# Patient Record
Sex: FEMALE | Race: WHITE | NOT HISPANIC OR LATINO | Employment: OTHER
[De-identification: names, ages, dates, MRNs, and addresses within clinical notes are randomized per-mention and may not be internally consistent; named-entity substitution may affect disease eponyms.]

---

## 2017-12-01 ENCOUNTER — RECORDS - HEALTHEAST (OUTPATIENT)
Dept: ADMINISTRATIVE | Facility: OTHER | Age: 77
End: 2017-12-01

## 2017-12-02 ENCOUNTER — RECORDS - HEALTHEAST (OUTPATIENT)
Dept: ADMINISTRATIVE | Facility: OTHER | Age: 77
End: 2017-12-02

## 2017-12-18 ENCOUNTER — AMBULATORY - HEALTHEAST (OUTPATIENT)
Dept: CARDIAC REHAB | Facility: HOSPITAL | Age: 77
End: 2017-12-18

## 2017-12-18 DIAGNOSIS — Z95.5 STENTED CORONARY ARTERY: ICD-10-CM

## 2017-12-22 ENCOUNTER — AMBULATORY - HEALTHEAST (OUTPATIENT)
Dept: CARDIAC REHAB | Facility: HOSPITAL | Age: 77
End: 2017-12-22

## 2017-12-22 DIAGNOSIS — Z95.5 STENTED CORONARY ARTERY: ICD-10-CM

## 2017-12-22 RX ORDER — ISOSORBIDE MONONITRATE 30 MG/1
30 TABLET, EXTENDED RELEASE ORAL EVERY EVENING
Status: SHIPPED | COMMUNITY
Start: 2017-12-22

## 2017-12-22 RX ORDER — PANTOPRAZOLE SODIUM 40 MG/1
40 TABLET, DELAYED RELEASE ORAL DAILY
Status: SHIPPED | COMMUNITY
Start: 2017-12-22 | End: 2023-11-22

## 2017-12-22 RX ORDER — LEVOTHYROXINE SODIUM 88 UG/1
88 TABLET ORAL EVERY MORNING
Status: SHIPPED | COMMUNITY
Start: 2017-12-22

## 2017-12-22 RX ORDER — NITROGLYCERIN 0.4 MG/1
0.4 TABLET SUBLINGUAL EVERY 5 MIN PRN
Status: SHIPPED | COMMUNITY
Start: 2017-12-22

## 2017-12-22 ASSESSMENT — MIFFLIN-ST. JEOR: SCORE: 1109.99

## 2017-12-29 ENCOUNTER — AMBULATORY - HEALTHEAST (OUTPATIENT)
Dept: CARDIAC REHAB | Facility: HOSPITAL | Age: 77
End: 2017-12-29

## 2017-12-29 DIAGNOSIS — Z95.5 STENTED CORONARY ARTERY: ICD-10-CM

## 2018-01-05 ENCOUNTER — AMBULATORY - HEALTHEAST (OUTPATIENT)
Dept: CARDIAC REHAB | Facility: HOSPITAL | Age: 78
End: 2018-01-05

## 2018-01-05 DIAGNOSIS — Z95.5 STENTED CORONARY ARTERY: ICD-10-CM

## 2018-01-08 ENCOUNTER — AMBULATORY - HEALTHEAST (OUTPATIENT)
Dept: CARDIAC REHAB | Facility: HOSPITAL | Age: 78
End: 2018-01-08

## 2018-01-08 DIAGNOSIS — Z95.5 STENTED CORONARY ARTERY: ICD-10-CM

## 2018-01-10 ENCOUNTER — AMBULATORY - HEALTHEAST (OUTPATIENT)
Dept: CARDIAC REHAB | Facility: HOSPITAL | Age: 78
End: 2018-01-10

## 2018-01-10 DIAGNOSIS — Z95.5 STENTED CORONARY ARTERY: ICD-10-CM

## 2018-01-11 ENCOUNTER — AMBULATORY - HEALTHEAST (OUTPATIENT)
Dept: ADMINISTRATIVE | Facility: REHABILITATION | Age: 78
End: 2018-01-11

## 2018-01-11 DIAGNOSIS — M54.12 CERVICAL RADICULITIS: ICD-10-CM

## 2018-01-11 DIAGNOSIS — M25.511 RIGHT SHOULDER PAIN: ICD-10-CM

## 2018-01-15 ENCOUNTER — RECORDS - HEALTHEAST (OUTPATIENT)
Dept: ADMINISTRATIVE | Facility: OTHER | Age: 78
End: 2018-01-15

## 2018-01-18 ENCOUNTER — OFFICE VISIT - HEALTHEAST (OUTPATIENT)
Dept: PHYSICAL THERAPY | Facility: REHABILITATION | Age: 78
End: 2018-01-18

## 2018-01-18 DIAGNOSIS — M62.81 GENERALIZED MUSCLE WEAKNESS: ICD-10-CM

## 2018-01-18 DIAGNOSIS — M77.8 RIGHT SHOULDER TENDONITIS: ICD-10-CM

## 2018-01-18 DIAGNOSIS — M54.2 BILATERAL POSTERIOR NECK PAIN: ICD-10-CM

## 2018-01-18 DIAGNOSIS — R29.3 POOR POSTURE: ICD-10-CM

## 2018-01-22 ENCOUNTER — AMBULATORY - HEALTHEAST (OUTPATIENT)
Dept: CARDIAC REHAB | Facility: HOSPITAL | Age: 78
End: 2018-01-22

## 2018-01-22 DIAGNOSIS — Z95.5 STENTED CORONARY ARTERY: ICD-10-CM

## 2018-01-24 ENCOUNTER — OFFICE VISIT - HEALTHEAST (OUTPATIENT)
Dept: PHYSICAL THERAPY | Facility: REHABILITATION | Age: 78
End: 2018-01-24

## 2018-01-24 DIAGNOSIS — M77.8 RIGHT SHOULDER TENDONITIS: ICD-10-CM

## 2018-01-24 DIAGNOSIS — R29.3 POOR POSTURE: ICD-10-CM

## 2018-01-24 DIAGNOSIS — M62.81 GENERALIZED MUSCLE WEAKNESS: ICD-10-CM

## 2018-01-24 DIAGNOSIS — M54.2 BILATERAL POSTERIOR NECK PAIN: ICD-10-CM

## 2018-01-26 ENCOUNTER — AMBULATORY - HEALTHEAST (OUTPATIENT)
Dept: CARDIAC REHAB | Facility: HOSPITAL | Age: 78
End: 2018-01-26

## 2018-01-26 DIAGNOSIS — Z95.5 STENTED CORONARY ARTERY: ICD-10-CM

## 2018-01-29 ENCOUNTER — AMBULATORY - HEALTHEAST (OUTPATIENT)
Dept: CARDIAC REHAB | Facility: HOSPITAL | Age: 78
End: 2018-01-29

## 2018-01-29 DIAGNOSIS — Z95.5 STENTED CORONARY ARTERY: ICD-10-CM

## 2018-02-02 ENCOUNTER — AMBULATORY - HEALTHEAST (OUTPATIENT)
Dept: CARDIAC REHAB | Facility: HOSPITAL | Age: 78
End: 2018-02-02

## 2018-02-02 DIAGNOSIS — Z95.5 STENTED CORONARY ARTERY: ICD-10-CM

## 2018-02-05 ENCOUNTER — AMBULATORY - HEALTHEAST (OUTPATIENT)
Dept: CARDIAC REHAB | Facility: HOSPITAL | Age: 78
End: 2018-02-05

## 2018-02-05 DIAGNOSIS — Z95.5 STENTED CORONARY ARTERY: ICD-10-CM

## 2018-02-07 ENCOUNTER — OFFICE VISIT - HEALTHEAST (OUTPATIENT)
Dept: PHYSICAL THERAPY | Facility: REHABILITATION | Age: 78
End: 2018-02-07

## 2018-02-07 DIAGNOSIS — M77.8 RIGHT SHOULDER TENDONITIS: ICD-10-CM

## 2018-02-07 DIAGNOSIS — R29.3 POOR POSTURE: ICD-10-CM

## 2018-02-07 DIAGNOSIS — M54.2 BILATERAL POSTERIOR NECK PAIN: ICD-10-CM

## 2018-02-07 DIAGNOSIS — M62.81 GENERALIZED MUSCLE WEAKNESS: ICD-10-CM

## 2018-02-12 ENCOUNTER — AMBULATORY - HEALTHEAST (OUTPATIENT)
Dept: CARDIAC REHAB | Facility: HOSPITAL | Age: 78
End: 2018-02-12

## 2018-02-12 DIAGNOSIS — Z95.5 STENTED CORONARY ARTERY: ICD-10-CM

## 2018-02-16 ENCOUNTER — AMBULATORY - HEALTHEAST (OUTPATIENT)
Dept: CARDIAC REHAB | Facility: HOSPITAL | Age: 78
End: 2018-02-16

## 2018-02-16 DIAGNOSIS — Z95.5 STENTED CORONARY ARTERY: ICD-10-CM

## 2018-02-19 ENCOUNTER — OFFICE VISIT - HEALTHEAST (OUTPATIENT)
Dept: PHYSICAL THERAPY | Facility: REHABILITATION | Age: 78
End: 2018-02-19

## 2018-02-19 ENCOUNTER — AMBULATORY - HEALTHEAST (OUTPATIENT)
Dept: CARDIAC REHAB | Facility: HOSPITAL | Age: 78
End: 2018-02-19

## 2018-02-19 DIAGNOSIS — M54.2 BILATERAL POSTERIOR NECK PAIN: ICD-10-CM

## 2018-02-19 DIAGNOSIS — R29.3 POOR POSTURE: ICD-10-CM

## 2018-02-19 DIAGNOSIS — Z95.5 STENTED CORONARY ARTERY: ICD-10-CM

## 2018-02-19 DIAGNOSIS — M62.81 GENERALIZED MUSCLE WEAKNESS: ICD-10-CM

## 2018-02-19 DIAGNOSIS — M77.8 RIGHT SHOULDER TENDONITIS: ICD-10-CM

## 2018-02-23 ENCOUNTER — AMBULATORY - HEALTHEAST (OUTPATIENT)
Dept: CARDIAC REHAB | Facility: HOSPITAL | Age: 78
End: 2018-02-23

## 2018-02-23 DIAGNOSIS — Z95.5 STENTED CORONARY ARTERY: ICD-10-CM

## 2018-02-26 ENCOUNTER — OFFICE VISIT - HEALTHEAST (OUTPATIENT)
Dept: PHYSICAL THERAPY | Facility: REHABILITATION | Age: 78
End: 2018-02-26

## 2018-02-26 ENCOUNTER — AMBULATORY - HEALTHEAST (OUTPATIENT)
Dept: CARDIAC REHAB | Facility: HOSPITAL | Age: 78
End: 2018-02-26

## 2018-02-26 DIAGNOSIS — R29.3 POOR POSTURE: ICD-10-CM

## 2018-02-26 DIAGNOSIS — Z95.5 STENTED CORONARY ARTERY: ICD-10-CM

## 2018-02-26 DIAGNOSIS — M77.8 RIGHT SHOULDER TENDONITIS: ICD-10-CM

## 2018-02-26 DIAGNOSIS — M62.81 GENERALIZED MUSCLE WEAKNESS: ICD-10-CM

## 2018-02-26 DIAGNOSIS — M54.2 BILATERAL POSTERIOR NECK PAIN: ICD-10-CM

## 2018-03-02 ENCOUNTER — AMBULATORY - HEALTHEAST (OUTPATIENT)
Dept: CARDIAC REHAB | Facility: HOSPITAL | Age: 78
End: 2018-03-02

## 2018-03-02 DIAGNOSIS — Z95.5 STENTED CORONARY ARTERY: ICD-10-CM

## 2018-03-05 ENCOUNTER — OFFICE VISIT - HEALTHEAST (OUTPATIENT)
Dept: PHYSICAL THERAPY | Facility: REHABILITATION | Age: 78
End: 2018-03-05

## 2018-03-05 DIAGNOSIS — R29.3 POOR POSTURE: ICD-10-CM

## 2018-03-05 DIAGNOSIS — M54.2 BILATERAL POSTERIOR NECK PAIN: ICD-10-CM

## 2018-03-05 DIAGNOSIS — M77.8 RIGHT SHOULDER TENDONITIS: ICD-10-CM

## 2018-03-05 DIAGNOSIS — M62.81 GENERALIZED MUSCLE WEAKNESS: ICD-10-CM

## 2018-03-12 ENCOUNTER — OFFICE VISIT - HEALTHEAST (OUTPATIENT)
Dept: PHYSICAL THERAPY | Facility: REHABILITATION | Age: 78
End: 2018-03-12

## 2018-03-12 DIAGNOSIS — M77.8 RIGHT SHOULDER TENDONITIS: ICD-10-CM

## 2018-03-12 DIAGNOSIS — R29.3 POOR POSTURE: ICD-10-CM

## 2018-03-12 DIAGNOSIS — M62.81 GENERALIZED MUSCLE WEAKNESS: ICD-10-CM

## 2018-03-12 DIAGNOSIS — M54.2 BILATERAL POSTERIOR NECK PAIN: ICD-10-CM

## 2018-03-19 ENCOUNTER — OFFICE VISIT - HEALTHEAST (OUTPATIENT)
Dept: PHYSICAL THERAPY | Facility: REHABILITATION | Age: 78
End: 2018-03-19

## 2018-03-19 DIAGNOSIS — R29.3 POOR POSTURE: ICD-10-CM

## 2018-03-19 DIAGNOSIS — M62.81 GENERALIZED MUSCLE WEAKNESS: ICD-10-CM

## 2018-03-19 DIAGNOSIS — M77.8 RIGHT SHOULDER TENDONITIS: ICD-10-CM

## 2018-03-19 DIAGNOSIS — M54.2 BILATERAL POSTERIOR NECK PAIN: ICD-10-CM

## 2018-03-26 ENCOUNTER — OFFICE VISIT - HEALTHEAST (OUTPATIENT)
Dept: PHYSICAL THERAPY | Facility: REHABILITATION | Age: 78
End: 2018-03-26

## 2018-03-26 DIAGNOSIS — M77.8 RIGHT SHOULDER TENDONITIS: ICD-10-CM

## 2018-03-26 DIAGNOSIS — R29.3 POOR POSTURE: ICD-10-CM

## 2018-03-26 DIAGNOSIS — M62.81 GENERALIZED MUSCLE WEAKNESS: ICD-10-CM

## 2018-03-26 DIAGNOSIS — M54.2 BILATERAL POSTERIOR NECK PAIN: ICD-10-CM

## 2018-04-03 ENCOUNTER — OFFICE VISIT - HEALTHEAST (OUTPATIENT)
Dept: PHYSICAL THERAPY | Facility: REHABILITATION | Age: 78
End: 2018-04-03

## 2018-04-03 DIAGNOSIS — M62.81 GENERALIZED MUSCLE WEAKNESS: ICD-10-CM

## 2018-04-03 DIAGNOSIS — M77.8 RIGHT SHOULDER TENDONITIS: ICD-10-CM

## 2018-04-03 DIAGNOSIS — R29.3 POOR POSTURE: ICD-10-CM

## 2018-04-03 DIAGNOSIS — M54.2 BILATERAL POSTERIOR NECK PAIN: ICD-10-CM

## 2018-04-10 ENCOUNTER — OFFICE VISIT - HEALTHEAST (OUTPATIENT)
Dept: PHYSICAL THERAPY | Facility: REHABILITATION | Age: 78
End: 2018-04-10

## 2018-04-10 DIAGNOSIS — M54.2 BILATERAL POSTERIOR NECK PAIN: ICD-10-CM

## 2018-04-10 DIAGNOSIS — R29.3 POOR POSTURE: ICD-10-CM

## 2018-04-10 DIAGNOSIS — M62.81 GENERALIZED MUSCLE WEAKNESS: ICD-10-CM

## 2018-04-10 DIAGNOSIS — M77.8 RIGHT SHOULDER TENDONITIS: ICD-10-CM

## 2018-05-08 ENCOUNTER — OFFICE VISIT - HEALTHEAST (OUTPATIENT)
Dept: PHYSICAL THERAPY | Facility: REHABILITATION | Age: 78
End: 2018-05-08

## 2018-05-08 DIAGNOSIS — M54.2 BILATERAL POSTERIOR NECK PAIN: ICD-10-CM

## 2018-05-08 DIAGNOSIS — M77.8 RIGHT SHOULDER TENDONITIS: ICD-10-CM

## 2018-05-08 DIAGNOSIS — R29.3 POOR POSTURE: ICD-10-CM

## 2018-05-08 DIAGNOSIS — M62.81 GENERALIZED MUSCLE WEAKNESS: ICD-10-CM

## 2020-06-01 ENCOUNTER — ANESTHESIA - HEALTHEAST (OUTPATIENT)
Dept: SURGERY | Facility: HOSPITAL | Age: 80
End: 2020-06-01

## 2020-06-01 ASSESSMENT — MIFFLIN-ST. JEOR: SCORE: 1100.92

## 2020-06-02 ENCOUNTER — HOME CARE/HOSPICE - HEALTHEAST (OUTPATIENT)
Dept: HOME HEALTH SERVICES | Facility: HOME HEALTH | Age: 80
End: 2020-06-02

## 2020-06-02 ENCOUNTER — SURGERY - HEALTHEAST (OUTPATIENT)
Dept: SURGERY | Facility: HOSPITAL | Age: 80
End: 2020-06-02

## 2020-06-03 ENCOUNTER — AMBULATORY - HEALTHEAST (OUTPATIENT)
Dept: OTHER | Facility: CLINIC | Age: 80
End: 2020-06-03

## 2021-02-03 ENCOUNTER — COMMUNICATION - HEALTHEAST (OUTPATIENT)
Dept: PHYSICAL THERAPY | Facility: REHABILITATION | Age: 81
End: 2021-02-03

## 2021-02-15 ENCOUNTER — AMBULATORY - HEALTHEAST (OUTPATIENT)
Dept: NURSING | Facility: CLINIC | Age: 81
End: 2021-02-15

## 2021-03-08 ENCOUNTER — AMBULATORY - HEALTHEAST (OUTPATIENT)
Dept: NURSING | Facility: CLINIC | Age: 81
End: 2021-03-08

## 2021-05-31 VITALS — BODY MASS INDEX: 23.63 KG/M2 | WEIGHT: 142 LBS

## 2021-05-31 VITALS — WEIGHT: 141 LBS | BODY MASS INDEX: 23.46 KG/M2

## 2021-05-31 VITALS — HEIGHT: 65 IN | WEIGHT: 142 LBS | BODY MASS INDEX: 23.66 KG/M2

## 2021-05-31 VITALS — BODY MASS INDEX: 23.96 KG/M2 | WEIGHT: 144 LBS

## 2021-05-31 VITALS — BODY MASS INDEX: 23.46 KG/M2 | WEIGHT: 141 LBS

## 2021-05-31 VITALS — BODY MASS INDEX: 23.26 KG/M2 | WEIGHT: 139.8 LBS

## 2021-05-31 VITALS — BODY MASS INDEX: 23.3 KG/M2 | WEIGHT: 140 LBS

## 2021-05-31 VITALS — WEIGHT: 140.5 LBS | BODY MASS INDEX: 23.38 KG/M2

## 2021-05-31 VITALS — BODY MASS INDEX: 23.8 KG/M2 | WEIGHT: 143 LBS

## 2021-06-04 VITALS — WEIGHT: 144.8 LBS | BODY MASS INDEX: 24.12 KG/M2 | HEIGHT: 65 IN

## 2021-06-06 ENCOUNTER — HEALTH MAINTENANCE LETTER (OUTPATIENT)
Age: 81
End: 2021-06-06

## 2021-06-08 NOTE — ANESTHESIA CARE TRANSFER NOTE
Last vitals:   Vitals:    06/02/20 0734   BP:    Pulse: 78   Resp:    Temp:    SpO2: 93%     Patient's level of consciousness is drowsy  Spontaneous respirations: yes  Maintains airway independently: yes  Dentition unchanged: yes  Oropharynx: oropharynx clear of all foreign objects    QCDR Measures:  ASA# 20 - Surgical Safety Checklist: WHO surgical safety checklist completed prior to induction    PQRS# 430 - Adult PONV Prevention: 4558F - Pt received => 2 anti-emetic agents (different classes) preop & intraop  ASA# 8 - Peds PONV Prevention: NA - Not pediatric patient, not GA or 2 or more risk factors NOT present  PQRS# 424 - Christy-op Temp Management: 4559F - At least one body temp DOCUMENTED => 35.5C or 95.9F within required timeframe  PQRS# 426 - PACU Transfer Protocol: - Transfer of care checklist used  ASA# 14 - Acute Post-op Pain: ASA14B - Patient did NOT experience pain >= 7 out of 10

## 2021-06-08 NOTE — ANESTHESIA POSTPROCEDURE EVALUATION
Patient: Jessica Cruz  Procedure(s):  HEMIARTHROPLASTY, HIP, BIPOLAR LEFT (Left)  Anesthesia type: spinal    Patient location: PACU  Last vitals:   Vitals Value Taken Time   /80 6/2/2020 11:50 AM   Temp 37.6  C (99.6  F) 6/2/2020 10:15 AM   Pulse 81 6/2/2020 11:55 AM   Resp 20 6/2/2020 11:55 AM   SpO2 97 % 6/2/2020 11:55 AM   Vitals shown include unvalidated device data.  Post vital signs: stable  Level of consciousness: alert and conversant  Post-anesthesia pain: pain controlled  Post-anesthesia nausea and vomiting: no  Pulmonary: supplemental oxygen  Cardiovascular: stable and blood pressure at baseline  Hydration: adequate  Anesthetic events: no    QCDR Measures:  ASA# 11 - Christy-op Cardiac Arrest: ASA11B - Patient did NOT experience unanticipated cardiac arrest  ASA# 12 - Christy-op Mortality Rate: ASA12B - Patient did NOT die  ASA# 13 - PACU Re-Intubation Rate: ASA13B - Patient did NOT require a new airway mgmt  ASA# 10 - Composite Anes Safety: ASA10A - No serious adverse event    Additional Notes:

## 2021-06-08 NOTE — PROGRESS NOTES
Patient was seen at [facility] room 229 for the F/D of a [place] PLS AFO, left, size medium. Dx: left foot drop. AFO was adjusted for proper fit. I was able to don AFO with shoe provided.  Patient was tired after fitting and wishes to try the AFO with PT tomorrow.    Please contact [facility] with any question or fitting issues.

## 2021-06-08 NOTE — ANESTHESIA PROCEDURE NOTES
Spinal Block    Patient location during procedure: OR  Start time: 6/2/2020 8:25 AM  End time: 6/2/2020 8:40 AM  Reason for block: primary anesthetic    Staffing:  Performing  Anesthesiologist: Leoncio Guzman MD    Preanesthetic Checklist  Completed: patient identified, risks, benefits, and alternatives discussed, timeout performed, consent obtained, airway assessed, oxygen available, suction available, emergency drugs available and hand hygiene performed  Spinal Block  Patient position: right lateral decubitus  Prep: ChloraPrep  Patient monitoring: heart rate, cardiac monitor, continuous pulse ox and blood pressure  Approach: midline  Interspace: multiple tries at multiple levels, midline and paramwedian.  Needle type: Spinocan.   Needle gauge: 22 G      Additional Notes:  Got good pops with multiple passes, but never obtained CSF.  Elected GETA.

## 2021-06-08 NOTE — ANESTHESIA PREPROCEDURE EVALUATION
"Anesthesia Evaluation      Patient summary reviewed   History of anesthetic complications     Airway   Mallampati: II   Pulmonary - normal exam   (+) a smoker (Former)                         Cardiovascular - normal exam  Exercise tolerance: > or = 4 METS  (+) hypertension, valvular problems/murmurs (Moderate AR) AI, past MI, CAD, CABG/stent (Stents x 3), , hypercholesterolemia,     (-) angina  ECG reviewed        Neuro/Psych    (+) neuromuscular disease,      Endo/Other    (+) hypothyroidism, arthritis,      Comments: Left hip fracture    GI/Hepatic/Renal    (+) GERD well controlled,       Comments: H/o colon Ca     Other findings: L cervical radiculopathy, has sciatica as well. PONV.  Coronary stents x 3.  Diverticular disease, gastritis, colon Ca.    Pt has supplemental oxygen on, says O2 was \"low\" last night.      4/3/19 Echo:  The left ventricle is normal size,  left ventricular systolic function is normal, estimated LVEF 55-60%.  Left ventricular wall motion is grossly normal however, endocardial definition is limited.  There is moderate aortic regurgitation.  Normal right ventricular systolic function.  No pulmonary hypertension, estimated right ventricular systolic pressure is  20 mmHg.  Compared to prior study dated 10/03/2018 TTE there is no significant  change in the LVEF. The AI is moderate in both studies.    COVID PCR negative 6/1/20      Dental    (+) upper dentures                       Anesthesia Plan  Planned anesthetic: spinal    ASA 3     Anesthetic plan and risks discussed with: patient  Anesthesia plan special considerations: antiemetics,   Post-op plan: routine recovery          "

## 2021-06-14 NOTE — PROGRESS NOTES
ITP ASSESSMENT   Assessment Day: Initial  Session Number: 1  Precautions: Standard  Diagnosis: Stent  Risk Stratification: Medium  Referring Provider: Consuelo Yates MD  EXERCISE  Exercise Assessment: Initial     6 Minute Walk Test   Pre   Pre Exercise HR: 66                  Pre Exercise BP: 117/64    Peak  Peak HR: 83                 Peak BP: 143/60  Peak feet: 1200  Peak O2 SAT: 99  Peak RPE: 13  Peak MPH: 2.27    Symptoms:  Peak Symptoms: Minimal SOB    5 mins. Post  5 Min Post HR: 59  5 Min Post BP: 132/53                         Exercise Plan  Goals Next 30 days  ADL'S: COok meals and do laundry without SOB  Leisure: Walk at the [facility] 3-5 x week  Work: Resume volunteering at [employer]    Education Goals: Patient can state cardiac s/s and appropriate emergency response.;Has system for taking medication.;Medication review  Education Goals Met: Has system for taking medication.    Exercise Prescription  Exercise Mode: Treadmill;Bike;Nustep;Arm Erg.  Frequency: 2 x week  Duration: 30-40'  Intensity / THR: 20-30 beats above resting heart rate  RPE 11-14  Progression / Met level: 2.6-3  Resistive Training?: No (Will complete in the future)    Current Exercise (mins/week): 1    Interventions  Home Exercise:  Mode: Walk  Frequency: 3-5 x week  Duration: 10-20'    Education Material : Educational videos;Provide written material;Individual education and counseling;Offer educational classes    Education Completed  Exercise Education Completed: Cardiac Anatomy;Signs and Symptoms;RPE;Emergency Plan;Home Exercise;Warm up/cool down;FITT Principles;BP/HR Reponse to exercise;Benefits of Exercise;End point of exercise            Exercise Follow-up/Discharge  Follow up/Discharge: Encouraged indoor walking;  3- 5 x week NUTRITION  Nutrition Assessment: Initial    Nutrition Risk Factors:  Nutrition Risk Factors: Dyslipidemia  Cholesterol: 207  LDL: 67  HDL: 120  Triglycerides: 98    Nutrition  "Plan  Interventions  Nutrition Interventions: Diet consult;Diet class;Therapist/Patient discussion;Educational videos;Provide with written material    Education Completed  Nutrition Education Completed: Risk factor overview    Goals  Nutrition Goals (Next 30 days): Review Dietitian schedule;Provide Rate your Plate Survey    Goals Met  Nutrition Goals Met: Reviewed Dietitian schedule;Provided Rate your Plate Survey    Height, Weight, and  BMI  Weight: 142 lb (64.4 kg)  Height: 5' 5\" (1.651 m)  BMI: 23.63    Nutrition Follow-up  Follow-up/Discharge: Encouraged pt to complete diet habit survey       Other Risk Factors  Other Risk Factor Assessment: Initial    HTN Risk Factor: NA    Pre Exercise BP: 117/64  Post Exercise BP: 132/53        Tobacco Risk Factor: NA            Risk Factor Follow-up   Follow-up/Discharge: Provide risk factor education as needed   PSYCHOSOCIAL  Psychosocial Assessment: Initial     [facility] ISSA Q of L Summary Score: 29    QING-D Score: 12    Psychosocial Risk Factor: Stress    Psychosocial Plan  Interventions    Interventions: Offer Social Service consult;Offer Spiritual Care consult;Offer educational videos and classes;Provide written material;Individual education and counseling    Education Completed  Education Completed: Effects of stress on body    Goals  Goals (Next 30 days): Practicing stress management skills    Goals Met  Goals Met: Identified Support system;Oriented to stress management classes;Identify stressors    Psychosocial Follow-up  Follow-up/Discharge: Issued pt Stress/ Relaxation booklet;  Reports she has a great deal of stress           Patient involved in Goal setting?: Yes    Signature: _____________________________________________________________    Date: __________________    Time: __________________  "

## 2021-06-14 NOTE — PROGRESS NOTES
"Cardiac Rehab  Phase II Assessment    Assessment Date: 12/22/17    Diagnosis: PCI/stent  Date of Onset: 12/1/17  Procedure: PCI/Stent  RCA  Date of Onset: 121/1/17  ICD/Pacemaker: No   Post-op Complications: Re-Admit to [facility] 12/9-12/10/17 with CP.  MI ruled out.  Stared on Imdur  ECG History: SB/SR/BBB EF%:50%  Past Medical History:Unstable Angina; Aortic Regurg; Dyslipidemia; Hypothyroidism; Corneal abrasion; Cellulitis; Colonic Polyps                                     Arhtritis; Bleeding Disorder; Colon CA; CAD; Dry EYe; Insomnia;    Physical Assessment  Precautions/ Physical Limitations: Standard  Oxygen: No  O2 Sats: 97-99% Lung Sounds: Clear Edema:   Incisions:   Sleeping Pattern: poor  Appetite: fair   Nutrition Risk Screen: Weight loss    Pain  Location: Shoulder and Low back pain  Characteristics:Achey  Intensity: (0-10 scale) 2  Current Pain Management: Tylenol  Intervention:   Response:     Psychosocial/ Emotional Health  1. In the past 12 months, have you been in a relationship where you have been abused physically, emotionally, sexually or financially? No  notified: NA  2. Who do you turn to for emotional support?:Friend and Sister-in-law  3. Do you have cultural or spiritual needs? No  4. Have there been any major life changes in the past 12 months? Yes ;Family stress;  Grand dtr very ill;  Pt used to take care of 3 days week;  Unable to now;Caused stress in family    Referral Information  Primary Physician: Consuelo Yates MD  Cardiologist: Dr. Peyman Krishnamurthy  Surgeon:     Home exercise/Equipment: None;  Belongs to [facility]    Patient's long-term goal(s): Resume all previous tasks    1. Living Accommodations: [facility] Steps: No      Support people at home:    2. Marital Status:   3. Family is able to assist with cares      Temple/Community involvement: Yes  4. Recreation/Hobbies: Belongs to 3 \"[employer]\"  Travel;  Local tours         See Doc " Flowsheet

## 2021-06-15 NOTE — PROGRESS NOTES
"[facility] Daily Progress     Patient Name: Jessica Cruz  Date: 1/24/2018  Visit #:2/8 - [facility]  PTA visit #:    Referral Diagnosis: Neck pain, shoulder pain   Referring provider: Consuelo Yates MD  Visit Diagnosis:     ICD-10-CM    1. Bilateral posterior neck pain M54.2    2. Right shoulder tendonitis M75.81    3. Generalized muscle weakness M62.81    4. Poor posture R29.3          Assessment:   Patient returns to PT with increased neck pain and L UE radiation. She wishes to pursue a cortiosteroid injection, which likely is appropriate given she was unable to tolerate gentle exercise today and her Sx are worsening. She has been non compliant with HEP thus far. Gave patient information for [facility] spine center and suggested she contact PCP regarding worsening of cervical symptoms.     Patient is benefitting from skilled physical therapy and is making steady progress toward functional goals.  Patient is appropriate to continue with skilled physical therapy intervention, as indicated by initial plan of care.    Goal Status:  Pt. will be independent with home exercise program in : 2 weeks  Pt will: return to yoga 1x/week for 60' without increased shoulder or neck pain to improve exercise in 8 weeks  Pt will: sleep without waking due to neck pain >4/7 nights/week to improve sleep quality in 8 weeks  Pt will: reduce NDI by >10% for significant change in 8 weeks  Pt will: sit >45 minutes to drive or read without increased neck pain to improve ADL's and safety in 8 weeks    Plan / Patient Education:     Continue with initial plan of care.  Discussed pt can contact PCP regarding potential corticosteroid injection.     Subjective:   Pt returns to PT stating her pain has worsened. She c/o intense 8/10 neck pain with L UE radiation. She has not been performing HEP due to her pain. \"It's a crazy bone pain in my left arm\". Pt also c/o feeling her arm is \"hot\".       Objective:     Decreased pain 1/10 during manual " "cervical traction     Treatment Today     TREATMENT MINUTES COMMENTS   Evaluation     Self-care/ Home management     Manual therapy 40 Discussion of progress - pt requests to receive a cortiosteroid injection in cervical spine.   Educated pt on getting order from PCP and potential referral to HE spine center.     Grade III cervical traction x 30 second holds    SO release with gentle traction x 20 seconds holds    STM to B SCM and scalenes with gentle SB setretch    Neuromuscular Re-education     Therapeutic Activity     Therapeutic Exercises 15 Exercises:  Exercise #1: Chin tuck and chin tuck with head pressure  Comment #1: not today- pt c/o increased neck pain and L UE radiation   Exercise #2: Pec stretch  Comment #2: not today- pt c/o increased B shoulder pain. Attempted with arms straight and \"w\" rather than goal post arms, but pt was still unable to tolerate.   Exercise #3: Shoulder extension with L2 band x 3 seconds x 10 reps  Comment #3: HEP - pt tolerated 10 reps, but c/o increased L shoulder pain after 10 reps     Gait training     Modality__________________                Total 55    Blank areas are intentional and mean the treatment did not include these items.       Stacie Mena  1/24/2018    "

## 2021-06-15 NOTE — PROGRESS NOTES
"[facility] Daily Progress     Patient Name: Jessica Cruz  Date: 2/7/2018  Visit #:3/8 - [facility]  PTA visit #:    Referral Diagnosis: Neck pain, shoulder pain   Referring provider: Consuelo Yates MD  Visit Diagnosis:     ICD-10-CM    1. Bilateral posterior neck pain M54.2    2. Right shoulder tendonitis M75.81    3. Generalized muscle weakness M62.81    4. Poor posture R29.3          Assessment:   Patient returns to PT reporting significant improvement in neck pain and L UE radiation. She states her neck is \"sore\" but no longer has c/o UE pain. Pt appears to have responded well to manual interventions at last visit. She does however c/o increased R shoulder pain. HEP was modified again today to include shoulder and scapular strengthening, although pt was unable to tolerate all exercises due to pain. She is appropriate for continued PT 1x/week.     Patient is benefitting from skilled physical therapy and is making steady progress toward functional goals.  Patient is appropriate to continue with skilled physical therapy intervention, as indicated by initial plan of care.    Goal Status:  Pt. will be independent with home exercise program in : 2 weeks  Pt will: return to yoga 1x/week for 60' without increased shoulder or neck pain to improve exercise in 8 weeks  Pt will: sleep without waking due to neck pain >4/7 nights/week to improve sleep quality in 8 weeks  Pt will: reduce NDI by >10% for significant change in 8 weeks  Pt will: sit >45 minutes to drive or read without increased neck pain to improve ADL's and safety in 8 weeks    Plan / Patient Education:     Continue with initial plan of care.  Cont PT1x/week.     Subjective:   Pt reports she had an MRI taken- per her report, it showed stenosis.   Pt states she will be having an EMG for both arms. She is no longer interested in cervical corticosteroid injection, but would like a R shoulder injection.   Pt states her neck is occasionally sore, but the " "\"excruciating pain\" has gone away. She felt significant relief from her last treatment and is happy with how she feels today with regards to neck pain.    Objective:     Decreased pain 1/10 during manual cervical traction     Treatment Today     TREATMENT MINUTES COMMENTS   Evaluation     Self-care/ Home management     Manual therapy 25   Grade III cervical traction x 30 second holds    SO release with gentle traction x 20 seconds holds    Grade II posterior and inferior glide R GH joint with sustained hold and gentle oscillations.      Neuromuscular Re-education     Therapeutic Activity     Therapeutic Exercises 30 HEP modified as follows:     Exercise #1: Chin tuck   Comment #1: HEP x 10 seconds x 15-20 reps   Exercise #2: Reach and roll  Comment #2: HEP x 5 reps, R UE only; pt does not tolerate lying on right shoulder today  Exercise #3: Standing row with L1 band- pt unable to perform standing shoulder extension   Comment #3: HEP x 3 seconds x 10-15 reps  Exercise #4: Standing ER L1 band   Comment #4: HEP x 15-20 reps     Pt is unable to tolerate standing IR due to pain. Unable to perform reach and roll in full ROM, so modified.   Modified standing extension to standing rows with L1 band due to c/o R shoulder pain.     Gait training     Modality__________________                Total 55    Blank areas are intentional and mean the treatment did not include these items.       Stacie Mena  2/7/2018  "

## 2021-06-15 NOTE — PROGRESS NOTES
ITP ASSESSMENT   Assessment Day: 30 Day  Session Number: 5  Precautions: Standard  Diagnosis: Stent  Risk Stratification: Medium  Referring Provider: Dr. Jose Gaming  EXERCISE  Exercise Assessment: Reassessment     Tolerating 40' of exercise at 3 METs                         Exercise Plan  Goals Next 30 days  ADL'S: Walk 2-3x/week at [facility]  Leisure: Volunteer at [employer]  Work: Drive independently to visit grandchildren    Education Goals: All goals in this section met  Education Goals Met: Patient can state cardiac s/s and appropriate emergency response.;Has system for taking medication.;Medication review.                        Goals Met  Initial ADL's goals met: Pt is cooking meals - goal met  Initial Leisure goals met: Pt is tolerating laundry without SOB - goal met  Intial Work goals met: Pt has not yet resumed volunteering at [employer], but has resumed [place] - goal in progress  Initial Progression: Pt states an improvement in tolerance for activities    Exercise Prescription  Exercise Mode: Treadmill;Bike;Nustep;Arm Erg.  Frequency: 2x/week  Duration: 40'  Intensity / THR: 20-30 beats above resting heart rate  RPE 11-14  Progression / Met level: 3-3.5  Resistive Training?: Yes    Current Exercise (mins/week): 100    Interventions  Home Exercise:  Mode: Walk  Frequency: 3-5x/week  Duration: 20-30'    Education Material : Educational videos;Provide written material;Individual education and counseling;Offer educational classes    Education Completed  Exercise Education Completed: Cardiac Anatomy;Signs and Symptoms;Medication review;RPE;Emergency Plan;Home Exercise;Warm up/cool down;FITT Principles;BP/HR Reponse to exercise;Benefits of Exercise;End point of exercise            Exercise Follow-up/Discharge  Follow up/Discharge: Pt has returned to Yoga at the [facility]. Encouraged regular aerobic exercise in addition to yoga classes NUTRITION  Nutrition Assessment: Reassessment    Nutrition Risk  "Factors:  Nutrition Risk Factors: Dyslipidemia  Cholesterol: 207  LDL: 67  HDL: 120  Triglycerides: 98    Nutrition Plan  Interventions  Nutrition Interventions: Diet consult;Provide with written material  Rate Your Plate Score: 50    Education Completed  Nutrition Education Completed: Low Saturated fat diet    Goals  Nutrition Goals (Next 30 days): Patient will follow a low saturated fat diet    Goals Met  Nutrition Goals Met: Patient states following a low saturated fat diet    Height, Weight, and  BMI  Weight: 141 lb (64 kg)  Height: 5' 5\" (1.651 m)  BMI: 23.46    Nutrition Follow-up  Follow-up/Discharge: Patient stated that she needs to work on eating 3 meals per day.  She stated she frequently skips lunch because she is busy sewing.       Other Risk Factors  Other Risk Factor Assessment: Reassessment    HTN Risk Factor: NA    Pre Exercise BP: 118/54  Post Exercise BP: 114/66    Tobacco Risk Factor: NA    Risk Factor Follow-up   Follow-up/Discharge: Reviewed all risk factors and plan for risk mgmt       PSYCHOSOCIAL  Psychosocial Assessment: Reassessment     [facility] L Summary Score: 29    QING-D Score: 12    Psychosocial Risk Factor: Stress    Psychosocial Plan  Interventions  Interventions: Offer Spiritual Care consult;Offer educational videos and classes;Provide written material;Individual education and counseling    Education Completed  Education Completed: Relaxation/Coping Techniques;Effects of stress on body    Goals  Goals (Next 30 days): Practicing stress management skills    Goals Met  Goals Met: Identified Support system;Oriented to stress management classes;Identify stressors    Psychosocial Follow-up  Follow-up/Discharge: Reviewed effects of stress on the heart. Encouraged pt to attend stress mgmt class     Patient involved in Goal setting?: Yes    Signature: _____________________________________________________________    Date: __________________    Time: __________________  "

## 2021-06-15 NOTE — PROGRESS NOTES
Jessica Cruz has participated in 5 sessions of Phase II Cardiac Rehab.    Progress Report:   Cardiac Rehab Treatment Progress Report 12/22/2017 12/29/2017 1/5/2018 1/8/2018 1/10/2018   Weight 142 lbs 143 lbs 141 lbs 144 lbs 141 lbs   Pre Exercise  HR 66 73 67 64 68   Pre Exercise /64 102/54 124/62 128/78 118/54   Treadmill Peak HR - 88 81 80 78   Nustep Peak Heart Rate - 82 77 75 74   Nustep Peak Blood Pressure - 152/68 126/64 148/64 128/52   Heart Rate 59 69 69 66 64   Post Exercise /53 102/52 132/60 122/54 114/66   ECG NSR/BBB SR SR SR SR   Total Exercise Minutes 6 35 40 40 40         Current Status:  Currently exercising without complaints or symptoms.    If Physician recommends change in treatment plan, please place orders.        __________________________________________________      _____________  Signature                                                                                                  Date

## 2021-06-16 NOTE — PROGRESS NOTES
ITP ASSESSMENT   Assessment Day: 90 Day - Discharge Note  Session Number: 16  Precautions: Standard  Diagnosis: Stent  Risk Stratification: Medium  Referring Provider: Consuelo Yates MD  EXERCISE  Exercise Assessment: Reassessment     6 Minute Walk Test   Pre   Pre Exercise HR: 72                  Pre Exercise BP: 120/60    Peak  Peak HR: 96                 Peak BP: 160/70  Peak feet: 1450  Peak O2 SAT: 99  Peak RPE: 12  Peak MPH: 2.75    Symptoms:  Peak Symptoms: None    5 mins. Post  5 Min Post HR: 76  5 Min Post BP: 114/64                         Exercise Plan  Education Goals Met: Patient can state cardiac s/s and appropriate emergency response.;Has system for taking medication.;Medication review.                        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 [employer]  30 day Work goals met: Has not driven to see Grandchildren   30 Day Progression: Making progress;  Limited by shoulder discomfort    Initial ADL's goals met: Pt is cooking meals - goal met  Initial Leisure goals met: Pt is tolerating laundry without SOB - goal met  Intial Work goals met: Pt has not yet resumed volunteering at [employer], but has resumed [employer] - goal in progress  Initial Progression: Pt states an improvement in tolerance for activities    Current Exercise (mins/week): 200    Interventions  Home Exercise:  Mode: Walk  Frequency: 4-7 days/week  Duration: 40'    Education Material : Educational videos;Provide written material;Individual education and counseling;Offer educational classes    Education Completed  Exercise Education Completed: Cardiac  "Anatomy;Signs and Symptoms;Medication review;RPE;Emergency Plan;Home Exercise;Warm up/cool down;FITT Principles;BP/HR Reponse to exercise;Benefits of Exercise;End point of exercise            Exercise Follow-up/Discharge  Follow up/Discharge: Reviewed and issued home program NUTRITION  Nutrition Assessment: Reassessment    Nutrition Risk Factors:  Nutrition Risk Factors: Dyslipidemia    Nutrition Plan  Interventions  Diet Consult: Completed  Other Nutrition Intervention: Diet Class;Therapist/Pt Discussion;Educational Videos;Provide with Written Material  Initial Rate Your Plate Score: 50    Education Completed  Nutrition Education Completed: Low Saturated fat diet;Risk factor overview;Low sodium diet;Weight management    Goals Met  Nutrition Goals Met: Patient can identify their risk factors for CAD;Patient follows a low sodium diet;Completed Nutritional Risk Screen;Provided Rate your Plate Survey;Reviewed Dietitian schedule;Patient states following a low saturated fat diet;Patient knows appropriate portion size    Height, Weight, and  BMI  Weight: 142 lb (64.4 kg)  Height: 5' 5\" (1.651 m)  BMI: 23.63    Nutrition Follow-up  Follow-up/Discharge: Reviewed components of heart healthy diet, states understanding       Other Risk Factors  Other Risk Factor Assessment: Reassessment    HTN Risk Factor: NA    Pre Exercise BP: 120/60  Post Exercise BP: 124/58    Tobacco Risk Factor: NA    Risk Factor Follow-up   Follow-up/Discharge: Reviewed all risk factors and plan for risk mgmt   PSYCHOSOCIAL  Psychosocial Assessment: Reassessment     [facility] ISSA Q of L Summary Score: 22    QING-D Score: 4    Psychosocial Risk Factor: Stress    Psychosocial Plan  Interventions  Interventions: Offer Spiritual Care consult;Offer educational videos and classes;Provide written material;Individual education and counseling    Education Completed  Education Completed: Relaxation/Coping Techniques;Effects of stress on body    Goals Met  Goals " Met: Identified Support system;Oriented to stress management classes;Identify stressors;Improvement in [employer] score;Practicing stress management skills    Psychosocial Follow-up  Follow-up/Discharge: Reviewed effects of stress and importance of stress mgmt. States understanding       Pt reports no limitations in daily activities. She is exercising on her own most days. Reviewed s/s of exercise intolerance and emergency plan. Pt has a good understanding of her risk factors and plan for risk factor management. Offered Ph III    Patient involved in Goal setting?: Yes    Signature: _____________________________________________________________    Date: __________________    Time: __________________

## 2021-06-16 NOTE — PROGRESS NOTES
[facility] Daily Progress     Patient Name: Jessica Cruz  Date: 2/19/2018  Visit #:4/8 - are  PTA visit #:    Referral Diagnosis: Neck pain, shoulder pain   Referring provider: Consuelo Yates MD  Visit Diagnosis:     ICD-10-CM    1. Bilateral posterior neck pain M54.2    2. Right shoulder tendonitis M75.81    3. Generalized muscle weakness M62.81    4. Poor posture R29.3          Assessment:   Patient continues to report improving neck pain and stiffness. No c/o cervical radicular pain today, although continues to c/o R shoulder pain which did not improve post-injection. HEP was advanced and modified again today as not to provoke additional shoulder pain. She is appropriate for continued PT 1x/week.     Patient is benefitting from skilled physical therapy and is making steady progress toward functional goals.  Patient is appropriate to continue with skilled physical therapy intervention, as indicated by initial plan of care.    Goal Status:  Pt. will be independent with home exercise program in : 2 weeks  Pt will: return to yoga 1x/week for 60' without increased shoulder or neck pain to improve exercise in 8 weeks  Pt will: sleep without waking due to neck pain >4/7 nights/week to improve sleep quality in 8 weeks  Pt will: reduce NDI by >10% for significant change in 8 weeks  Pt will: sit >45 minutes to drive or read without increased neck pain to improve ADL's and safety in 8 weeks    Plan / Patient Education:     Continue with initial plan of care.  Cont PT1x/week.     Subjective:   Pt had a corticosteroid injection last week, felt no relief from this in right shoulder.   She had an EMG but is unsure of the results. Was surprised at how painful the EMG was.    Objective:     Cervical AROM:  Flexion: full no pain   Extension: full no pain   R rotation: 30% loss R sided pain   L rotation: 30% loss R sided pain   Visibly improved B rotation ROM post treatment, no increased pain at end range R and L  rotation    Treatment Today     TREATMENT MINUTES COMMENTS   Evaluation     Self-care/ Home management     Manual therapy 30   Grade III cervical traction x 30 second holds    SO release with gentle traction x 20 seconds holds    Grade II posterior and inferior glide R GH joint with sustained hold and gentle oscillations.      Neuromuscular Re-education     Therapeutic Activity     Therapeutic Exercises 25 HEP modified and progressed as follows:     Exercise #1: Chin tuck   Comment #1: HEP x 10 seconds x 15-20 reps   Exercise #2: Reach and roll- unable to perform today due to pain  Comment #2: HEP x 5 reps, R UE only; pt does not tolerate lying on right shoulder today  Exercise #3: Standing row with L1 band- pt unable to perform standing shoulder extension - good demo today   Comment #3: HEP x 3 seconds x 10-15 reps  Exercise #4: Standing ER L1 band   Comment #4: HEP x 15-20 reps   Exercise #5: Seated levator stretch  Comment #5: HEP x 30 seconds x 2-3 reps    D/c reach and roll due to increased R shoulder pain; pt unable to tolerate R sidelying    Gait training     Modality__________________                Total 55    Blank areas are intentional and mean the treatment did not include these items.       Stacie Mena  2/19/2018

## 2021-06-16 NOTE — PROGRESS NOTES
[facility] Daily Progress     Patient Name: Jessica Cruz  Date: 3/5/2018  Visit #:6/8 - are  [facility] visit #:    Referral Diagnosis: Neck pain, shoulder pain   Referring provider: Consuelo Yates MD  Visit Diagnosis:     ICD-10-CM    1. Bilateral posterior neck pain M54.2    2. Right shoulder tendonitis M75.81          Assessment:   Patient reports decreased shoulder pain since last visit. She appears to have responded well to manual interventions, as she felt relief for 1 week after last treatment. Shoulder AROM however has not improved since last visit. Plan to FU in 1 week and consider decreasing frequency to every other week after.      Patient is benefitting from skilled physical therapy and is making steady progress toward functional goals.  Patient is appropriate to continue with skilled physical therapy intervention, as indicated by initial plan of care.    Goal Status:  Pt. will be independent with home exercise program in : 2 weeks  Pt will: return to yoga 1x/week for 60' without increased shoulder or neck pain to improve exercise in 8 weeks (goal partailly met, continues to experience slight increased pain with both)  Pt will: sleep without waking due to neck pain >4/7 nights/week to improve sleep quality in 8 weeks (goal partially met, continues to have neck pain at night)  Pt will: reduce NDI by >10% for significant change in 8 weeks  Pt will: sit >45 minutes to drive or read without increased neck pain to improve ADL's and safety in 8 weeks (goal met)    Plan / Patient Education:     Continue with initial plan of care.  Cont PT1x/week.     Subjective:   Pt reports she had significant pain relief following treatment, lasting approximately 24 hours. Feels she has maintained some of her ROM.   Pain overall is better than last treatment.     Objective:     Shoulder AROM:  Flexion: to approx 80 with pain   Abduction: to 92 degrees with pain   ER: to posterior neck   IR: to approx PSIS with  pain    Pain pre treatment: 4/10, pain post treatment: 4/10      Treatment Today     TREATMENT MINUTES COMMENTS   Evaluation     Self-care/ Home management     Manual therapy 40   Grade II posterior and inferior glide R GH joint with sustained hold and gentle oscillations  Pec minor release on R x 45 seconds x 3 reps  Subscap release on R with arm overhead x 45 seconds x 3 reps   STM to R scalene with gentle inferior glide grade II to 1st rib     Neuromuscular Re-education     Therapeutic Activity     Therapeutic Exercises 15 Performed today:   See flow sheet for full HEP list.   Exercise #1: Chin tuck supine  Comment #1: HEP, improved demo today's date, x 10 second hold x 15 reps  Exercise #5: Seated levator stretch and seated UT stretch   Comment #5: HEP x 30 seconds x 2-3 reps  Exercise #6: Scap retraction with ER  Comment #6: HEP x 3 seconds x 10-15 rep, L1 band. TC for increasing scap retraction.      Gait training     Modality__________________                Total 55    Blank areas are intentional and mean the treatment did not include these items.       Stacie Mena  3/5/2018

## 2021-06-16 NOTE — PROGRESS NOTES
ITP ASSESSMENT   Assessment Day: 60 Day  Session Number: 11  Precautions: Standard  Diagnosis: Stent  Risk Stratification: Medium  Referring Provider: Dr. Jose Gaming  EXERCISE  Exercise Assessment: Reassessment      Tolerates 40' of exercise at 2.9-3.5 Mets                           Exercise Plan  Goals Next 30 days  ADL'S: Increase home walking to 3 days per week;  Go to Yoga 2 days per week  Leisure: Sort out boxes from recent move  Work: Continue to tolerate volunteering at [employer]    Education Goals: All goals in this section met  Education Goals Met: Patient can state cardiac s/s and appropriate emergency response.;Has system for taking medication.;Medication review.                        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  30 day Work goals met: Has not driven to see Grandchildren   30 Day Progression: Making progress;  Limited by shoulder discomfort    Initial ADL's goals met: Pt is cooking meals - goal met  Initial Leisure goals met: Pt is tolerating laundry without SOB - goal met  Intial Work goals met: Pt has not yet resumed volunteering at [employer], but has resumed [facility] - goal in progress  Initial Progression: Pt states an improvement in tolerance for activities    Exercise Prescription  Exercise Mode: Treadmill;Bike  Frequency: 2 x  week  Duration: 40'  Intensity / THR: 20-30 beats above resting heart rate  RPE 11-14  Progression / Met level: 2.9-3.7  Resistive Training?: Yes    Current Exercise (mins/week): 150    Interventions  Home Exercise:  Mode: Walk/Yoga  Frequency: 3-5 x week  Duration: 30-40'    Education Material : Educational videos;Provide written material;Individual education and counseling;Offer educational classes    Education Completed  Exercise Education Completed: Cardiac Anatomy;Signs and Symptoms;Medication review;RPE;Emergency Plan;Home  "Exercise;Warm up/cool down;FITT Principles;BP/HR Reponse to exercise;Benefits of Exercise;End point of exercise            Exercise Follow-up/Discharge  Follow up/Discharge: Encouraged  walking more consistently,wants to attend Yoga 2 days per week NUTRITION  Nutrition Assessment: Reassessment    Nutrition Risk Factors:  Nutrition Risk Factors: Dyslipidemia    Nutrition Plan  Interventions  Diet Consult: Completed  No Data Recorded  Initial Rate Your Plate Score: 50    Education Completed  Nutrition Education Completed: Low Saturated fat diet    Goals  Nutrition Goals (Next 30 days): Patient will follow a low saturated fat diet    Goals Met  Nutrition Goals Met: Patient states following a low saturated fat diet    Height, Weight, and  BMI  Weight: 141 lb (64 kg)  Height: 5' 5\" (1.651 m)  BMI: 23.46    Nutrition Follow-up  Follow-up/Discharge: Patient stated that she needs to work on eating 3 meals per day.  She stated she frequently skips lunch because she is busy sewing.         Other Risk Factors  Other Risk Factor Assessment: Reassessment    HTN Risk Factor: NA    Pre Exercise BP: 118/62  Post Exercise BP: 110/64          Tobacco Risk Factor: NA      Risk Factor Follow-up   Follow-up/Discharge: Continue to provide risk factor education as needed   PSYCHOSOCIAL  Psychosocial Assessment: Reassessment       Psychosocial Risk Factor: Stress    Psychosocial Plan  Interventions  Interventions: Offer Spiritual Care consult;Offer educational videos and classes;Provide written material;Individual education and counseling    Education Completed  Education Completed: Relaxation/Coping Techniques;Effects of stress on body    Goals  Goals (Next 30 days): Practicing stress management skills    Goals Met  Goals Met: Identified Support system;Oriented to stress management classes;Identify stressors    Psychosocial Follow-up  Follow-up/Discharge: Encouraged her to participate in stress/ relaxation class           Patient involved " in Goal setting?: Yes    Signature: _____________________________________________________________    Date: __________________    Time: __________________

## 2021-06-16 NOTE — PROGRESS NOTES
[facility] Daily Progress     Patient Name: Jessica Cruz  Date: 3/19/2018  Visit #:8/8 - [facility]  PTA visit #:1    Referral Diagnosis: Neck pain, shoulder pain   Referring provider: Consuelo Yates MD  Visit Diagnosis:     ICD-10-CM    1. Bilateral posterior neck pain M54.2    2. Right shoulder tendonitis M75.81    3. Generalized muscle weakness M62.81    4. Poor posture R29.3          Assessment:   Patient presents with increased neck and B shoulder pain. Subjective report of a very busy week last week with cooking/baking, cleaning and sewing.   Pt demonstrates decreased neck and R shoulder ROM today.    Patient is benefitting from skilled physical therapy and is making steady progress toward functional goals.  Patient is appropriate to continue with skilled physical therapy intervention, as indicated by initial plan of care.    Goal Status:  Pt. will be independent with home exercise program in : 2 weeks  Pt will: return to yoga 1x/week for 60' without increased shoulder or neck pain to improve exercise in 8 weeks (goal partailly met, continues to experience slight increased pain with both)  Pt will: sleep without waking due to neck pain >4/7 nights/week to improve sleep quality in 8 weeks (goal partially met, continues to have neck pain at night)  Pt will: reduce NDI by >10% for significant change in 8 weeks  Pt will: sit >45 minutes to drive or read without increased neck pain to improve ADL's and safety in 8 weeks (goal met)    Plan / Patient Education:     Continue with initial plan of care.  Cont PT1x/week.   Pt's primary PT Yulissa Mena will phone pt today.  Pt was instructed to limited sitting, limited time spent looking down and to continue with the ice if helpful.      Subjective:   Pt reports increased neck pain and L UE pain and tingling. Pt reports her neck hurts and the pain goes down into her shoulder blades. She is also having more R shoulder pain.  Pt reports this started last Tuesday.  She reports difficulty sleeping due to pain. She has been using ice and heat packs. She feels that the ice is helping.  Pt reports she had a busy week last week. She did a lot of cleaning (washing windows, polishing furniture). She also reports doing a lot of baking (rolling out cookie dough). Pt states she did some sewing which requires her to look down.   Pt reports she was doing well until last Tuesday.     Pain ratin-6/10 B shoulders     Objective:   Neck ROM: pain with all planes  Flexion: Major loss  Extension:moderate loss   Side bending: R minimal loss, L moderate loss  Rotation: moderate loss B    Shoulder AROM:  Flexion: to 105 degrees  Abduction: to 90 degrees with pain   ER: to posterior neck   IR: to approx PSIS with pain          Treatment Today     TREATMENT MINUTES COMMENTS   Evaluation     Self-care/ Home management     Manual therapy 45   MFR/STM to suboccipitals, B upper traps, MFR to R subscapularis, pec major, supraspinatus and infraspinatus.  Gentle manual cervical traction     Neuromuscular Re-education     Therapeutic Activity     Therapeutic Exercises 10 Verbal review of HEP:  See flow sheet for full HEP list. Exercise #1: Chin tuck supine  Comment #1: HEP, improved demo today's date, x 10 second hold x 15 reps    Exercise #5: Seated levator stretch and seated UT stretch   Comment #5: HEP x 30 seconds x 2-3 reps    Exercise #4: Standing extension with scap retraction L2 band  Comment #4: HEP x 10 reps with TC for scap retraction. Pt tolerates today for 10 reps. Discussed continuing this at home.     Exercise #6: Scap retraction with ER  Comment #6: HEP x 3 seconds x 10-15 rep, L1 band. TC for increasing scap retraction.     No new ex's added to HEP.      Gait training     Modality__________________                Total 55    Blank areas are intentional and mean the treatment did not include these items.       Jessica Martin,FIDE  3/19/2018

## 2021-06-16 NOTE — PROGRESS NOTES
"[facility] Daily Progress     Patient Name: Jessica Cruz  Date: 3/12/2018  Visit #:7/8 - are  [facility] visit #:    Referral Diagnosis: Neck pain, shoulder pain   Referring provider: Consuelo Yates MD  Visit Diagnosis:     ICD-10-CM    1. Bilateral posterior neck pain M54.2    2. Right shoulder tendonitis M75.81    3. Generalized muscle weakness M62.81    4. Poor posture R29.3          Assessment:   Patient again reports improving R shoulder pain- she feels overall pain is less intense, and is sleeping better than previous visit. She continues to display painful ROM and painful arc of motion with significant crepitus today. No change in ROM since las visit. Patient likely has some OA symptoms also contributing to her shoulder pain given her crepitus and chronic shoulder pain. Plan to FU in 2 weeks and progress HEP as tolerated.     Patient is benefitting from skilled physical therapy and is making steady progress toward functional goals.  Patient is appropriate to continue with skilled physical therapy intervention, as indicated by initial plan of care.    Goal Status:  Pt. will be independent with home exercise program in : 2 weeks  Pt will: return to yoga 1x/week for 60' without increased shoulder or neck pain to improve exercise in 8 weeks (goal partailly met, continues to experience slight increased pain with both)  Pt will: sleep without waking due to neck pain >4/7 nights/week to improve sleep quality in 8 weeks (goal partially met, continues to have neck pain at night)  Pt will: reduce NDI by >10% for significant change in 8 weeks  Pt will: sit >45 minutes to drive or read without increased neck pain to improve ADL's and safety in 8 weeks (goal met)    Plan / Patient Education:     Continue with initial plan of care.  Cont PT1x/week.     Subjective:   Pt reports her shoulder pain is improving. She continues to have some R shoulder pain, but not \"the same severity as it was\".   She notices more  " and R UE strength this week.   Pt is sleeping better since last week.     Current pain R shoulder: 2/10, 0/10 pain at rest, 6/10 pain at worst  Worst pain occurs with reaching behind her back, overhead, and with cooking.     Objective:     Shoulder AROM:  Flexion: to 138 degrees with pain at  degree  Abduction: to 90 degrees with pain   ER: to posterior neck   IR: to approx PSIS with pain    Pain pre treatment: 2/10, pain post treatment: 2/10      Treatment Today     TREATMENT MINUTES COMMENTS   Evaluation     Self-care/ Home management     Manual therapy 40   Grade II posterior and inferior glide R GH joint with sustained hold and gentle oscillations  Pec minor release on R x 45 seconds x 3 reps  Subscap release on R with arm overhead x 45 seconds x 3 reps. Pt reports pain referral to   STM to R scalene with gentle inferior glide grade II to 1st rib  Trp release to R UT x 45 sec each point x 3 reps each     Neuromuscular Re-education     Therapeutic Activity     Therapeutic Exercises 15 Performed today:   See flow sheet for full HEP list. Exercise #1: Chin tuck supine  Comment #1: HEP, improved demo today's date, x 10 second hold x 15 reps    Exercise #5: Seated levator stretch and seated UT stretch   Comment #5: HEP x 30 seconds x 2-3 reps    Exercise #4: Standing extension with scap retraction L2 band  Comment #4: HEP x 10 reps with TC for scap retraction. Pt tolerates today for 10 reps. Discussed continuing this at home.     Exercise #6: Scap retraction with ER  Comment #6: HEP x 3 seconds x 10-15 rep, L1 band. TC for increasing scap retraction.     No new ex's added to HEP.      Gait training     Modality__________________                Total 55    Blank areas are intentional and mean the treatment did not include these items.       Stacie Mena  3/12/2018

## 2021-06-16 NOTE — PROGRESS NOTES
[facility] Daily Progress     Patient Name: Jessica Cruz  Date: 2/26/2018  Visit #:5/8 - [facility]  PTA visit #:    Referral Diagnosis: Neck pain, shoulder pain   Referring provider: Consuelo Yates MD  Visit Diagnosis:     ICD-10-CM    1. Bilateral posterior neck pain M54.2    2. Right shoulder tendonitis M75.81    3. Generalized muscle weakness M62.81    4. Poor posture R29.3          Assessment:   Patient c/o increased right shoulder pain following her second corticosteroid injection. She displays significant ROM restrictions in R shoulder pre treatment which did improve drastically following manual therapy. Pt reports decreased pain from 6/10 to 2/10 in R shoulder at end of appt today. HEP was modified again today with additional scapular strengthening.     Patient is benefitting from skilled physical therapy and is making steady progress toward functional goals.  Patient is appropriate to continue with skilled physical therapy intervention, as indicated by initial plan of care.    Goal Status:  Pt. will be independent with home exercise program in : 2 weeks  Pt will: return to yoga 1x/week for 60' without increased shoulder or neck pain to improve exercise in 8 weeks  Pt will: sleep without waking due to neck pain >4/7 nights/week to improve sleep quality in 8 weeks  Pt will: reduce NDI by >10% for significant change in 8 weeks  Pt will: sit >45 minutes to drive or read without increased neck pain to improve ADL's and safety in 8 weeks    Plan / Patient Education:     Continue with initial plan of care.  Cont PT1x/week.     Subjective:   Pt reports she had another corticosteroid injection on 2/23/18.   No relief found. Estimates 6/10 pain in her right shoulder. Feels her pain is worse after the second injection.     Objective:     Shoulder AROM:  Flexion: 70 degrees with pain, improved to 168 degrees with slight pain post treatment   Abduction: 46 degrees with pain, improved to 80 degrees with pain  post treatment.   ER: to posterior neck   IR: to approx PSIS       Treatment Today     TREATMENT MINUTES COMMENTS   Evaluation     Self-care/ Home management     Manual therapy 40   Grade II posterior and inferior glide R GH joint with sustained hold and gentle oscillations  Pec minor release on R x 45 seconds x 3 reps  Subscap release on R with arm overhead x 45 seconds x 3 reps   STM to R scalene with gentle inferior glide grade II to 1st rib     Neuromuscular Re-education     Therapeutic Activity     Therapeutic Exercises 15 HEP modified and progressed as follows:   Exercise #5: Seated levator stretch and seated UT stretch   Comment #5: HEP x 30 seconds x 2-3 reps  Exercise #6: Scap retraction with ER  Comment #6: HEP x 3 seconds x 10-15 rep, L1 band. TC for increasing scap retraction.      Gait training     Modality__________________                Total 55    Blank areas are intentional and mean the treatment did not include these items.       Stacie Mena  2/26/2018

## 2021-06-16 NOTE — PROGRESS NOTES
[facility] Daily Progress     Patient Name: Jessica Cruz  Date: 3/26/2018  Visit #:9/12 - [facility] (4 additional visits approved)  [facility] visit #:1    Referral Diagnosis: Neck pain, shoulder pain   Referring provider: Consuelo Yates MD  Visit Diagnosis:     ICD-10-CM    1. Bilateral posterior neck pain M54.2    2. Generalized muscle weakness M62.81    3. Poor posture R29.3    4. Right shoulder tendonitis M75.81          Assessment:   Patient continues to c/o fluctuating R shoulder and L neck and radicular pain. She previously experienced increased pain likely related to overuse, and some of this pain has improved as she is closer to previous baseline pain.  HEP was progressed with addition of cervical extensor strengthening; however, pt does not tolerate progression of shoulder or scapular strengthening due to pain. Modified current HEP again to avoid pain provocation.     Patient is benefitting from skilled physical therapy and is making steady progress toward functional goals.  Patient is appropriate to continue with skilled physical therapy intervention, as indicated by initial plan of care.    Goal Status:  Pt. will be independent with home exercise program in : 2 weeks  Pt will: return to yoga 1x/week for 60' without increased shoulder or neck pain to improve exercise in 8 weeks (goal partailly met, continues to experience slight increased pain with both)  Pt will: sleep without waking due to neck pain >4/7 nights/week to improve sleep quality in 8 weeks (goal partially met, continues to have neck pain at night)  Pt will: reduce NDI by >10% for significant change in 8 weeks  Pt will: sit >45 minutes to drive or read without increased neck pain to improve ADL's and safety in 8 weeks (goal met)    Plan / Patient Education:     Continue with initial plan of care.  Cont PT1x/week.   Pt's primary PT Yulissa Mena will phone pt today.  Pt was instructed to limited sitting, limited time spent looking down and to  continue with the ice if helpful.    Subjective:   Pt reports her L UE pain and neck pain comes and goes, but is getting better slowly.  Also feels her right shoulder pain comes and goes. Pt is frustrated her pain is not improving faster.   Pt skipped yoga at the community [employer] on Friday and went on the bike/treadmill instead, which seemed to help her pain.     Pain ratin-6/10 B shoulders     Objective:     Shoulder AROM:  L shoulder flx: full no pain  R shoulder flx: to approximately 110 degrees with pain; pt stops in ROM due to pain      Treatment Today     TREATMENT MINUTES COMMENTS   Evaluation     Self-care/ Home management     Manual therapy 30 Gentle manual cervical traction grade III x 45 seconds x 5 reps.   SO release x 45 seconds x 2 reps.   TrP release to B UT x 30 sec hold each point.     R subscap release x 45 seconds x 3 reps     Neuromuscular Re-education     Therapeutic Activity     Therapeutic Exercises 25 Reviewed and progressed HEP:  Exercises:  Exercise #1: Chin tuck - advanced today with addition of chin tuck with head pressure x 10 seconds x 15-20 reps  Comment #1: HEP x 10 seconds x 15-20 reps   Exercise #2: Reach and roll- discontinued  Comment #2: HEP x 5 reps, R UE only; pt does not tolerate lying on right shoulder today  Exercise #3: Standing row with L1 band- again, pt unable to perform standing shoulder extension - good demo today although cues needed to keep elbows at side  Comment #3: HEP x 3 seconds x 10-15 reps  Exercise #4: Standing ER L1 band - unable to tolerate, so still discontinued   Comment #4: HEP x 15-20 reps   Exercise #5: Seated levator stretch  Comment #5: HEP x 30 seconds x 2-3 reps  Exercise #6: Scap retraction with ER- cues for thumbs up improves pain overall  Comment #6: HEP x 3 seconds x 10-15 rep       Gait training     Modality__________________                Total 55    Blank areas are intentional and mean the treatment did not include these items.        Stacie Mena  PT, DPT  3/26/2018

## 2021-06-17 NOTE — PROGRESS NOTES
"[facility] Daily Progress     Patient Name: Jessica Cruz  Date: 4/10/2018  Visit #:11/12 - [facility] (4 additional visits approved)  [facility] visit #:1    Referral Diagnosis: Neck pain, shoulder pain   Referring provider: Consuelo Yates MD  Visit Diagnosis:     ICD-10-CM    1. Bilateral posterior neck pain M54.2    2. Generalized muscle weakness M62.81    3. Poor posture R29.3    4. Right shoulder tendonitis M75.81          Assessment:   Patient has consistently reported improved shoulder and cervical pain. She has been able to resume volunteer activities without increased pain although requires reminders to take breaks between her activities during the day. Shoulder AROM has improved to this date with less pain overall. Plan to FU in 2 weeks.     Patient is benefitting from skilled physical therapy and is making steady progress toward functional goals.  Patient is appropriate to continue with skilled physical therapy intervention, as indicated by initial plan of care.    Goal Status:  Pt. will be independent with home exercise program in : 2 weeks  Pt will: return to yoga 1x/week for 60' without increased shoulder or neck pain to improve exercise in 8 weeks (goal partailly met, continues to experience slight increased pain with both)  Pt will: sleep without waking due to neck pain >4/7 nights/week to improve sleep quality in 8 weeks (goal partially met, continues to have neck pain at night)  Pt will: reduce NDI by >10% for significant change in 8 weeks  Pt will: sit >45 minutes to drive or read without increased neck pain to improve ADL's and safety in 8 weeks (goal met)    Plan / Patient Education:     Continue with initial plan of care.  FU in 2 weeks.     Subjective:   Pt reports she had a \"good\" week. She has been able to return back to volunteering at [employer]. She did an entire day of sewing which made her right arm and shoulder very sore.   Felt slightly sore after last visit, but then pain improved. No " "pain in left arm in >1 week.     Pain rating: L shoulder 1-2/10, R shoulder 3-4/10      Objective:     Shoulder AROM:  R shoulder flexion: full, slight pain, slow with movement  R shoulder abduction: full with slight pain in painful arc    Cervical AROM:  Flexion: full \"pull\"   Extension: full \"discomfort\" on L side of neck  R rotation: full slight pain   L rotation: full slight \"tenderness\"       Treatment Today     TREATMENT MINUTES COMMENTS   Evaluation     Self-care/ Home management     Manual therapy 45 Gentle manual cervical traction grade III x 45 seconds x 5 reps.   SO release x 45 seconds x 2 reps.   TrP release to B UT x 30 sec hold each point, followed by STM with B stretch to B UT and levator.     Grade II posterior and inferior glides with oscillations to R shoulder for pain.     R subscap release x 45 seconds x 3 reps     Neuromuscular Re-education     Therapeutic Activity     Therapeutic Exercises 10 Verbally reviewed HEP.     Discussed how to perform SO release at home using tennis balls- used two different models for patient to try in clinic. Performed with gentle chin tuck for slight stretch at SO.     See flowsheet for full HEP list.        Gait training     Modality__________________                Total 55    Blank areas are intentional and mean the treatment did not include these items.       Stacie Mena  PT, DPT  4/10/2018  "

## 2021-06-17 NOTE — PROGRESS NOTES
[facility] Daily Progress     Patient Name: Jessica Cruz  Date: 4/3/2018  Visit #:10/12 - [facility] (4 additional visits approved)  [facility] visit #:1    Referral Diagnosis: Neck pain, shoulder pain   Referring provider: Consuelo Yates MD  Visit Diagnosis:     ICD-10-CM    1. Bilateral posterior neck pain M54.2    2. Generalized muscle weakness M62.81    3. Poor posture R29.3    4. Right shoulder tendonitis M75.81          Assessment:   Patient continues to experience R shoulder fluctuating pain, but overall pain is improving. Pt is no longer modifying her daily activities due to pain.   She has been able to perform full HEP consistently without increased R shoulder or L UE pain, and feels her left neck and left radicular symptoms have mostly resolved.     Patient is benefitting from skilled physical therapy and is making steady progress toward functional goals.  Patient is appropriate to continue with skilled physical therapy intervention, as indicated by initial plan of care.    Goal Status:  Pt. will be independent with home exercise program in : 2 weeks  Pt will: return to yoga 1x/week for 60' without increased shoulder or neck pain to improve exercise in 8 weeks (goal partailly met, continues to experience slight increased pain with both)  Pt will: sleep without waking due to neck pain >4/7 nights/week to improve sleep quality in 8 weeks (goal partially met, continues to have neck pain at night)  Pt will: reduce NDI by >10% for significant change in 8 weeks  Pt will: sit >45 minutes to drive or read without increased neck pain to improve ADL's and safety in 8 weeks (goal met)    Plan / Patient Education:     Continue with initial plan of care.  Cont PT1x/week.     Subjective:   Pt reports her left arm and shoulder has improved, pain has nearly resolved except for slight L sided neck stiffness. Notices more pain in her neck and upper back with sitting on hard chairs.   Right shoulder pain continues to  "fluctuate, but is improving overall.   She has been able to do most of her exercises consistently. Pt is sleeping better.     Pain rating: L shoulder 1-2/10, R shoulder 3-4/10      Objective:     Shoulder AROM:  R shoulder flexion: full, slight pain, slow with movement  R shoulder abduction: to approx 92 degrees with pain     Cervical AROM:  Flexion: full \"pull\"   Extension: full \"discomfort\" on L side of neck  R rotation: full no pain   L rotation: minimal restriction, but no pain      Treatment Today     TREATMENT MINUTES COMMENTS   Evaluation     Self-care/ Home management     Manual therapy 40 Gentle manual cervical traction grade III x 45 seconds x 5 reps.   SO release x 45 seconds x 2 reps.   TrP release to B UT x 30 sec hold each point, followed by STM with B stretch to B UT and levator.     Grade II posterior and inferior glides with oscillations to R shoulder for pain.     R subscap release x 45 seconds x 3 reps     Neuromuscular Re-education     Therapeutic Activity     Therapeutic Exercises 15 Reviewed and progressed HEP:  Exercises:  Exercise #1: Chin tuck - advanced today with addition of chin tuck with head pressure x 10 seconds x 15-20 reps  Comment #1: HEP x 10 seconds x 15-20 reps   Exercise #6: Scap retraction with ER- cues for thumbs up improves pain overall  Comment #6: HEP x 3 seconds x 10-15 rep  Exercise #7: Pec stretch  Comment #7: HEP W in doorway, cues for openining chest, in pain free motion. x 30 seconds x 2-3 reps     See flowsheet for full HEP list.        Gait training     Modality__________________                Total 55    Blank areas are intentional and mean the treatment did not include these items.       Stacie Mena  PT, DPT  4/3/2018  "

## 2021-06-17 NOTE — PROGRESS NOTES
[facility] Discharge Summary  Patient Name: Jessica Cruz  Date: 6/5/2018  Referral Diagnosis: Neck pain, shoulder pain   Referring provider: Consuelo Yates MD  Visit Diagnosis:   1. Bilateral posterior neck pain     2. Generalized muscle weakness     3. Poor posture     4. Right shoulder tendonitis         Goals:  Pt. will be independent with home exercise program in : 2 weeks;Met  Pt will: return to yoga 1x/week for 60' without increased shoulder or neck pain to improve exercise in 8 weeks (goal not met, continues to experience slight increased pain with both)  Pt will: sleep without waking due to neck pain >4/7 nights/week to improve sleep quality in 8 weeks (goal met)  Pt will: reduce NDI by >10% for significant change in 8 weeks  Pt will: sit >45 minutes to drive or read without increased neck pain to improve ADL's and safety in 8 weeks (goal met)    Patient was seen for 12 visits from initial evaluation to 5/8/18 with 0 missed appointments.  The patient met goals and has demonstrated understanding of and independence in the home program for self-care, and progression to next steps.  She will initiate contact if questions or concerns arise.    Therapy will be discontinued at this time.  The patient will need a new referral to resume.    Thank you for your referral.  Stacie Loeraalandanie  6/5/2018  11:45 AM      [facility] Daily Progress     Patient Name: Jessica Cruz  Date: 5/8/2018  Visit #:12/12 - [facility] (4 additional visits approved)  \[facility] [place] [facility]\"" visit #:1    Referral Diagnosis: Neck pain, shoulder pain   Referring provider: Consuelo Yates MD  Visit Diagnosis:     ICD-10-CM    1. Bilateral posterior neck pain M54.2    2. Generalized muscle weakness M62.81    3. Poor posture R29.3    4. Right shoulder tendonitis M75.81          Assessment:   Patient has been seen for 12 PT visits since evaluation. She has made significant improvement in shoulder and neck pain since evaluation. Goals are partially  "met, as pt continues to have persistent R shoulder pain with reaching, yoga, and lifting activities- despite 2 corticosteroid injections and physical therapy. Pt will FU with PCP and ortho regarding continued R shoulder pain. Pt has been given HEP to continue independently upon d/c from PT.   Plan to hold chart open 30 days before formal d/c from PT.    Goal Status:  Pt. will be independent with home exercise program in : 2 weeks;Met  Pt will: return to yoga 1x/week for 60' without increased shoulder or neck pain to improve exercise in 8 weeks (goal not met, continues to experience slight increased pain with both)  Pt will: sleep without waking due to neck pain >4/7 nights/week to improve sleep quality in 8 weeks (goal met)  Pt will: reduce NDI by >10% for significant change in 8 weeks  Pt will: sit >45 minutes to drive or read without increased neck pain to improve ADL's and safety in 8 weeks (goal met)    Plan / Patient Education:     D/c from PT. Pt to FU with PCP and ortho regarding continued pain.     Subjective:   Pt returns to PT today following a family . Pt was forced re-scheduled today's visit due to  plans. She reports her neck pain has subsided tremendously since evaluation. She continues to experience R shoulder pain with reaching and with yoga. She does feel her symptoms have improve since last visit, but feels her pain is at a \"stand still\".      Pain rating: L shoulder 1-2/10, R shoulder 3-4/10      Objective:     Shoulder AROM:  R shoulder flexion: full, no armando   R shoulder abduction: full with slight pain in painful arc 90 degrees   Functional IR: full slight pain   Functional ER: full no pain     Cervical AROM:  Flexion: full \"pull\"   Extension: full \"discomfort\" on L side of neck  R rotation: full non painful   L rotation: full slight tenderness       Treatment Today     TREATMENT MINUTES COMMENTS   Evaluation     Self-care/ Home management     Manual therapy 45 Gentle manual " cervical traction grade III x 45 seconds x 5 reps.   SO release x 45 seconds x 2 reps.   TrP release to B UT x 30 sec hold each point, followed by STM with B stretch to B UT and levator.     Grade II posterior and inferior glides with oscillations to R shoulder for pain.     R subscap release x 45 seconds x 4 reps     Neuromuscular Re-education     Therapeutic Activity     Therapeutic Exercises 10 Verbally reviewed HEP.   Current HEP to continue independently.   Exercise #1: Chin tuck - advanced today with addition of chin tuck with head pressure x 10 seconds x 15-20 reps  Comment #1: HEP x 10 seconds x 15-20 reps   Exercise #2: Reach and roll- discontinued  Comment #2: HEP x 5 reps, R UE only; pt does not tolerate lying on right shoulder today  Exercise #3: Standing row with L1 band- again, pt unable to perform standing shoulder extension - good demo today although cues needed to keep elbows at side  Comment #3: HEP x 3 seconds x 10-15 reps  Exercise #4: Standing ER L1 band - unable to tolerate, so still discontinued   Comment #4: HEP x 15-20 reps   Exercise #5: Seated levator stretch  Comment #5: HEP x 30 seconds x 2-3 reps  Exercise #6: Scap retraction with ER- cues for thumbs up improves pain overall  Comment #6: HEP x 3 seconds x 10-15 rep  Exercise #7: Pec stretch  Comment #7: HEP W in doorway, cues for openining chest, in pain free motion. x 30 seconds x 2-3 reps     See flowsheet for full HEP list.        Gait training     Modality__________________                Total 55    Blank areas are intentional and mean the treatment did not include these items.       Stacie Mena  PT, DPT  5/8/2018

## 2021-09-26 ENCOUNTER — HEALTH MAINTENANCE LETTER (OUTPATIENT)
Age: 81
End: 2021-09-26

## 2022-07-03 ENCOUNTER — HEALTH MAINTENANCE LETTER (OUTPATIENT)
Age: 82
End: 2022-07-03

## 2023-01-14 ENCOUNTER — HEALTH MAINTENANCE LETTER (OUTPATIENT)
Age: 83
End: 2023-01-14

## 2023-07-16 ENCOUNTER — HEALTH MAINTENANCE LETTER (OUTPATIENT)
Age: 83
End: 2023-07-16

## 2023-11-13 ENCOUNTER — HOSPITAL ENCOUNTER (EMERGENCY)
Facility: HOSPITAL | Age: 83
Discharge: HOME | End: 2023-11-13
Attending: EMERGENCY MEDICINE | Admitting: EMERGENCY MEDICINE
Payer: COMMERCIAL

## 2023-11-13 ENCOUNTER — APPOINTMENT (OUTPATIENT)
Dept: CT IMAGING | Facility: HOSPITAL | Age: 83
End: 2023-11-13
Attending: EMERGENCY MEDICINE
Payer: COMMERCIAL

## 2023-11-13 VITALS
DIASTOLIC BLOOD PRESSURE: 81 MMHG | TEMPERATURE: 99.7 F | SYSTOLIC BLOOD PRESSURE: 117 MMHG | HEART RATE: 73 BPM | RESPIRATION RATE: 23 BRPM | HEIGHT: 64 IN | BODY MASS INDEX: 22.2 KG/M2 | WEIGHT: 130 LBS | OXYGEN SATURATION: 99 %

## 2023-11-13 DIAGNOSIS — R91.8 PULMONARY NODULES: ICD-10-CM

## 2023-11-13 DIAGNOSIS — R05.3 CHRONIC COUGH: ICD-10-CM

## 2023-11-13 LAB
ALBUMIN SERPL BCG-MCNC: 3.5 G/DL (ref 3.5–5.2)
ALP SERPL-CCNC: 63 U/L (ref 35–104)
ALT SERPL W P-5'-P-CCNC: 15 U/L (ref 0–50)
ANION GAP SERPL CALCULATED.3IONS-SCNC: 12 MMOL/L (ref 7–15)
APTT PPP: 34 SECONDS (ref 22–38)
AST SERPL W P-5'-P-CCNC: 16 U/L (ref 0–45)
BASOPHILS # BLD AUTO: 0 10E3/UL (ref 0–0.2)
BASOPHILS NFR BLD AUTO: 0 %
BILIRUB SERPL-MCNC: 0.9 MG/DL
BUN SERPL-MCNC: 6.5 MG/DL (ref 8–23)
CALCIUM SERPL-MCNC: 8.8 MG/DL (ref 8.8–10.2)
CHLORIDE SERPL-SCNC: 93 MMOL/L (ref 98–107)
CREAT SERPL-MCNC: 0.51 MG/DL (ref 0.51–0.95)
D DIMER PPP FEU-MCNC: 4.32 UG/ML FEU (ref 0–0.5)
DEPRECATED HCO3 PLAS-SCNC: 25 MMOL/L (ref 22–29)
EGFRCR SERPLBLD CKD-EPI 2021: >90 ML/MIN/1.73M2
EOSINOPHIL # BLD AUTO: 0.2 10E3/UL (ref 0–0.7)
EOSINOPHIL NFR BLD AUTO: 2 %
ERYTHROCYTE [DISTWIDTH] IN BLOOD BY AUTOMATED COUNT: 11.9 % (ref 10–15)
GLUCOSE SERPL-MCNC: 133 MG/DL (ref 70–99)
HCT VFR BLD AUTO: 30.2 % (ref 35–47)
HGB BLD-MCNC: 10.2 G/DL (ref 11.7–15.7)
IMM GRANULOCYTES # BLD: 0.1 10E3/UL
IMM GRANULOCYTES NFR BLD: 1 %
INR PPP: 1.18 (ref 0.85–1.15)
LYMPHOCYTES # BLD AUTO: 1.6 10E3/UL (ref 0.8–5.3)
LYMPHOCYTES NFR BLD AUTO: 13 %
MAGNESIUM SERPL-MCNC: 1.9 MG/DL (ref 1.7–2.3)
MCH RBC QN AUTO: 28.7 PG (ref 26.5–33)
MCHC RBC AUTO-ENTMCNC: 33.8 G/DL (ref 31.5–36.5)
MCV RBC AUTO: 85 FL (ref 78–100)
MONOCYTES # BLD AUTO: 1 10E3/UL (ref 0–1.3)
MONOCYTES NFR BLD AUTO: 8 %
NEUTROPHILS # BLD AUTO: 9.3 10E3/UL (ref 1.6–8.3)
NEUTROPHILS NFR BLD AUTO: 76 %
NRBC # BLD AUTO: 0 10E3/UL
NRBC BLD AUTO-RTO: 0 /100
NT-PROBNP SERPL-MCNC: 1257 PG/ML (ref 0–1800)
PLATELET # BLD AUTO: 393 10E3/UL (ref 150–450)
POTASSIUM SERPL-SCNC: 3.9 MMOL/L (ref 3.4–5.3)
PROT SERPL-MCNC: 7 G/DL (ref 6.4–8.3)
RBC # BLD AUTO: 3.56 10E6/UL (ref 3.8–5.2)
SODIUM SERPL-SCNC: 130 MMOL/L (ref 135–145)
TROPONIN T SERPL HS-MCNC: 12 NG/L
WBC # BLD AUTO: 12.3 10E3/UL (ref 4–11)

## 2023-11-13 PROCEDURE — 84484 ASSAY OF TROPONIN QUANT: CPT | Performed by: EMERGENCY MEDICINE

## 2023-11-13 PROCEDURE — 250N000011 HC RX IP 250 OP 636: Mod: JZ | Performed by: EMERGENCY MEDICINE

## 2023-11-13 PROCEDURE — 36415 COLL VENOUS BLD VENIPUNCTURE: CPT | Performed by: EMERGENCY MEDICINE

## 2023-11-13 PROCEDURE — 93005 ELECTROCARDIOGRAM TRACING: CPT | Performed by: EMERGENCY MEDICINE

## 2023-11-13 PROCEDURE — 83880 ASSAY OF NATRIURETIC PEPTIDE: CPT | Performed by: EMERGENCY MEDICINE

## 2023-11-13 PROCEDURE — 80053 COMPREHEN METABOLIC PANEL: CPT | Performed by: EMERGENCY MEDICINE

## 2023-11-13 PROCEDURE — 83735 ASSAY OF MAGNESIUM: CPT | Performed by: EMERGENCY MEDICINE

## 2023-11-13 PROCEDURE — 99285 EMERGENCY DEPT VISIT HI MDM: CPT | Mod: 25

## 2023-11-13 PROCEDURE — 71275 CT ANGIOGRAPHY CHEST: CPT

## 2023-11-13 PROCEDURE — 85004 AUTOMATED DIFF WBC COUNT: CPT | Performed by: EMERGENCY MEDICINE

## 2023-11-13 PROCEDURE — 85610 PROTHROMBIN TIME: CPT | Performed by: EMERGENCY MEDICINE

## 2023-11-13 PROCEDURE — 250N000013 HC RX MED GY IP 250 OP 250 PS 637: Performed by: EMERGENCY MEDICINE

## 2023-11-13 PROCEDURE — 85379 FIBRIN DEGRADATION QUANT: CPT | Performed by: EMERGENCY MEDICINE

## 2023-11-13 PROCEDURE — 85730 THROMBOPLASTIN TIME PARTIAL: CPT | Performed by: EMERGENCY MEDICINE

## 2023-11-13 PROCEDURE — 250N000009 HC RX 250: Performed by: EMERGENCY MEDICINE

## 2023-11-13 RX ORDER — BENZONATATE 100 MG/1
100 CAPSULE ORAL 3 TIMES DAILY PRN
Status: DISCONTINUED | OUTPATIENT
Start: 2023-11-13 | End: 2023-11-13 | Stop reason: HOSPADM

## 2023-11-13 RX ORDER — IOPAMIDOL 755 MG/ML
90 INJECTION, SOLUTION INTRAVASCULAR ONCE
Status: COMPLETED | OUTPATIENT
Start: 2023-11-13 | End: 2023-11-13

## 2023-11-13 RX ORDER — AZITHROMYCIN 250 MG/1
TABLET, FILM COATED ORAL
Qty: 6 TABLET | Refills: 0 | Status: SHIPPED | OUTPATIENT
Start: 2023-11-13 | End: 2023-11-18

## 2023-11-13 RX ORDER — BENZONATATE 100 MG/1
100 CAPSULE ORAL 3 TIMES DAILY PRN
Qty: 15 CAPSULE | Refills: 0 | Status: ON HOLD | OUTPATIENT
Start: 2023-11-13 | End: 2023-11-24

## 2023-11-13 RX ORDER — IPRATROPIUM BROMIDE AND ALBUTEROL SULFATE 2.5; .5 MG/3ML; MG/3ML
3 SOLUTION RESPIRATORY (INHALATION) ONCE
Status: COMPLETED | OUTPATIENT
Start: 2023-11-13 | End: 2023-11-13

## 2023-11-13 RX ADMIN — IOPAMIDOL 90 ML: 755 INJECTION, SOLUTION INTRAVENOUS at 12:49

## 2023-11-13 RX ADMIN — BENZONATATE 100 MG: 100 CAPSULE ORAL at 12:12

## 2023-11-13 RX ADMIN — IPRATROPIUM BROMIDE AND ALBUTEROL SULFATE 3 ML: .5; 3 SOLUTION RESPIRATORY (INHALATION) at 11:56

## 2023-11-13 ASSESSMENT — ACTIVITIES OF DAILY LIVING (ADL)
ADLS_ACUITY_SCORE: 37
ADLS_ACUITY_SCORE: 35

## 2023-11-13 ASSESSMENT — ENCOUNTER SYMPTOMS
SHORTNESS OF BREATH: 1
COUGH: 1

## 2023-11-13 NOTE — ED PROVIDER NOTES
EMERGENCY DEPARTMENT ENCOUNTER      NAME: Jessica Cruz  AGE: 83 year old female  YOB: 1940  MRN: [id number]  EVALUATION DATE & TIME: 2023 10:11 AM    PCP: No primary care provider on file.    ED PROVIDER: Maurisio Thao DO      Chief Complaint   Patient presents with    Shortness of Breath         FINAL IMPRESSION:  1. Chronic cough    2. Pulmonary nodules          ED COURSE & MEDICAL DECISION MAKIN-year-old female presented to the ED for evaluation of a chronic cough that has been ongoing for the last 1-1/2 months since having a COVID infection.  Patient notes that the cough is worse at night and often keeps her awake from sleep.  Patient also reported chest discomfort and mild shortness of breath.  Here in the ED the patient is slightly tachycardic upon arrival.  She is otherwise hemodynamically stable with O2 sats in the upper 90s on room air.  On exam the patient was noted to have mild tachycardia.  Her lungs were clear to auscultation and the remainder physical exam was unremarkable.      Following her initial evaluation the patient was given a dose of Tessalon and a DuoNeb breathing treatment.    The EKG revealed normal sinus rhythm with a right bundle block and nonspecific ST segment changes.    White blood cell count was slightly elevated at 12.3.  Hemoglobin was slightly low at 10.2.    The CBC, CMP, and magnesium were reassuring.  Patient's troponin and BNP are both unremarkable.  TSH was normal.  The patient's D-dimer was elevated at 4.3.     The patient was reevaluated and informed of the initial laboratory results.    CT scan of the chest shows small lingular and left lower lobe nodules which are likely inflammatory in nature.  Outpatient follow-up CT scan was recommended in 3 months time.    The patient was reevaluated and informed of the lab and imaging results.  The patient states that her cough improved substantially after receiving the Tessalon.  The patient was informed  that her chronic cough may be related to postnasal drip or chronic inflammatory changes within the lungs.  After educating and reassuring the patient and her  they felt comfortable returning home.  The patient was sent home with Tessalon Perls to use as needed for any further episodes of coughing.  She was also sent home with a 5-day course of azithromycin given the inflammatory nodules noted on the chest x-ray.  The patient declined steroids.  A referral was placed so that the patient could establish care with a primary care provider within the area.  The patient was instructed to follow-up with the primary care provider for reevaluation and to discuss a repeat CT scan in 3 months time.  The patient was instructed to return back to ED sooner for any worsening respiratory difficulty or any other new or concerning symptoms    Pertinent Labs & Imaging studies reviewed. (See chart for details)  10:30 I met with the patient to gather history and to perform my initial exam. We discussed plans for the ED course, including diagnostic testing and treatment.   2:04 PM I rechecked and updated the patient        At the conclusion of the encounter I discussed the results of all of the tests and the disposition. The questions were answered. The patient or family acknowledged understanding and was agreeable with the care plan.     Medical Decision Making    History:  Supplemental history from: Documented in chart, if applicable and Family Member/Significant Other  External Record(s) reviewed: Documented in chart, if applicable.    Work Up:  Chart documentation includes differential considered and any EKGs or imaging independently interpreted by provider, where specified.  In additional to work up documented, I considered the following work up: Documented in chart, if applicable.    External consultation:  Discussion of management with another provider: Documented in chart, if applicable    Complicating factors:  Care  impacted by chronic illness: Heart Disease  Care affected by social determinants of health: Access to Medical Care    Disposition considerations: Discharge. I prescribed additional prescription strength medication(s) as charted. See documentation for any additional details.      PPE worn: n95 mask, goggles    MEDICATIONS GIVEN IN THE EMERGENCY:  Medications   ipratropium - albuterol 0.5 mg/2.5 mg/3 mL (DUONEB) neb solution 3 mL (3 mLs Nebulization $Given 11/13/23 4330)   iopamidol (ISOVUE-370) solution 90 mL (90 mLs Intravenous $Given 11/13/23 7259)       NEW PRESCRIPTIONS STARTED AT TODAY'S ER VISIT  Discharge Medication List as of 11/13/2023  2:09 PM        START taking these medications    Details   azithromycin (ZITHROMAX) 250 MG tablet Take 2 tablets (500 mg) by mouth daily for 1 day, THEN 1 tablet (250 mg) daily for 4 days., Disp-6 tablet, R-0, E-Prescribe      benzonatate (TESSALON) 100 MG capsule Take 1 capsule (100 mg) by mouth 3 times daily as needed for cough, Disp-15 capsule, R-0, E-Prescribe                =================================================================    HPI    Patient information was obtained from: Patient      Jessica Cruz is a 83 year old female with a pertinent history of coronary artery disease and hyperlipidemia who presents to this ED for evaluation of evaluation of a chronic cough has been ongoing for a month and a half.  Patient was initially diagnosed with COVID in the end of September.  Since that time the patient has been treated with an antibiotic, albuterol inhaler, and a single dose of prednisone.  Patient stated that her symptoms seem to improve with the antibiotic that she finished a few weeks ago.  Patient notes that the cough is worse at night and often keeps her awake.  The patient also reports mild associated shortness of breath as well as chest tightness that been ongoing for the last week.  She also reports pain in both lower extremities, right greater than  left.  Patient also reports ear pain.  The patient had no other complaints or symptoms.      REVIEW OF SYSTEMS   Review of Systems   HENT:  Positive for ear pain.    Respiratory:  Positive for cough and shortness of breath.    Cardiovascular:  Positive for chest pain.        PAST MEDICAL HISTORY:  Past Medical History:   Diagnosis Date    Aortic regurgitation     Colon cancer (H)     Coronary artery disease     previous PCI    Diverticulosis     History of anesthesia complications     Hyperlipidemia     PONV (postoperative nausea and vomiting)        PAST SURGICAL HISTORY:  Past Surgical History:   Procedure Laterality Date    ANGIOPLASTY      ZZC PARTIAL HIP REPLACEMENT Left 6/2/2020    Procedure: HEMIARTHROPLASTY, HIP, BIPOLAR LEFT;  Surgeon: Jorge Luis Aceves MD;  [place] [facility] [place]  Service: [place]opedics           CURRENT MEDICATIONS:    azithromycin (ZITHROMAX) 250 MG tablet  benzonatate (TESSALON) 100 MG capsule  aspirin 81 mg chewable tablet  atorvastatin (LIPITOR) 10 MG tablet  cholecalciferol, vitamin D3, 1,000 unit tablet  coenzyme Q10 (CO Q-10) 100 mg capsule  HYDROcodone-acetaminophen 5-325 mg per tablet  isosorbide mononitrate (IMDUR) 30 MG 24 hr tablet  Lactobacillus rhamnosus GG (CULTURELLE) 10-15 Billion cell capsule  levothyroxine (SYNTHROID, LEVOTHROID) 88 MCG tablet  multivitamin therapeutic tablet  nitroglycerin (NITROSTAT) 0.4 MG SL tablet  pantoprazole (PROTONIX) 40 MG tablet  polyethylene glycol (MIRALAX) 17 gram packet  psyllium (METAMUCIL) 3.4 gram packet        ALLERGIES:  Allergies   Allergen Reactions    Ace Inhibitors Other (See Comments)     Angioedema per Dr Сергей Krishnamurthy., Pt developed facial bruising and headache after taking lisinopil., See 6/27/16 telephone encounter, Lisinopril , Angioedema per Dr Сергей Krishnamurthy., Pt developed facial bruising and headache after taking lisinopil., See 6/27/16 telephone encounter, Angioedema per Dr Сергей Krishnamurthy., Pt developed facial bruising and  "headache after taking lisinopil., See 6/27/16 telephone encounter    Latex Unknown and Rash     \"rash with latex gloves\"    Adhesive Tape-Silicones [Adhesive Tape] Dermatitis     Severe Blisters wherever adhesive touches, Her skin. , Paper tape is OK    Amoxicillin Other (See Comments)     Pt states oral amoxicillin \"doesn't work, the infection keeps coming back\"    Pravastatin Unknown     Leg cramps    Prinivil [Lisinopril] Unknown     bruising    Senna Other (See Comments)     Colon inflamation    Temazepam Other (See Comments)     Balance problem,s    Trazodone Unknown     Tongue swelling; anxiety    Alendronate [Alendronate] Unknown and Other (See Comments)     \"constipated\", \"constipated\"    Flonase [Fluticasone] Rash    Metoprolol Succinate [Metoprolol] Rash    Simvastatin Rash    Sulfa (Sulfonamide Antibiotics) [Sulfa Antibiotics] Rash       FAMILY HISTORY:  No family history on file.    SOCIAL HISTORY:   Social History     Socioeconomic History    Marital status:    Tobacco Use    Smoking status: Former    Smokeless tobacco: Never   Substance and Sexual Activity    Alcohol use: Not Currently     Alcohol/week: 7.0 standard drinks of alcohol    Drug use: Never       VITALS:  /81   Pulse 73   Temp 99.7  F (37.6  C)   Resp 23   Ht 1.626 m (5' 4\")   Wt 59 kg (130 lb)   SpO2 99%   BMI 22.31 kg/m      PHYSICAL EXAM    General presentation: Alert, Vital signs reviewed. NAD  HENT: ENT inspection is normal. Oropharynx is moist and clear. Audible congestion noted.   Eye: Pupils are equal and reactive to light. EOMI  Neck: The neck is supple, with full ROM, with no evidence of meningismus.  Pulmonary: Currently in no acute respiratory distress. Normal, non labored respirations, the lung sounds are normal with good equal air movement. Clear to auscultation bilaterally.   Circulatory: Tachycardia with regular rhythm. Peripheral pulses are strong and equal. No murmurs, rubs, or gallops.   Abdominal: The " abdomen is soft. Nontender. No rigidity, guarding, or rebound. Bowel sounds normal.   Neurologic: Alert, oriented to person, place, and time. No motor deficit. No sensory deficit. Cranial nerves II through XII are intact.  Musculoskeletal: No extremity tenderness. Full range of motion in all extremities. No extremity edema.   Skin: Skin color is normal. No rash. Warm. Dry to touch.      LAB:  All pertinent labs reviewed and interpreted.  Results for orders placed or performed during the hospital encounter of 11/13/23   CT Chest Pulmonary Embolism w Contrast    Impression    IMPRESSION:  1.  No pulmonary embolism or acute aortic pathology.   2.  Impacted right proximal humerus fracture, which is age indeterminate. Correlate with pain.  3.  Small lingular and left lower lobe nodules, which are likely inflammatory though recommend follow-up CT chest in 3 months.   Result Value Ref Range    INR 1.18 (H) 0.85 - 1.15   Partial thromboplastin time   Result Value Ref Range    aPTT 34 22 - 38 Seconds   Comprehensive metabolic panel   Result Value Ref Range    Sodium 130 (L) 135 - 145 mmol/L    Potassium 3.9 3.4 - 5.3 mmol/L    Carbon Dioxide (CO2) 25 22 - 29 mmol/L    Anion Gap 12 7 - 15 mmol/L    Urea Nitrogen 6.5 (L) 8.0 - 23.0 mg/dL    Creatinine 0.51 0.51 - 0.95 mg/dL    GFR Estimate >90 >60 mL/min/1.73m2    Calcium 8.8 8.8 - 10.2 mg/dL    Chloride 93 (L) 98 - 107 mmol/L    Glucose 133 (H) 70 - 99 mg/dL    Alkaline Phosphatase 63 35 - 104 U/L    AST 16 0 - 45 U/L    ALT 15 0 - 50 U/L    Protein Total 7.0 6.4 - 8.3 g/dL    Albumin 3.5 3.5 - 5.2 g/dL    Bilirubin Total 0.9 <=1.2 mg/dL   Result Value Ref Range    Magnesium 1.9 1.7 - 2.3 mg/dL   Result Value Ref Range    Troponin T, High Sensitivity 12 <=14 ng/L   Nt probnp inpatient   Result Value Ref Range    N terminal Pro BNP Inpatient 1,257 0 - 1,800 pg/mL   D dimer quantitative   Result Value Ref Range    D-Dimer Quantitative 4.32 (H) 0.00 - 0.50 ug/mL FEU   CBC with  platelets and differential   Result Value Ref Range    WBC Count 12.3 (H) 4.0 - 11.0 10e3/uL    RBC Count 3.56 (L) 3.80 - 5.20 10e6/uL    Hemoglobin 10.2 (L) 11.7 - 15.7 g/dL    Hematocrit 30.2 (L) 35.0 - 47.0 %    MCV 85 78 - 100 fL    MCH 28.7 26.5 - 33.0 pg    MCHC 33.8 31.5 - 36.5 g/dL    RDW 11.9 10.0 - 15.0 %    Platelet Count 393 150 - 450 10e3/uL    % Neutrophils 76 %    % Lymphocytes 13 %    % Monocytes 8 %    % Eosinophils 2 %    % Basophils 0 %    % Immature Granulocytes 1 %    NRBCs per 100 WBC 0 <1 /100    Absolute Neutrophils 9.3 (H) 1.6 - 8.3 10e3/uL    Absolute Lymphocytes 1.6 0.8 - 5.3 10e3/uL    Absolute Monocytes 1.0 0.0 - 1.3 10e3/uL    Absolute Eosinophils 0.2 0.0 - 0.7 10e3/uL    Absolute Basophils 0.0 0.0 - 0.2 10e3/uL    Absolute Immature Granulocytes 0.1 <=0.4 10e3/uL    Absolute NRBCs 0.0 10e3/uL       RADIOLOGY:  Reviewed all pertinent imaging. Please see official radiology report.  CT Chest Pulmonary Embolism w Contrast   Final Result   IMPRESSION:   1.  No pulmonary embolism or acute aortic pathology.    2.  Impacted right proximal humerus fracture, which is age indeterminate. Correlate with pain.   3.  Small lingular and left lower lobe nodules, which are likely inflammatory though recommend follow-up CT chest in 3 months.          EKG:    Normal sinus rhythm.  Rate of 92.  Recommend branch block.  Normal QT.  Nonspecific ST segment changes which are likely due to the rapid rate block are noted.  Compared to EKG on 6/1/2020 the right upper metabolic has replaced an incomplete right branch block.     I have independently reviewed and interpreted the EKG(s) documented above.        I, Yasmani Rondon , am serving as a scribe to document services personally performed by Maurisio Thao, DO based on my observation and the provider's statements to me. I, Maurisio Thao, attest that Yasmani Rondon  is acting in a scribe capacity, has observed my performance of the services and has documented them in accordance  with my direction.    Maurisio Thao DO  Emergency Medicine  [facility] EMERGENCY DEPARTMENT  [facility] [facility] [id number] [phone]       Maurisio Thao DO  11/13/23 7316

## 2023-11-13 NOTE — ED TRIAGE NOTES
"Patient to triage via wheelchair with . Was diagnosis with COVID Sept 28. Developed into congestion in lungs, was diagnosises with bronchitis in urgent care, was started on antibiotics and given inhaler. Antibiotic completed, and cough continues. Here for cough, inability to sleep well, \"I took a sleeping pill last night to sleep, I am tires. My doctor said this is all related to Covid and it might take awhile to get better\"        "

## 2023-11-13 NOTE — DISCHARGE INSTRUCTIONS
Your laboratory tests all appear today in the emergency department.  The chest CT scan shows some inflammatory nodules which may be related to your chronic cough.  The most likely causes of chronic cough at this time is a postnasal drip.      Take the prescribed antibiotic azithromycin daily as directed for the next 5 days.  Use the Tessalon Perles as needed for any further coughing episodes.  A referral has been placed for you to establish care with a primary care provider.  Want to establish care with a primary care provider is recommended to undergo an outpatient chest CT scan in 3 months time to reevaluate the pulmonary nodules that were noted today in the ED.  Return back to ED sooner for any worsening respiratory difficulty, cough, or any other new or concerning symptoms.

## 2023-11-20 LAB
ATRIAL RATE - MUSE: 92 BPM
DIASTOLIC BLOOD PRESSURE - MUSE: 60 MMHG
INTERPRETATION ECG - MUSE: NORMAL
P AXIS - MUSE: 123 DEGREES
PR INTERVAL - MUSE: 180 MS
QRS DURATION - MUSE: 152 MS
QT - MUSE: 392 MS
QTC - MUSE: 484 MS
R AXIS - MUSE: -78 DEGREES
SYSTOLIC BLOOD PRESSURE - MUSE: 136 MMHG
T AXIS - MUSE: 140 DEGREES
VENTRICULAR RATE- MUSE: 92 BPM

## 2023-11-21 ENCOUNTER — HOSPITAL ENCOUNTER (INPATIENT)
Facility: HOSPITAL | Age: 83
LOS: 2 days | Discharge: HOME | DRG: 872 | End: 2023-11-24
Attending: FAMILY MEDICINE | Admitting: HOSPITALIST
Payer: COMMERCIAL

## 2023-11-21 ENCOUNTER — APPOINTMENT (OUTPATIENT)
Dept: RADIOLOGY | Facility: HOSPITAL | Age: 83
DRG: 872 | End: 2023-11-21
Attending: FAMILY MEDICINE
Payer: COMMERCIAL

## 2023-11-21 DIAGNOSIS — M25.50 ARTHRALGIA, UNSPECIFIED JOINT: ICD-10-CM

## 2023-11-21 DIAGNOSIS — N30.01 ACUTE CYSTITIS WITH HEMATURIA: Primary | ICD-10-CM

## 2023-11-21 DIAGNOSIS — M62.81 GENERALIZED MUSCLE WEAKNESS: Chronic | ICD-10-CM

## 2023-11-21 DIAGNOSIS — E87.1 HYPONATREMIA: ICD-10-CM

## 2023-11-21 DIAGNOSIS — E22.2 SIADH (SYNDROME OF INAPPROPRIATE ADH PRODUCTION) (H): ICD-10-CM

## 2023-11-21 DIAGNOSIS — R31.0 GROSS HEMATURIA: ICD-10-CM

## 2023-11-21 DIAGNOSIS — R50.9 FEVER, UNSPECIFIED FEVER CAUSE: ICD-10-CM

## 2023-11-21 DIAGNOSIS — R05.2 SUBACUTE COUGH: ICD-10-CM

## 2023-11-21 LAB
ANION GAP SERPL CALCULATED.3IONS-SCNC: 11 MMOL/L (ref 7–15)
BASOPHILS # BLD AUTO: 0.1 10E3/UL (ref 0–0.2)
BASOPHILS NFR BLD AUTO: 0 %
BUN SERPL-MCNC: 8.7 MG/DL (ref 8–23)
CALCIUM SERPL-MCNC: 9.5 MG/DL (ref 8.8–10.2)
CHLORIDE SERPL-SCNC: 92 MMOL/L (ref 98–107)
CREAT SERPL-MCNC: 0.49 MG/DL (ref 0.51–0.95)
CRP SERPL-MCNC: 79.2 MG/L
DEPRECATED HCO3 PLAS-SCNC: 24 MMOL/L (ref 22–29)
EGFRCR SERPLBLD CKD-EPI 2021: >90 ML/MIN/1.73M2
EOSINOPHIL # BLD AUTO: 0.2 10E3/UL (ref 0–0.7)
EOSINOPHIL NFR BLD AUTO: 1 %
ERYTHROCYTE [DISTWIDTH] IN BLOOD BY AUTOMATED COUNT: 11.8 % (ref 10–15)
GLUCOSE SERPL-MCNC: 131 MG/DL (ref 70–99)
HCT VFR BLD AUTO: 28.1 % (ref 35–47)
HGB BLD-MCNC: 9.6 G/DL (ref 11.7–15.7)
HOLD SPECIMEN: NORMAL
IMM GRANULOCYTES # BLD: 0.1 10E3/UL
IMM GRANULOCYTES NFR BLD: 0 %
INR PPP: 1.21 (ref 0.85–1.15)
LACTATE SERPL-SCNC: 1.7 MMOL/L (ref 0.7–2)
LYMPHOCYTES # BLD AUTO: 1.9 10E3/UL (ref 0.8–5.3)
LYMPHOCYTES NFR BLD AUTO: 14 %
MAGNESIUM SERPL-MCNC: 2 MG/DL (ref 1.7–2.3)
MCH RBC QN AUTO: 28.1 PG (ref 26.5–33)
MCHC RBC AUTO-ENTMCNC: 34.2 G/DL (ref 31.5–36.5)
MCV RBC AUTO: 82 FL (ref 78–100)
MONOCYTES # BLD AUTO: 1.3 10E3/UL (ref 0–1.3)
MONOCYTES NFR BLD AUTO: 10 %
NEUTROPHILS # BLD AUTO: 10.1 10E3/UL (ref 1.6–8.3)
NEUTROPHILS NFR BLD AUTO: 75 %
NRBC # BLD AUTO: 0 10E3/UL
NRBC BLD AUTO-RTO: 0 /100
PLATELET # BLD AUTO: 466 10E3/UL (ref 150–450)
POTASSIUM SERPL-SCNC: 3.6 MMOL/L (ref 3.4–5.3)
PROCALCITONIN SERPL IA-MCNC: 0.06 NG/ML
RBC # BLD AUTO: 3.42 10E6/UL (ref 3.8–5.2)
SODIUM SERPL-SCNC: 127 MMOL/L (ref 135–145)
WBC # BLD AUTO: 13.6 10E3/UL (ref 4–11)

## 2023-11-21 PROCEDURE — 83605 ASSAY OF LACTIC ACID: CPT | Performed by: FAMILY MEDICINE

## 2023-11-21 PROCEDURE — 83880 ASSAY OF NATRIURETIC PEPTIDE: CPT | Performed by: HOSPITALIST

## 2023-11-21 PROCEDURE — 83735 ASSAY OF MAGNESIUM: CPT | Performed by: FAMILY MEDICINE

## 2023-11-21 PROCEDURE — 84439 ASSAY OF FREE THYROXINE: CPT | Performed by: HOSPITALIST

## 2023-11-21 PROCEDURE — 87040 BLOOD CULTURE FOR BACTERIA: CPT | Performed by: FAMILY MEDICINE

## 2023-11-21 PROCEDURE — 96374 THER/PROPH/DIAG INJ IV PUSH: CPT | Mod: 59

## 2023-11-21 PROCEDURE — 84300 ASSAY OF URINE SODIUM: CPT | Performed by: HOSPITALIST

## 2023-11-21 PROCEDURE — 85025 COMPLETE CBC W/AUTO DIFF WBC: CPT | Performed by: FAMILY MEDICINE

## 2023-11-21 PROCEDURE — 96375 TX/PRO/DX INJ NEW DRUG ADDON: CPT

## 2023-11-21 PROCEDURE — 86140 C-REACTIVE PROTEIN: CPT | Performed by: FAMILY MEDICINE

## 2023-11-21 PROCEDURE — 80048 BASIC METABOLIC PNL TOTAL CA: CPT | Performed by: FAMILY MEDICINE

## 2023-11-21 PROCEDURE — 84145 PROCALCITONIN (PCT): CPT | Performed by: FAMILY MEDICINE

## 2023-11-21 PROCEDURE — 83935 ASSAY OF URINE OSMOLALITY: CPT | Performed by: HOSPITALIST

## 2023-11-21 PROCEDURE — 86036 ANCA SCREEN EACH ANTIBODY: CPT | Performed by: HOSPITALIST

## 2023-11-21 PROCEDURE — 84443 ASSAY THYROID STIM HORMONE: CPT | Performed by: HOSPITALIST

## 2023-11-21 PROCEDURE — 99285 EMERGENCY DEPT VISIT HI MDM: CPT | Mod: 25

## 2023-11-21 PROCEDURE — 86037 ANCA TITER EACH ANTIBODY: CPT | Performed by: HOSPITALIST

## 2023-11-21 PROCEDURE — 85610 PROTHROMBIN TIME: CPT | Performed by: FAMILY MEDICINE

## 2023-11-21 PROCEDURE — 84484 ASSAY OF TROPONIN QUANT: CPT | Performed by: HOSPITALIST

## 2023-11-21 PROCEDURE — 86038 ANTINUCLEAR ANTIBODIES: CPT | Performed by: HOSPITALIST

## 2023-11-21 PROCEDURE — 86431 RHEUMATOID FACTOR QUANT: CPT | Performed by: HOSPITALIST

## 2023-11-21 PROCEDURE — 71046 X-RAY EXAM CHEST 2 VIEWS: CPT

## 2023-11-21 PROCEDURE — 81001 URINALYSIS AUTO W/SCOPE: CPT | Performed by: FAMILY MEDICINE

## 2023-11-21 PROCEDURE — 36415 COLL VENOUS BLD VENIPUNCTURE: CPT | Performed by: FAMILY MEDICINE

## 2023-11-21 ASSESSMENT — ACTIVITIES OF DAILY LIVING (ADL): ADLS_ACUITY_SCORE: 35

## 2023-11-22 ENCOUNTER — APPOINTMENT (OUTPATIENT)
Dept: CARDIOLOGY | Facility: HOSPITAL | Age: 83
DRG: 872 | End: 2023-11-22
Attending: HOSPITALIST
Payer: COMMERCIAL

## 2023-11-22 ENCOUNTER — APPOINTMENT (OUTPATIENT)
Dept: CT IMAGING | Facility: HOSPITAL | Age: 83
DRG: 872 | End: 2023-11-22
Attending: FAMILY MEDICINE
Payer: COMMERCIAL

## 2023-11-22 PROBLEM — E87.1 HYPONATREMIA: Status: ACTIVE | Noted: 2020-06-01

## 2023-11-22 PROBLEM — D64.9 ANEMIA: Status: ACTIVE | Noted: 2020-06-01

## 2023-11-22 PROBLEM — R05.2 SUBACUTE COUGH: Status: ACTIVE | Noted: 2023-11-22

## 2023-11-22 PROBLEM — R50.9 FEVER, UNSPECIFIED FEVER CAUSE: Status: ACTIVE | Noted: 2023-11-22

## 2023-11-22 PROBLEM — N30.01 ACUTE CYSTITIS WITH HEMATURIA: Status: ACTIVE | Noted: 2023-11-22

## 2023-11-22 PROBLEM — M25.50 ARTHRALGIA, UNSPECIFIED JOINT: Status: ACTIVE | Noted: 2023-11-22

## 2023-11-22 PROBLEM — R31.0 GROSS HEMATURIA: Status: ACTIVE | Noted: 2023-11-22

## 2023-11-22 PROBLEM — R04.2 HEMOPTYSIS: Status: ACTIVE | Noted: 2023-11-22

## 2023-11-22 LAB
ALBUMIN SERPL BCG-MCNC: 3.3 G/DL (ref 3.5–5.2)
ALBUMIN UR-MCNC: NEGATIVE MG/DL
ALP SERPL-CCNC: 66 U/L (ref 40–150)
ALT SERPL W P-5'-P-CCNC: 43 U/L (ref 0–50)
ANION GAP SERPL CALCULATED.3IONS-SCNC: 11 MMOL/L (ref 7–15)
APPEARANCE UR: CLEAR
AST SERPL W P-5'-P-CCNC: 45 U/L (ref 0–45)
BACTERIA #/AREA URNS HPF: ABNORMAL /HPF
BACTERIA SPT CULT: NORMAL
BILIRUB DIRECT SERPL-MCNC: 0.22 MG/DL (ref 0–0.3)
BILIRUB SERPL-MCNC: 0.7 MG/DL
BILIRUB UR QL STRIP: NEGATIVE
BUN SERPL-MCNC: 5.7 MG/DL (ref 8–23)
CALCIUM SERPL-MCNC: 8.7 MG/DL (ref 8.8–10.2)
CHLORIDE SERPL-SCNC: 91 MMOL/L (ref 98–107)
COLOR UR AUTO: ABNORMAL
CREAT SERPL-MCNC: 0.52 MG/DL (ref 0.51–0.95)
DEPRECATED HCO3 PLAS-SCNC: 28 MMOL/L (ref 22–29)
EGFRCR SERPLBLD CKD-EPI 2021: >90 ML/MIN/1.73M2
ERYTHROCYTE [DISTWIDTH] IN BLOOD BY AUTOMATED COUNT: 11.9 % (ref 10–15)
ERYTHROCYTE [SEDIMENTATION RATE] IN BLOOD BY WESTERGREN METHOD: 40 MM/HR (ref 0–30)
GLUCOSE SERPL-MCNC: 106 MG/DL (ref 70–99)
GLUCOSE UR STRIP-MCNC: NEGATIVE MG/DL
GRAM STAIN RESULT: NORMAL
HCT VFR BLD AUTO: 31 % (ref 35–47)
HGB BLD-MCNC: 10.5 G/DL (ref 11.7–15.7)
HGB UR QL STRIP: ABNORMAL
KETONES UR STRIP-MCNC: NEGATIVE MG/DL
L PNEUMO1 AG UR QL IA: NEGATIVE
LEUKOCYTE ESTERASE UR QL STRIP: ABNORMAL
LVEF ECHO: NORMAL
MCH RBC QN AUTO: 27.7 PG (ref 26.5–33)
MCHC RBC AUTO-ENTMCNC: 33.9 G/DL (ref 31.5–36.5)
MCV RBC AUTO: 82 FL (ref 78–100)
NITRATE UR QL: NEGATIVE
NT-PROBNP SERPL-MCNC: 1141 PG/ML (ref 0–1800)
OSMOLALITY UR: 256 MMOL/KG (ref 100–1200)
PH UR STRIP: 7.5 [PH] (ref 5–7)
PHOSPHATE SERPL-MCNC: 3.6 MG/DL (ref 2.5–4.5)
PLATELET # BLD AUTO: 471 10E3/UL (ref 150–450)
POTASSIUM SERPL-SCNC: 2.8 MMOL/L (ref 3.4–5.3)
POTASSIUM SERPL-SCNC: 3.1 MMOL/L (ref 3.4–5.3)
PROT SERPL-MCNC: 6.7 G/DL (ref 6.4–8.3)
RBC # BLD AUTO: 3.79 10E6/UL (ref 3.8–5.2)
RBC URINE: 30 /HPF
RHEUMATOID FACT SERPL-ACNC: <10 IU/ML
S PNEUM AG SPEC QL: NEGATIVE
SODIUM SERPL-SCNC: 130 MMOL/L (ref 135–145)
SODIUM UR-SCNC: 57 MMOL/L
SP GR UR STRIP: 1.01 (ref 1–1.03)
SQUAMOUS EPITHELIAL: <1 /HPF
T4 FREE SERPL-MCNC: 1.59 NG/DL (ref 0.9–1.7)
TROPONIN T SERPL HS-MCNC: 20 NG/L
TROPONIN T SERPL HS-MCNC: 22 NG/L
TSH SERPL DL<=0.005 MIU/L-ACNC: 4.62 UIU/ML (ref 0.3–4.2)
UROBILINOGEN UR STRIP-MCNC: <2 MG/DL
WBC # BLD AUTO: 11.4 10E3/UL (ref 4–11)
WBC URINE: 9 /HPF

## 2023-11-22 PROCEDURE — 82248 BILIRUBIN DIRECT: CPT | Performed by: HOSPITALIST

## 2023-11-22 PROCEDURE — 85027 COMPLETE CBC AUTOMATED: CPT | Performed by: HOSPITALIST

## 2023-11-22 PROCEDURE — 250N000011 HC RX IP 250 OP 636: Mod: JZ | Performed by: FAMILY MEDICINE

## 2023-11-22 PROCEDURE — 87070 CULTURE OTHR SPECIMN AEROBIC: CPT | Performed by: HOSPITALIST

## 2023-11-22 PROCEDURE — 87899 AGENT NOS ASSAY W/OPTIC: CPT | Performed by: HOSPITALIST

## 2023-11-22 PROCEDURE — 87205 SMEAR GRAM STAIN: CPT | Performed by: HOSPITALIST

## 2023-11-22 PROCEDURE — 99207 PR APP CREDIT; MD BILLING SHARED VISIT: CPT | Performed by: STUDENT IN AN ORGANIZED HEALTH CARE EDUCATION/TRAINING PROGRAM

## 2023-11-22 PROCEDURE — 120N000001 HC R&B MED SURG/OB

## 2023-11-22 PROCEDURE — 99222 1ST HOSP IP/OBS MODERATE 55: CPT | Performed by: INTERNAL MEDICINE

## 2023-11-22 PROCEDURE — 99223 1ST HOSP IP/OBS HIGH 75: CPT | Performed by: HOSPITALIST

## 2023-11-22 PROCEDURE — 87086 URINE CULTURE/COLONY COUNT: CPT | Performed by: STUDENT IN AN ORGANIZED HEALTH CARE EDUCATION/TRAINING PROGRAM

## 2023-11-22 PROCEDURE — 80053 COMPREHEN METABOLIC PANEL: CPT | Performed by: HOSPITALIST

## 2023-11-22 PROCEDURE — 250N000011 HC RX IP 250 OP 636: Mod: JZ | Performed by: HOSPITALIST

## 2023-11-22 PROCEDURE — 84484 ASSAY OF TROPONIN QUANT: CPT | Performed by: STUDENT IN AN ORGANIZED HEALTH CARE EDUCATION/TRAINING PROGRAM

## 2023-11-22 PROCEDURE — 250N000013 HC RX MED GY IP 250 OP 250 PS 637: Performed by: HOSPITALIST

## 2023-11-22 PROCEDURE — 36415 COLL VENOUS BLD VENIPUNCTURE: CPT | Performed by: STUDENT IN AN ORGANIZED HEALTH CARE EDUCATION/TRAINING PROGRAM

## 2023-11-22 PROCEDURE — 84132 ASSAY OF SERUM POTASSIUM: CPT | Performed by: STUDENT IN AN ORGANIZED HEALTH CARE EDUCATION/TRAINING PROGRAM

## 2023-11-22 PROCEDURE — 36415 COLL VENOUS BLD VENIPUNCTURE: CPT | Performed by: HOSPITALIST

## 2023-11-22 PROCEDURE — 93306 TTE W/DOPPLER COMPLETE: CPT | Mod: 26 | Performed by: INTERNAL MEDICINE

## 2023-11-22 PROCEDURE — 84100 ASSAY OF PHOSPHORUS: CPT | Performed by: HOSPITALIST

## 2023-11-22 PROCEDURE — 85652 RBC SED RATE AUTOMATED: CPT | Performed by: HOSPITALIST

## 2023-11-22 PROCEDURE — 93306 TTE W/DOPPLER COMPLETE: CPT

## 2023-11-22 PROCEDURE — 74177 CT ABD & PELVIS W/CONTRAST: CPT

## 2023-11-22 RX ORDER — KETOROLAC TROMETHAMINE 15 MG/ML
15 INJECTION, SOLUTION INTRAMUSCULAR; INTRAVENOUS ONCE
Status: COMPLETED | OUTPATIENT
Start: 2023-11-22 | End: 2023-11-22

## 2023-11-22 RX ORDER — LEVOTHYROXINE SODIUM 88 UG/1
88 TABLET ORAL DAILY
Status: DISCONTINUED | OUTPATIENT
Start: 2023-11-22 | End: 2023-11-24 | Stop reason: HOSPADM

## 2023-11-22 RX ORDER — NITROGLYCERIN 0.4 MG/1
0.4 TABLET SUBLINGUAL EVERY 5 MIN PRN
Status: DISCONTINUED | OUTPATIENT
Start: 2023-11-22 | End: 2023-11-24 | Stop reason: HOSPADM

## 2023-11-22 RX ORDER — POLYETHYLENE GLYCOL 3350 17 G/17G
17 POWDER, FOR SOLUTION ORAL 2 TIMES DAILY
Status: DISCONTINUED | OUTPATIENT
Start: 2023-11-22 | End: 2023-11-23

## 2023-11-22 RX ORDER — NALOXONE HYDROCHLORIDE 0.4 MG/ML
0.2 INJECTION, SOLUTION INTRAMUSCULAR; INTRAVENOUS; SUBCUTANEOUS
Status: DISCONTINUED | OUTPATIENT
Start: 2023-11-22 | End: 2023-11-24 | Stop reason: HOSPADM

## 2023-11-22 RX ORDER — ACETAMINOPHEN 325 MG/1
650 TABLET ORAL EVERY 4 HOURS PRN
Status: DISCONTINUED | OUTPATIENT
Start: 2023-11-22 | End: 2023-11-24 | Stop reason: HOSPADM

## 2023-11-22 RX ORDER — NALOXONE HYDROCHLORIDE 0.4 MG/ML
0.4 INJECTION, SOLUTION INTRAMUSCULAR; INTRAVENOUS; SUBCUTANEOUS
Status: DISCONTINUED | OUTPATIENT
Start: 2023-11-22 | End: 2023-11-24 | Stop reason: HOSPADM

## 2023-11-22 RX ORDER — HYDRALAZINE HYDROCHLORIDE 20 MG/ML
10 INJECTION INTRAMUSCULAR; INTRAVENOUS EVERY 6 HOURS PRN
Status: DISCONTINUED | OUTPATIENT
Start: 2023-11-22 | End: 2023-11-24 | Stop reason: HOSPADM

## 2023-11-22 RX ORDER — CEFTRIAXONE 1 G/1
1 INJECTION, POWDER, FOR SOLUTION INTRAMUSCULAR; INTRAVENOUS ONCE
Status: COMPLETED | OUTPATIENT
Start: 2023-11-22 | End: 2023-11-22

## 2023-11-22 RX ORDER — ALBUTEROL SULFATE 5 MG/ML
2.5 SOLUTION RESPIRATORY (INHALATION) EVERY 6 HOURS PRN
Status: DISCONTINUED | OUTPATIENT
Start: 2023-11-22 | End: 2023-11-24 | Stop reason: HOSPADM

## 2023-11-22 RX ORDER — ATORVASTATIN CALCIUM 10 MG/1
10 TABLET, FILM COATED ORAL AT BEDTIME
Status: DISCONTINUED | OUTPATIENT
Start: 2023-11-22 | End: 2023-11-24 | Stop reason: HOSPADM

## 2023-11-22 RX ORDER — GUAIFENESIN 200 MG/10ML
200 LIQUID ORAL EVERY 4 HOURS PRN
Status: DISCONTINUED | OUTPATIENT
Start: 2023-11-22 | End: 2023-11-24 | Stop reason: HOSPADM

## 2023-11-22 RX ORDER — CALCIUM CARBONATE 500 MG/1
1000 TABLET, CHEWABLE ORAL 4 TIMES DAILY PRN
Status: DISCONTINUED | OUTPATIENT
Start: 2023-11-22 | End: 2023-11-24 | Stop reason: HOSPADM

## 2023-11-22 RX ORDER — CEFTRIAXONE 1 G/1
1 INJECTION, POWDER, FOR SOLUTION INTRAMUSCULAR; INTRAVENOUS EVERY 24 HOURS
Status: DISCONTINUED | OUTPATIENT
Start: 2023-11-22 | End: 2023-11-24 | Stop reason: HOSPADM

## 2023-11-22 RX ORDER — ISOSORBIDE MONONITRATE 30 MG/1
30 TABLET, EXTENDED RELEASE ORAL DAILY
Status: DISCONTINUED | OUTPATIENT
Start: 2023-11-22 | End: 2023-11-24 | Stop reason: HOSPADM

## 2023-11-22 RX ORDER — ACETAMINOPHEN 650 MG/1
650 SUPPOSITORY RECTAL EVERY 4 HOURS PRN
Status: DISCONTINUED | OUTPATIENT
Start: 2023-11-22 | End: 2023-11-24 | Stop reason: HOSPADM

## 2023-11-22 RX ORDER — ONDANSETRON 4 MG/1
4 TABLET, ORALLY DISINTEGRATING ORAL EVERY 6 HOURS PRN
Status: DISCONTINUED | OUTPATIENT
Start: 2023-11-22 | End: 2023-11-24 | Stop reason: HOSPADM

## 2023-11-22 RX ORDER — ONDANSETRON 2 MG/ML
4 INJECTION INTRAMUSCULAR; INTRAVENOUS EVERY 6 HOURS PRN
Status: DISCONTINUED | OUTPATIENT
Start: 2023-11-22 | End: 2023-11-24 | Stop reason: HOSPADM

## 2023-11-22 RX ORDER — OXYCODONE HYDROCHLORIDE 5 MG/1
5 TABLET ORAL EVERY 4 HOURS PRN
Status: DISCONTINUED | OUTPATIENT
Start: 2023-11-22 | End: 2023-11-24 | Stop reason: HOSPADM

## 2023-11-22 RX ORDER — BENZONATATE 100 MG/1
100 CAPSULE ORAL 3 TIMES DAILY PRN
Status: DISCONTINUED | OUTPATIENT
Start: 2023-11-22 | End: 2023-11-24 | Stop reason: HOSPADM

## 2023-11-22 RX ORDER — CHLORAL HYDRATE 500 MG
1 CAPSULE ORAL
COMMUNITY

## 2023-11-22 RX ORDER — LIDOCAINE 40 MG/G
CREAM TOPICAL
Status: DISCONTINUED | OUTPATIENT
Start: 2023-11-22 | End: 2023-11-24 | Stop reason: HOSPADM

## 2023-11-22 RX ORDER — FUROSEMIDE 10 MG/ML
40 INJECTION INTRAMUSCULAR; INTRAVENOUS ONCE
Status: COMPLETED | OUTPATIENT
Start: 2023-11-22 | End: 2023-11-22

## 2023-11-22 RX ORDER — IOPAMIDOL 755 MG/ML
75 INJECTION, SOLUTION INTRAVASCULAR ONCE
Status: COMPLETED | OUTPATIENT
Start: 2023-11-22 | End: 2023-11-22

## 2023-11-22 RX ADMIN — ATORVASTATIN CALCIUM 10 MG: 10 TABLET, FILM COATED ORAL at 21:06

## 2023-11-22 RX ADMIN — PSYLLIUM HUSK 2 PACKET: 3.4 POWDER ORAL at 08:43

## 2023-11-22 RX ADMIN — POLYETHYLENE GLYCOL 3350 17 G: 17 POWDER, FOR SOLUTION ORAL at 08:39

## 2023-11-22 RX ADMIN — KETOROLAC TROMETHAMINE 15 MG: 15 INJECTION, SOLUTION INTRAMUSCULAR; INTRAVENOUS at 02:13

## 2023-11-22 RX ADMIN — IOPAMIDOL 75 ML: 755 INJECTION, SOLUTION INTRAVENOUS at 01:10

## 2023-11-22 RX ADMIN — BENZONATATE 100 MG: 100 CAPSULE ORAL at 20:26

## 2023-11-22 RX ADMIN — FUROSEMIDE 40 MG: 10 INJECTION, SOLUTION INTRAMUSCULAR; INTRAVENOUS at 06:47

## 2023-11-22 RX ADMIN — CEFTRIAXONE SODIUM 1 G: 1 INJECTION, POWDER, FOR SOLUTION INTRAMUSCULAR; INTRAVENOUS at 21:07

## 2023-11-22 RX ADMIN — ISOSORBIDE MONONITRATE 30 MG: 30 TABLET, EXTENDED RELEASE ORAL at 08:38

## 2023-11-22 RX ADMIN — LEVOTHYROXINE SODIUM 88 MCG: 88 TABLET ORAL at 08:38

## 2023-11-22 RX ADMIN — CEFTRIAXONE SODIUM 1 G: 1 INJECTION, POWDER, FOR SOLUTION INTRAMUSCULAR; INTRAVENOUS at 02:13

## 2023-11-22 RX ADMIN — POLYETHYLENE GLYCOL 3350 17 G: 17 POWDER, FOR SOLUTION ORAL at 20:27

## 2023-11-22 ASSESSMENT — ACTIVITIES OF DAILY LIVING (ADL)
ADLS_ACUITY_SCORE: 35
ADLS_ACUITY_SCORE: 35
DEPENDENT_IADLS:: CLEANING;COOKING;LAUNDRY;SHOPPING;MEAL PREPARATION;TRANSPORTATION
ADLS_ACUITY_SCORE: 35

## 2023-11-22 NOTE — ED PROVIDER NOTES
EMERGENCY DEPARTMENT ENCOUNTER      NAME: Jessica Cruz  AGE: 83 year old female  YOB: 1940  MRN: [id number]  EVALUATION DATE & TIME: 11/21/2023 10:10 PM    PCP: No Ref-Primary, Physician    ED PROVIDER: Kenan Byrd M.D.    Chief Complaint   Patient presents with    Hemoptysis    Hematuria       FINAL IMPRESSION:  1. Arthralgia, unspecified joint    2. Fever, unspecified fever cause    3. Gross hematuria    4. Subacute cough        ED COURSE & MEDICAL DECISION MAKING:    Pertinent Labs & Imaging studies independently interpreted by me. (See chart for details)  10:32 PM Patient seen and examined, reviewed prior urgent care visit from October 21 when patient was seen with bronchitis, bilateral otitis media and was treated with albuterol, Omnicef.  Subsequently followed up in the emergency department on November 13 with cough, no fever at that time, labs were reassuring and CT scan demonstrated infectious versus inflammatory nodules, patient was started on azithromycin and Tessalon Perles.  Returns today with continued joint pain and swelling, cough, hoarse voice, and intermittent pink urine.  On exam here, febrile and tachycardic with swelling of the MCP, DIP, and PIP joints of the hands bilaterally with no redness or warmth of these joints, trace wheezing on exam, no abdominal tenderness, trace lower extremity edema.  Labs are ordered along with chest x-ray, consider further imaging based on initial results.  12:19 AM labs independently interpreted by me with leukocytosis, mild anemia, mild increase in the platelet count which may be an acute phase reactant.  Basic panel with mild hyponatremia otherwise reassuring.  CRP is significantly elevated, lactate normal and procalcitonin negative.  Patient presents with diffuse body aches and joint pains going on for the last 5 weeks or so after having a COVID, this could be a COVID related arthritis but cannot exclude other cause and concern in setting  of fever.  Urinalysis with hematuria, possible signs of infection.  Recent CT scan with inflammatory nodules.  Will repeat CT scan chest, abdomen, pelvis given leukocytosis and fever and plan to admit.  1:50 AM CT scan chest, abdomen, pelvis independently interpreted by me negative for acute findings.  Patient will be given Rocephin for possible urinary infection and admitted for further evaluation and treatment given fever, polyarthralgia, elevated CRP.  2:20 AM  Care discussed with Dr. Jacobson, hospitalist for admission.     At the conclusion of the encounter I discussed the results of all of the tests and the disposition. The questions were answered. The patient or family acknowledged understanding and was agreeable with the care plan.     Medical Decision Making    History:  Supplemental history from: Documented in chart, if applicable  External Record(s) reviewed: Documented in chart, if applicable.    Work Up:  Chart documentation includes differential considered and any EKGs or imaging independently interpreted by provider, where specified.  In additional to work up documented, I considered the following work up: Documented in chart, if applicable.    External consultation:  Discussion of management with another provider: Documented in chart, if applicable    Complicating factors:  Care impacted by chronic illness: N/A  Care affected by social determinants of health: N/A    Disposition considerations: Admit.    MEDICATIONS GIVEN IN THE EMERGENCY:  Medications   cefTRIAXone (ROCEPHIN) 1 g vial to attach to  mL bag for ADULTS or NS 50 mL bag for PEDS (1 g Intravenous $New Bag 11/22/23 0213)   iopamidol (ISOVUE-370) solution 75 mL (75 mLs Intravenous $Given 11/22/23 0110)   ketorolac (TORADOL) injection 15 mg (15 mg Intravenous $Given 11/22/23 0213)       NEW PRESCRIPTIONS STARTED AT TODAY'S ER VISIT  New Prescriptions    No medications on file        =================================================================    HPI    Patient information was obtained from: patient and spouse      Jessica Cruz is a 83 year old female with a pertinent history of coronary artery disease who presents to this ED for evaluation of myalgias, arthralgias, cough, and pink urine.  Patient notes she has had joint pain and body pain ongoing for the last 5 weeks or so since she went on a cruise and returned with COVID.  She has had a cough which occasionally has some blood-streaked sputum.  She coughs more at night.  Also notes that bilateral ears feel plugged.  She has noted occasional pink or brown urine and feels like she has to strain to urinate.  No diarrhea.  No chest pain, no shortness of breath.  Takes ibuprofen twice a day which seems to help with her pain but has not taken Tylenol.      REVIEW OF SYSTEMS   Review of Systems   All other systems reviewed and negative    PAST MEDICAL HISTORY:  Past Medical History:   Diagnosis Date    Aortic regurgitation     Colon cancer (H)     Coronary artery disease     previous PCI    Diverticulosis     History of anesthesia complications     Hyperlipidemia     PONV (postoperative nausea and vomiting)        PAST SURGICAL HISTORY:  Past Surgical History:   Procedure Laterality Date    ANGIOPLASTY      ZZC PARTIAL HIP REPLACEMENT Left 6/2/2020    Procedure: HEMIARTHROPLASTY, HIP, BIPOLAR LEFT;  Surgeon: Jorge Luis Aceves MD;  Location: [facility];  Service: Orthopedics       CURRENT MEDICATIONS:    Current Facility-Administered Medications   Medication    cefTRIAXone (ROCEPHIN) 1 g vial to attach to  mL bag for ADULTS or NS 50 mL bag for PEDS     Current Outpatient Medications   Medication    aspirin 81 mg chewable tablet    atorvastatin (LIPITOR) 10 MG tablet    benzonatate (TESSALON) 100 MG capsule    cholecalciferol, vitamin D3, 1,000 unit tablet    coenzyme Q10 (CO Q-10) 100 mg capsule    HYDROcodone-acetaminophen  "5-325 mg per tablet    isosorbide mononitrate (IMDUR) 30 MG 24 hr tablet    Lactobacillus rhamnosus GG (CULTURELLE) 10-15 Billion cell capsule    levothyroxine (SYNTHROID, LEVOTHROID) 88 MCG tablet    multivitamin therapeutic tablet    nitroglycerin (NITROSTAT) 0.4 MG SL tablet    pantoprazole (PROTONIX) 40 MG tablet    polyethylene glycol (MIRALAX) 17 gram packet    psyllium (METAMUCIL) 3.4 gram packet       ALLERGIES:  Allergies   Allergen Reactions    Ace Inhibitors Other (See Comments)     Angioedema per Dr Сергей Krishnamurthy., Pt developed facial bruising and headache after taking lisinopil., See 6/27/16 telephone encounter, Lisinopril , Angioedema per Dr Сергей Krishnamurthy., Pt developed facial bruising and headache after taking lisinopil., See 6/27/16 telephone encounter, Angioedema per Dr Сергей Krishnamurthy., Pt developed facial bruising and headache after taking lisinopil., See 6/27/16 telephone encounter    Latex Unknown and Rash     \"rash with latex gloves\"    Adhesive Tape-Silicones [Adhesive Tape] Dermatitis     Severe Blisters wherever adhesive touches, Her skin. , Paper tape is OK    Amoxicillin Other (See Comments)     Pt states oral amoxicillin \"doesn't work, the infection keeps coming back\"    Pravastatin Unknown     Leg cramps    Prinivil [Lisinopril] Unknown     bruising    Senna Other (See Comments)     Colon inflamation    Temazepam Other (See Comments)     Balance problem,s    Trazodone Unknown     Tongue swelling; anxiety    Alendronate [Alendronate] Unknown and Other (See Comments)     \"constipated\", \"constipated\"    Flonase [Fluticasone] Rash    Metoprolol Succinate [Metoprolol] Rash    Simvastatin Rash    Sulfa (Sulfonamide Antibiotics) [Sulfa Antibiotics] Rash       FAMILY HISTORY:  No family history on file.    SOCIAL HISTORY:   Social History     Socioeconomic History    Marital status:    Tobacco Use    Smoking status: Former    Smokeless tobacco: Never   Substance and Sexual Activity    Alcohol " use: Not Currently     Alcohol/week: 7.0 standard drinks of alcohol    Drug use: Never       VITALS:  BP (!) 152/67   Pulse 92   Temp (!) 101  F (38.3  C) (Oral)   Resp 18   SpO2 100%     PHYSICAL EXAM:  Physical Exam  Vitals and nursing note reviewed.   Constitutional:       Appearance: Normal appearance.   HENT:      Head: Normocephalic and atraumatic.      Right Ear: External ear normal.      Left Ear: External ear normal.      Nose: Nose normal.      Mouth/Throat:      Mouth: Mucous membranes are moist.   Eyes:      Extraocular Movements: Extraocular movements intact.      Conjunctiva/sclera: Conjunctivae normal.      Pupils: Pupils are equal, round, and reactive to light.   Cardiovascular:      Rate and Rhythm: Normal rate and regular rhythm.   Pulmonary:      Effort: Pulmonary effort is normal.      Breath sounds: Normal breath sounds. No wheezing or rales.   Abdominal:      General: Abdomen is flat. There is no distension.      Palpations: Abdomen is soft.      Tenderness: There is no abdominal tenderness. There is no guarding.   Musculoskeletal:         General: Swelling present. Normal range of motion.      Cervical back: Normal range of motion and neck supple.      Right lower leg: No edema.      Left lower leg: No edema.      Comments: Swelling of the MCP, DIP, PIP joints diffusely as well as both wrists   Lymphadenopathy:      Cervical: No cervical adenopathy.   Skin:     General: Skin is warm and dry.   Neurological:      General: No focal deficit present.      Mental Status: She is alert and oriented to person, place, and time. Mental status is at baseline.      Comments: No gross focal neurologic deficits   Psychiatric:         Mood and Affect: Mood normal.         Behavior: Behavior normal.         Thought Content: Thought content normal.          LAB:  All pertinent labs reviewed and interpreted.  Results for orders placed or performed during the hospital encounter of 11/21/23   Chest XR,  PA &  LAT    Impression    IMPRESSION: Stable enlarged heart. Mild central vascular prominence. Left basilar atelectasis. No other focal pulmonary consolidation, or pleural effusion. Tortuous thoracic aorta with atherosclerotic calcifications of the aortic arch. Redemonstration of   age-indeterminate impacted fracture of the right humeral neck. Multilevel degenerative changes of the thoracic spine. Mild vertebral wedging at several mid thoracic levels, age indeterminate but favoring chronic. No pneumothorax.   CT Chest/Abdomen/Pelvis w Contrast    Impression    IMPRESSION:  1.  Very mild changes of chronic interstitial lung disease.    2.  Severe coronary artery disease.    3.  Moderate amount of colonic stool with large bowel resections as above. No small bowel obstruction or free air.    4.  No CT finding to explain patient's hematuria given compromised visualization of the bladder due to orthopedic hardware artifact.   Basic metabolic panel   Result Value Ref Range    Sodium 127 (L) 135 - 145 mmol/L    Potassium 3.6 3.4 - 5.3 mmol/L    Chloride 92 (L) 98 - 107 mmol/L    Carbon Dioxide (CO2) 24 22 - 29 mmol/L    Anion Gap 11 7 - 15 mmol/L    Urea Nitrogen 8.7 8.0 - 23.0 mg/dL    Creatinine 0.49 (L) 0.51 - 0.95 mg/dL    GFR Estimate >90 >60 mL/min/1.73m2    Calcium 9.5 8.8 - 10.2 mg/dL    Glucose 131 (H) 70 - 99 mg/dL   Lactic acid whole blood   Result Value Ref Range    Lactic Acid 1.7 0.7 - 2.0 mmol/L   Result Value Ref Range    Procalcitonin 0.06 <0.50 ng/mL   Result Value Ref Range    Magnesium 2.0 1.7 - 2.3 mg/dL   Result Value Ref Range    INR 1.21 (H) 0.85 - 1.15   UA with Microscopic reflex to Culture    Specimen: Urine, Clean Catch   Result Value Ref Range    Color Urine Light Yellow Colorless, Straw, Light Yellow, Yellow    Appearance Urine Clear Clear    Glucose Urine Negative Negative mg/dL    Bilirubin Urine Negative Negative    Ketones Urine Negative Negative mg/dL    Specific Gravity Urine 1.008 1.001 -  1.030    Blood Urine 1.0 mg/dL (A) Negative    pH Urine 7.5 (H) 5.0 - 7.0    Protein Albumin Urine Negative Negative mg/dL    Urobilinogen Urine <2.0 <2.0 mg/dL    Nitrite Urine Negative Negative    Leukocyte Esterase Urine 75 Gabi/uL (A) Negative    Bacteria Urine Few (A) None Seen /HPF    RBC Urine 30 (H) <=2 /HPF    WBC Urine 9 (H) <=5 /HPF    Squamous Epithelials Urine <1 <=1 /HPF   CBC with platelets and differential   Result Value Ref Range    WBC Count 13.6 (H) 4.0 - 11.0 10e3/uL    RBC Count 3.42 (L) 3.80 - 5.20 10e6/uL    Hemoglobin 9.6 (L) 11.7 - 15.7 g/dL    Hematocrit 28.1 (L) 35.0 - 47.0 %    MCV 82 78 - 100 fL    MCH 28.1 26.5 - 33.0 pg    MCHC 34.2 31.5 - 36.5 g/dL    RDW 11.8 10.0 - 15.0 %    Platelet Count 466 (H) 150 - 450 10e3/uL    % Neutrophils 75 %    % Lymphocytes 14 %    % Monocytes 10 %    % Eosinophils 1 %    % Basophils 0 %    % Immature Granulocytes 0 %    NRBCs per 100 WBC 0 <1 /100    Absolute Neutrophils 10.1 (H) 1.6 - 8.3 10e3/uL    Absolute Lymphocytes 1.9 0.8 - 5.3 10e3/uL    Absolute Monocytes 1.3 0.0 - 1.3 10e3/uL    Absolute Eosinophils 0.2 0.0 - 0.7 10e3/uL    Absolute Basophils 0.1 0.0 - 0.2 10e3/uL    Absolute Immature Granulocytes 0.1 <=0.4 10e3/uL    Absolute NRBCs 0.0 10e3/uL   Result Value Ref Range    CRP Inflammation 79.20 (H) <5.00 mg/L   Extra Red Top Tube   Result Value Ref Range    Hold Specimen [facility]        RADIOLOGY:  Reviewed all pertinent imaging. Please see official radiology report.  CT Chest/Abdomen/Pelvis w Contrast   Final Result   IMPRESSION:   1.  Very mild changes of chronic interstitial lung disease.      2.  Severe coronary artery disease.      3.  Moderate amount of colonic stool with large bowel resections as above. No small bowel obstruction or free air.      4.  No CT finding to explain patient's hematuria given compromised visualization of the bladder due to orthopedic hardware artifact.      Chest XR,  PA & LAT   Final Result   IMPRESSION: Stable  enlarged heart. Mild central vascular prominence. Left basilar atelectasis. No other focal pulmonary consolidation, or pleural effusion. Tortuous thoracic aorta with atherosclerotic calcifications of the aortic arch. Redemonstration of    age-indeterminate impacted fracture of the right humeral neck. Multilevel degenerative changes of the thoracic spine. Mild vertebral wedging at several mid thoracic levels, age indeterminate but favoring chronic. No pneumothorax.          I, Michelle Briceno, am serving as a scribe to document services personally performed by Dr. Byrd based on my observation and the provider's statements to me. I, Kenan Byrd MD attest that Michelle Briceno is acting in a scribe capacity, has observed my performance of the services and has documented them in accordance with my direction.    Kenan Byrd M.D.  Emergency Medicine  [facility] EMERGENCY DEPARTMENT  [address] [address]  [phone]  Dept: [phone]         Kenan Byrd MD  11/22/23 0222

## 2023-11-22 NOTE — MEDICATION SCRIBE - ADMISSION MEDICATION HISTORY
Medication Scribe Admission Medication History    Admission medication history is complete. The information provided in this note is only as accurate as the sources available at the time of the update.    Information Source(s): Patient via in-person    Pertinent Information: Pt was confused and reported that her MD told her to stop Isosorbide because it was making her body sodium levels too low.  However, after asking about Protonix, pt realized that this was the medication that he primary told her to hold off taking.   is present and seconded the Isosorbide as the medication she should be continuing.  Also, she has been experiencing nausea for the past week which prompted her to stop taking a portion of her medications base on what she believed she needs (needed: Lipitor, Synthroid, Miralax & Metamucil, Probiotic, and Co Q10).      Changes made to PTA medication list:  Added: Omega-3  Deleted: Protonix stopped 4-6 weeks ago  Changed: None    Medication Affordability:  Not including over the counter (OTC) medications, was there a time in the past 3 months when you did not take your medications as prescribed because of cost?: No    Allergies reviewed with patient and updates made in EHR: yes    Medication History Completed By: Chino Morales 11/22/2023 4:06 AM    Prior to Admission medications    Medication Sig Last Dose Taking? Auth Provider Long Term End Date   aspirin 81 mg chewable tablet [ASPIRIN 81 MG CHEWABLE TABLET] Chew 1 tablet (81 mg total) 2 (two) times a day. Past Week at am Yes Marlena Manrique DO     atorvastatin (LIPITOR) 10 MG tablet [ATORVASTATIN (LIPITOR) 10 MG TABLET] Take 10 mg by mouth at bedtime. 11/21/2023 at pm Yes Provider, Historical Yes    benzonatate (TESSALON) 100 MG capsule Take 1 capsule (100 mg) by mouth 3 times daily as needed for cough Past Week at prn Yes Maurisio Thao, DO     cholecalciferol, vitamin D3, 1,000 unit tablet [CHOLECALCIFEROL, VITAMIN D3, 1,000 UNIT TABLET]  Take 2,000 Units by mouth daily. Past Week at am Yes Provider, Historical     coenzyme Q10 (CO Q-10) 100 mg capsule [COENZYME Q10 (CO Q-10) 100 MG CAPSULE] Take 100 mg by mouth daily. 11/21/2023 at am Yes Provider, Historical     fish oil-omega-3 fatty acids 1000 MG capsule Take 2 g by mouth daily Past Week at am Yes Reported, Patient     isosorbide mononitrate (IMDUR) 30 MG 24 hr tablet [ISOSORBIDE MONONITRATE (IMDUR) 30 MG 24 HR TABLET] Take 30 mg by mouth daily. 11/21/2023 at am Yes Provider, Historical Yes    Lactobacillus rhamnosus GG (CULTURELLE) 10-15 Billion cell capsule [LACTOBACILLUS RHAMNOSUS GG (CULTURELLE) 10-15 BILLION CELL CAPSULE] Take 1 capsule by mouth daily. 11/21/2023 at am Yes Provider, Historical     levothyroxine (SYNTHROID, LEVOTHROID) 88 MCG tablet [LEVOTHYROXINE (SYNTHROID, LEVOTHROID) 88 MCG TABLET] Take 88 mcg by mouth daily. 11/21/2023 at am Yes Provider, Historical     multivitamin therapeutic tablet [MULTIVITAMIN THERAPEUTIC TABLET] Take 1 tablet by mouth daily. Past Week at am Yes Provider, Historical     polyethylene glycol (MIRALAX) 17 gram packet [POLYETHYLENE GLYCOL (MIRALAX) 17 GRAM PACKET] Take 1 packet (17 g total) by mouth 2 (two) times a day. 11/21/2023 at am Yes Marlena Manrique, DO     psyllium (METAMUCIL) 3.4 gram packet [PSYLLIUM (METAMUCIL) 3.4 GRAM PACKET] Take 2 packets by mouth daily. 11/21/2023 at am Yes Marlena Manrique, DO     nitroglycerin (NITROSTAT) 0.4 MG SL tablet [NITROGLYCERIN (NITROSTAT) 0.4 MG SL TABLET] Place 0.4 mg under the tongue every 5 (five) minutes as needed for chest pain.  at prn On-Hand  Provider, Historical Yes

## 2023-11-22 NOTE — ED NOTES
[facility] ED Handoff Report    ED Chief Complaint: hemoptysis, hematuria    ED Diagnosis:  (M25.50) Arthralgia, unspecified joint  Comment: UTI- receiving antibiotics  Plan: continue with antibiotics. Waiting on sputum sample.     (R50.9) Fever, unspecified fever cause  Comment: see above  Plan: see above    (R31.0) Gross hematuria  Comment: UTI+  Plan: ABX    (R05.2) Subacute cough  Comment: waiting on sputum sample  Plan: follow up with results of sputum sample       PMH:    Past Medical History:   Diagnosis Date    Aortic regurgitation     Colon cancer (H)     Coronary artery disease     previous PCI    Diverticulosis     History of anesthesia complications     Hyperlipidemia     PONV (postoperative nausea and vomiting)         Code Status:  Full Code     Falls Risk: Yes Band: Applied    Current Living Situation/Residence: lives with a significant other     Elimination Status: Continent: Yes     Activity Level: SBA w/ walker    Patients Preferred Language:  English     Needed: No    Vital Signs:  /55   Pulse 84   Temp 99.1  F (37.3  C)   Resp 18   SpO2 99%      Cardiac Rhythm: SR    Pain Score: 0/10    Is the Patient Confused:  No    Last Food or Drink: 11/22/23 at 1300    Focused Assessment:  Pt denies pain, cp, sob. Waiting on sputum sample     Tests Performed: Done: Labs and Imaging    Treatments Provided:  see mar    Family Dynamics/Concerns: No    Family Updated On Visitor Policy: Yes    Plan of Care Communicated to Family: Yes    Who Was Updated about Plan of Care:     Belongings Checklist Done and Signed by Patient: Yes    Medications sent with patient: n/a    Covid:  not tested    Additional Information: Echo EF 60-65%    RN: Blaire Orlando RN 11/22/2023 2:32 PM

## 2023-11-22 NOTE — H&P
[facility]    History and Physical - Hospitalist Service       Date of Admission:  11/21/2023    Assessment & Plan      Jessica Cruz is a 83 year old female admitted on 11/21/2023. She came to the ED for evaluation of ongoing cough now with blood and hematuria    #Subacute cough with hemoptysis  -CT chest showed very mild changes of chronic interstitial lung disease; severe coronary artery disease  -COVID infection at the end of 9/2023 treated with Paxlovid  -CRP inflammation 79.2  -Check EDYTA, ANCA, rheumatoid factor  -Check sputum culture  -Echocardiogram to evaluate for CHF as possible etiology  -Albuterol nebulizer as needed  -Pulmonary consult    #Edema  -Noted on lower extremity and hands  -N-terminal proBNP 1141  -Lasix 40 mg IV x1 and monitor response  -Lab work-up as above    #Hyponatremia  -Check urine sodium, urine osmolality  -Patient was asked by her primary to stop the medication that was making her sodium low; she denies being on a diuretic    #Sepsis  #Acute cystitis with hematuria  -WBC 13.6  -Procalcitonin not elevated  -Continue IV ceftriaxone  -Follow-up urine culture    #Essential hypertension  -Resume PTA isosorbide mononitrate  -IV hydralazine as needed    #Drug-induced platelet defect  -On PTA aspirin, hold as above    #Thrombocytosis  -Likely acute reaction to illness    #Coagulation defect  -INR 1.21  -Check hepatic panel    #Anemia of chronic disease  -Stable hemoglobin  -Monitor for bleeding    #Coronary artery disease  -Denies angina symptoms  -Hold PTA aspirin secondary to hemoptysis and hematuria  -Continue PTA atorvastatin, isosorbide and as needed nitroglycerin    #Hypothyroidism  -TSH 4.62, free T4 1.59, euthyroid  -Continue PTA levothyroxine    #Low transit constipation  -CT showed moderate amount of colonic stool with large bowel resections; no small bowel obstruction  -Continue PTA MiraLAX and Metamucil  -As needed enema        Diet: NPO for  Medical/Clinical Reasons Except for: Meds    DVT Prophylaxis: Pneumatic Compression Devices  Almaraz Catheter: Not present  Lines: None     Cardiac Monitoring: None  Code Status: Full Code          Disposition Plan      Expected Discharge Date: 11/24/2023                  Fidencio Jacobson MD  Hospitalist Service  [facility]  Securely message with [employer] (more info)  Text page via [facility] Paging/Directory     ______________________________________________________________________    Chief Complaint   Hemoptysis, persistent cough, hematuria    History is obtained from the patient, electronic health record, and emergency department physician    History of Present Illness   Jessica Cruz is a 83 year old female who came to the emergency department for evaluation of above chief complaint.  Past medical history of CAD, stents, colon cancer, resection, hyperlipidemia, essential hypertension, anemia, hypothyroidism, left foot drop.  Patient went on a 1 week cruise with her  out of [place], [place] up to [place] and returned on 9/22/2023.  Her  tested positive for COVID and so did her on 9/26/2023.  Reportedly, she was treated with Paxlovid and her symptoms were mild.  However, after 2 weeks she had persistent cough with clear phlegm treated with doxycycline without relief.  She was seen at the urgency room on 10/21/2023 where she reported ear fullness and chest tightness.  She was treated with Decadron 10 mg x 1 and prescribed albuterol HFA and cefdinir for bilateral otitis media and bronchitis.  However, her symptoms persisted and was evaluated in the ED on 11/13/2023.  D-dimer was elevated and CT chest showed a small lingular and left lower lobe nodules which are likely inflammatory in nature, recommended 3 months follow-up.  Her cough improved with Tessalon and DuoNeb so she was prescribed Z-Michael and Tessalon although she declined a prescription for prednisone.  Patient comes in with  ongoing cough, lately has noted streaks of blood and blood clots.  In addition, she complains of ongoing chills and new hematuria without dysuria or urinary frequency.  She has noted some edema of her lower extremities and joints on bilateral hands.      Past Medical History    Past Medical History:   Diagnosis Date    Aortic regurgitation     Colon cancer (H)     Coronary artery disease     previous PCI    Diverticulosis     History of anesthesia complications     Hyperlipidemia     PONV (postoperative nausea and vomiting)        Past Surgical History   Past Surgical History:   Procedure Laterality Date    ANGIOPLASTY      ZZC PARTIAL HIP REPLACEMENT Left 6/2/2020    Procedure: HEMIARTHROPLASTY, HIP, BIPOLAR LEFT;  Surgeon: Jorge Luis Aceves MD;  Location: [facility];  Service: Orthopedics       Prior to Admission Medications   Prior to Admission Medications   Prescriptions Last Dose Informant Patient Reported? Taking?   Lactobacillus rhamnosus GG (CULTURELLE) 10-15 Billion cell capsule 11/21/2023 at am Self, Spouse/Significant Other Yes Yes   Sig: [LACTOBACILLUS RHAMNOSUS GG (CULTURELLE) 10-15 BILLION CELL CAPSULE] Take 1 capsule by mouth daily.   aspirin 81 mg chewable tablet Past Week at am Self, Spouse/Significant Other No Yes   Sig: [ASPIRIN 81 MG CHEWABLE TABLET] Chew 1 tablet (81 mg total) 2 (two) times a day.   atorvastatin (LIPITOR) 10 MG tablet 11/21/2023 at pm Self, Spouse/Significant Other Yes Yes   Sig: [ATORVASTATIN (LIPITOR) 10 MG TABLET] Take 10 mg by mouth at bedtime.   benzonatate (TESSALON) 100 MG capsule Past Week at prn Self, Spouse/Significant Other No Yes   Sig: Take 1 capsule (100 mg) by mouth 3 times daily as needed for cough   cholecalciferol, vitamin D3, 1,000 unit tablet Past Week at am Self, Spouse/Significant Other Yes Yes   Sig: [CHOLECALCIFEROL, VITAMIN D3, 1,000 UNIT TABLET] Take 2,000 Units by mouth daily.   coenzyme Q10 (CO Q-10) 100 mg capsule 11/21/2023 at am Self,  Spouse/Significant Other Yes Yes   Sig: [COENZYME Q10 (CO Q-10) 100 MG CAPSULE] Take 100 mg by mouth daily.   fish oil-omega-3 fatty acids 1000 MG capsule Past Week at am Self, Spouse/Significant Other Yes Yes   Sig: Take 2 g by mouth daily   isosorbide mononitrate (IMDUR) 30 MG 24 hr tablet 11/21/2023 at am Self, Spouse/Significant Other Yes Yes   Sig: [ISOSORBIDE MONONITRATE (IMDUR) 30 MG 24 HR TABLET] Take 30 mg by mouth daily.   levothyroxine (SYNTHROID, LEVOTHROID) 88 MCG tablet 11/21/2023 at am Self, Spouse/Significant Other Yes Yes   Sig: [LEVOTHYROXINE (SYNTHROID, LEVOTHROID) 88 MCG TABLET] Take 88 mcg by mouth daily.   multivitamin therapeutic tablet Past Week at am Self, Spouse/Significant Other Yes Yes   Sig: [MULTIVITAMIN THERAPEUTIC TABLET] Take 1 tablet by mouth daily.   nitroglycerin (NITROSTAT) 0.4 MG SL tablet  at prn On-Hand Self, Spouse/Significant Other Yes No   Sig: [NITROGLYCERIN (NITROSTAT) 0.4 MG SL TABLET] Place 0.4 mg under the tongue every 5 (five) minutes as needed for chest pain.   polyethylene glycol (MIRALAX) 17 gram packet 11/21/2023 at am Self, Spouse/Significant Other No Yes   Sig: [POLYETHYLENE GLYCOL (MIRALAX) 17 GRAM PACKET] Take 1 packet (17 g total) by mouth 2 (two) times a day.   psyllium (METAMUCIL) 3.4 gram packet 11/21/2023 at am Self, Spouse/Significant Other No Yes   Sig: [PSYLLIUM (METAMUCIL) 3.4 GRAM PACKET] Take 2 packets by mouth daily.      Facility-Administered Medications: None           Physical Exam   Vital Signs: Temp: (!) 101  F (38.3  C) Temp src: Oral BP: (!) 165/73 Pulse: 83   Resp: 18 SpO2: 97 % O2 Device: None (Room air)    Weight: 0 lbs 0 oz    Constitutional: awake and alert  Respiratory: no increased work of breathing, good air exchange, and diminished breath sounds right base and left base  Cardiovascular: regular rate and rhythm, normal S1 and S2, and murmurs include systolic murmur III/VI located at right upper sternal border with radiation to the  carotids  GI: normal bowel sounds  Skin: no bruising or bleeding, warm to the touch  Musculoskeletal: 1+ pitting edema of lower extremities, bilateral hand edema especially PIP and DIP of second and third digits on the right hand and second, third and fourth digits of the left hand  Neurologic: Mental Status Exam:  Orientation:   person, place, time  Motor Exam:  moves all extremities well and symmetrically    Medical Decision Making       60 MINUTES SPENT BY ME on the date of service doing chart review, history, exam, documentation & further activities per the note.  MANAGEMENT DISCUSSED with the following over the past 24 hours: Patient and        Data     I have personally reviewed the following data over the past 24 hrs:    13.6 (H)  \   9.6 (L)   / 466 (H)     127 (L) 92 (L) 8.7 /  131 (H)   3.6 24 0.49 (L) \     Trop: N/A BNP: 1,141     TSH: 4.62 (H) T4: 1.59 A1C: N/A     Procal: 0.06 CRP: 79.20 (H) Lactic Acid: 1.7       INR:  1.21 (H) PTT:  N/A   D-dimer:  N/A Fibrinogen:  N/A       Imaging results reviewed over the past 24 hrs:   Recent Results (from the past 24 hour(s))   Chest XR,  PA & LAT    Narrative    EXAM: XR CHEST 2 VIEWS  LOCATION: [facility]  DATE: 11/21/2023    INDICATION: dyspnea  COMPARISON: CT PE chest 11/13/2023, 2 view chest radiograph 10/21/2023      Impression    IMPRESSION: Stable enlarged heart. Mild central vascular prominence. Left basilar atelectasis. No other focal pulmonary consolidation, or pleural effusion. Tortuous thoracic aorta with atherosclerotic calcifications of the aortic arch. Redemonstration of   age-indeterminate impacted fracture of the right humeral neck. Multilevel degenerative changes of the thoracic spine. Mild vertebral wedging at several mid thoracic levels, age indeterminate but favoring chronic. No pneumothorax.   CT Chest/Abdomen/Pelvis w Contrast    Narrative    EXAM: CT CHEST/ABDOMEN/PELVIS W CONTRAST  LOC[facility]: [facility]  DATE: 11/22/2023    INDICATION: fever, hematuria  COMPARISON: 11/21/2023, 11/13/2023.  TECHNIQUE: CT scan of the chest, abdomen, and pelvis was performed following injection of IV contrast. Multiplanar reformats were obtained. Dose reduction techniques were used.   CONTRAST: 75ml Isovue 370    FINDINGS:   LUNGS AND PLEURA: Mild patchy foci of peripheral interstitial reticulation in the upper lung zones. No honeycombing. No acute airspace consolidation. 5 mm left lower lobe nodule image 212 of series 9 and stable over short interval. No pleural effusion.    MEDIASTINUM/AXILLAE: Heart size within normal limits. Multivessel coronary artery disease. No proximal pulmonary embolism. Atherosclerotic thoracic aorta.    CORONARY ARTERY CALCIFICATION: Severe.    HEPATOBILIARY: Normal.    PANCREAS: Normal.    SPLEEN: Normal.    ADRENAL GLANDS: Normal.    KIDNEYS/BLADDER: Normal kidneys. Nondistended bladder, though detail is compromised by beam hardening artifact from left hip arthroplasty.    BOWEL: Ileocolic and colorectal anastomotic sutures. No small bowel dilatation. Moderate amount of colonic stool. No free air.    LYMPH NODES: Normal.    VASCULATURE: Severe aortoiliac atherosclerotic calcification without aneurysm.    PELVIC ORGANS: Uterus is atrophic or absent.    MUSCULOSKELETAL: Osteopenia with diffuse thoracolumbar spine degenerative disc and facet changes. Left hip arthroplasty.      Impression    IMPRESSION:  1.  Very mild changes of chronic interstitial lung disease.    2.  Severe coronary artery disease.    3.  Moderate amount of colonic stool with large bowel resections as above. No small bowel obstruction or free air.    4.  No CT finding to explain patient's hematuria given compromised visualization of the bladder due to orthopedic hardware artifact.

## 2023-11-22 NOTE — ED NOTES
Potassium redrawn and sent. EKG completed. MD updated. P2 RN notified to follow up with new potassium level and replacements.

## 2023-11-22 NOTE — ED TRIAGE NOTES
Jessica presents to triage with . Was seen at this ED on 11/13 for similar concerns. Today she explains that she has been coughing up pink sputum or sputum with blood clots and that she has been urinating pine tinged urine. Fever of 101.2 in triage and tachycardia in triage. Reports increased swelling to bilateral lower extremities. She reports about 7 pound weight loss.     Triage Assessment (Adult)       Row Name 11/21/23 2200          Triage Assessment    Airway WDL WDL        Respiratory WDL    Respiratory WDL X;cough     Cough Frequency frequent     Cough Type productive        Skin Circulation/Temperature WDL    Skin Circulation/Temperature WDL X;temperature     Skin Temperature warm        Cardiac WDL    Cardiac WDL X;rhythm     Pulse Rate & Regularity tachycardic        Peripheral/Neurovascular WDL    Peripheral Neurovascular WDL WDL        Cognitive/Neuro/Behavioral WDL    Cognitive/Neuro/Behavioral WDL WDL

## 2023-11-22 NOTE — CONSULTS
Care Management Initial Consult    General Information  Assessment completed with: Patient,    Type of CM/SW Visit: Initial Assessment    Primary Care Provider verified and updated as needed: Yes   Readmission within the last 30 days: no previous admission in last 30 days         Advance Care Planning:            Communication Assessment  Patient's communication style: spoken language (English or Bilingual)             Cognitive  Cognitive/Neuro/Behavioral: WDL                      Living Environment:   People in home: spouse     Current living Arrangements: house      Able to return to prior arrangements: yes       Family/Social Support:  Care provided by: self, spouse/significant other  Provides care for: no one  Marital Status:   Children,           Description of Support System: Supportive, Involved         Current Resources:   Patient receiving home care services: No     Community Resources: None  Equipment currently used at home:    Supplies currently used at home: None         Does the patient's insurance plan have a 3 day qualifying hospital stay waiver?  yes    Lifestyle & Psychosocial Needs:  Social Determinants of Health     Food Insecurity: Not on file   Depression: Not on file   Housing Stability: Not on file   Tobacco Use: Medium Risk (7/1/2021)    Patient History     Smoking Tobacco Use: Former     Smokeless Tobacco Use: Never     Passive Exposure: Not on file   Financial Resource Strain: Not on file   Alcohol Use: Not on file   Transportation Needs: Not on file   Physical Activity: Not on file   Interpersonal Safety: Not on file   Stress: Not on file   Social Connections: Not on file       Functional Status:  Prior to admission patient needed assistance:   Dependent ADLs:: Ambulation-walker  Dependent IADLs:: Cleaning, Cooking, Laundry, Shopping, Meal Preparation, Transportation           Additional Information:    Assessment completed with patient. Patient reports she lives with her  spouse in their town house. She is independent with ADLs and requires assist with IADLs and ambulates with walker. Currently no services in the home.  Spouse is primary family contact and willing to transport at discharge.       Zuleyka Doyle RN

## 2023-11-23 LAB
ANION GAP SERPL CALCULATED.3IONS-SCNC: 8 MMOL/L (ref 7–15)
BACTERIA UR CULT: NORMAL
BUN SERPL-MCNC: 6.1 MG/DL (ref 8–23)
CALCIUM SERPL-MCNC: 8.6 MG/DL (ref 8.8–10.2)
CHLORIDE SERPL-SCNC: 89 MMOL/L (ref 98–107)
CORTIS SERPL-MCNC: 23.1 UG/DL
CREAT SERPL-MCNC: 0.48 MG/DL (ref 0.51–0.95)
DEPRECATED HCO3 PLAS-SCNC: 29 MMOL/L (ref 22–29)
EGFRCR SERPLBLD CKD-EPI 2021: >90 ML/MIN/1.73M2
ERYTHROCYTE [DISTWIDTH] IN BLOOD BY AUTOMATED COUNT: 11.8 % (ref 10–15)
GLUCOSE SERPL-MCNC: 153 MG/DL (ref 70–99)
HCT VFR BLD AUTO: 27.5 % (ref 35–47)
HGB BLD-MCNC: 9.4 G/DL (ref 11.7–15.7)
LACTATE SERPL-SCNC: 1.2 MMOL/L (ref 0.7–2)
MAGNESIUM SERPL-MCNC: 1.9 MG/DL (ref 1.7–2.3)
MCH RBC QN AUTO: 28.3 PG (ref 26.5–33)
MCHC RBC AUTO-ENTMCNC: 34.2 G/DL (ref 31.5–36.5)
MCV RBC AUTO: 83 FL (ref 78–100)
PLATELET # BLD AUTO: 427 10E3/UL (ref 150–450)
POTASSIUM SERPL-SCNC: 2.7 MMOL/L (ref 3.4–5.3)
POTASSIUM SERPL-SCNC: 3.6 MMOL/L (ref 3.4–5.3)
RBC # BLD AUTO: 3.32 10E6/UL (ref 3.8–5.2)
SODIUM SERPL-SCNC: 126 MMOL/L (ref 135–145)
WBC # BLD AUTO: 13.6 10E3/UL (ref 4–11)

## 2023-11-23 PROCEDURE — 250N000013 HC RX MED GY IP 250 OP 250 PS 637: Performed by: STUDENT IN AN ORGANIZED HEALTH CARE EDUCATION/TRAINING PROGRAM

## 2023-11-23 PROCEDURE — 83930 ASSAY OF BLOOD OSMOLALITY: CPT | Performed by: STUDENT IN AN ORGANIZED HEALTH CARE EDUCATION/TRAINING PROGRAM

## 2023-11-23 PROCEDURE — 83605 ASSAY OF LACTIC ACID: CPT | Performed by: STUDENT IN AN ORGANIZED HEALTH CARE EDUCATION/TRAINING PROGRAM

## 2023-11-23 PROCEDURE — 83735 ASSAY OF MAGNESIUM: CPT | Performed by: STUDENT IN AN ORGANIZED HEALTH CARE EDUCATION/TRAINING PROGRAM

## 2023-11-23 PROCEDURE — 84132 ASSAY OF SERUM POTASSIUM: CPT | Performed by: INTERNAL MEDICINE

## 2023-11-23 PROCEDURE — 85027 COMPLETE CBC AUTOMATED: CPT | Performed by: STUDENT IN AN ORGANIZED HEALTH CARE EDUCATION/TRAINING PROGRAM

## 2023-11-23 PROCEDURE — 250N000013 HC RX MED GY IP 250 OP 250 PS 637: Performed by: HOSPITALIST

## 2023-11-23 PROCEDURE — 82533 TOTAL CORTISOL: CPT | Performed by: STUDENT IN AN ORGANIZED HEALTH CARE EDUCATION/TRAINING PROGRAM

## 2023-11-23 PROCEDURE — 120N000001 HC R&B MED SURG/OB

## 2023-11-23 PROCEDURE — 99232 SBSQ HOSP IP/OBS MODERATE 35: CPT | Performed by: STUDENT IN AN ORGANIZED HEALTH CARE EDUCATION/TRAINING PROGRAM

## 2023-11-23 PROCEDURE — 80048 BASIC METABOLIC PNL TOTAL CA: CPT | Performed by: STUDENT IN AN ORGANIZED HEALTH CARE EDUCATION/TRAINING PROGRAM

## 2023-11-23 PROCEDURE — 36415 COLL VENOUS BLD VENIPUNCTURE: CPT | Performed by: STUDENT IN AN ORGANIZED HEALTH CARE EDUCATION/TRAINING PROGRAM

## 2023-11-23 PROCEDURE — 250N000011 HC RX IP 250 OP 636: Mod: JZ | Performed by: HOSPITALIST

## 2023-11-23 PROCEDURE — 250N000013 HC RX MED GY IP 250 OP 250 PS 637: Performed by: INTERNAL MEDICINE

## 2023-11-23 PROCEDURE — 86481 TB AG RESPONSE T-CELL SUSP: CPT | Performed by: STUDENT IN AN ORGANIZED HEALTH CARE EDUCATION/TRAINING PROGRAM

## 2023-11-23 RX ORDER — POTASSIUM CHLORIDE 1500 MG/1
40 TABLET, EXTENDED RELEASE ORAL ONCE
Status: COMPLETED | OUTPATIENT
Start: 2023-11-23 | End: 2023-11-23

## 2023-11-23 RX ORDER — POTASSIUM CHLORIDE 1500 MG/1
20 TABLET, EXTENDED RELEASE ORAL ONCE
Status: COMPLETED | OUTPATIENT
Start: 2023-11-23 | End: 2023-11-23

## 2023-11-23 RX ORDER — FUROSEMIDE 20 MG
20 TABLET ORAL ONCE
Status: COMPLETED | OUTPATIENT
Start: 2023-11-23 | End: 2023-11-23

## 2023-11-23 RX ORDER — SODIUM CHLORIDE 1 G/1
1 TABLET ORAL 2 TIMES DAILY WITH MEALS
Status: DISCONTINUED | OUTPATIENT
Start: 2023-11-23 | End: 2023-11-24

## 2023-11-23 RX ORDER — ASPIRIN 81 MG/1
81 TABLET, CHEWABLE ORAL DAILY
Status: DISCONTINUED | OUTPATIENT
Start: 2023-11-23 | End: 2023-11-24 | Stop reason: HOSPADM

## 2023-11-23 RX ORDER — SODIUM CHLORIDE 9 MG/ML
INJECTION, SOLUTION INTRAVENOUS CONTINUOUS
Status: DISCONTINUED | OUTPATIENT
Start: 2023-11-23 | End: 2023-11-23

## 2023-11-23 RX ADMIN — ACETAMINOPHEN 650 MG: 325 TABLET ORAL at 05:18

## 2023-11-23 RX ADMIN — ASPIRIN 81 MG CHEWABLE TABLET 81 MG: 81 TABLET CHEWABLE at 21:33

## 2023-11-23 RX ADMIN — PSYLLIUM HUSK 2 PACKET: 3.4 POWDER ORAL at 08:54

## 2023-11-23 RX ADMIN — ACETAMINOPHEN 650 MG: 325 TABLET ORAL at 21:38

## 2023-11-23 RX ADMIN — ATORVASTATIN CALCIUM 10 MG: 10 TABLET, FILM COATED ORAL at 21:33

## 2023-11-23 RX ADMIN — SODIUM CHLORIDE TAB 1 GM 1 G: 1 TAB at 16:51

## 2023-11-23 RX ADMIN — ISOSORBIDE MONONITRATE 30 MG: 30 TABLET, EXTENDED RELEASE ORAL at 08:58

## 2023-11-23 RX ADMIN — POTASSIUM CHLORIDE 20 MEQ: 1500 TABLET, EXTENDED RELEASE ORAL at 19:03

## 2023-11-23 RX ADMIN — POTASSIUM CHLORIDE 40 MEQ: 1500 TABLET, EXTENDED RELEASE ORAL at 10:46

## 2023-11-23 RX ADMIN — POTASSIUM CHLORIDE 20 MEQ: 1500 TABLET, EXTENDED RELEASE ORAL at 13:28

## 2023-11-23 RX ADMIN — LEVOTHYROXINE SODIUM 88 MCG: 88 TABLET ORAL at 08:58

## 2023-11-23 RX ADMIN — POLYETHYLENE GLYCOL 3350 17 G: 17 POWDER, FOR SOLUTION ORAL at 08:54

## 2023-11-23 RX ADMIN — FUROSEMIDE 20 MG: 20 TABLET ORAL at 16:51

## 2023-11-23 RX ADMIN — CEFTRIAXONE SODIUM 1 G: 1 INJECTION, POWDER, FOR SOLUTION INTRAMUSCULAR; INTRAVENOUS at 21:33

## 2023-11-23 RX ADMIN — BENZONATATE 100 MG: 100 CAPSULE ORAL at 13:33

## 2023-11-23 RX ADMIN — ACETAMINOPHEN 650 MG: 325 TABLET ORAL at 13:27

## 2023-11-23 ASSESSMENT — ACTIVITIES OF DAILY LIVING (ADL)
ADLS_ACUITY_SCORE: 35
ADLS_ACUITY_SCORE: 29
ADLS_ACUITY_SCORE: 35
ADLS_ACUITY_SCORE: 27
ADLS_ACUITY_SCORE: 29
ADLS_ACUITY_SCORE: 27
ADLS_ACUITY_SCORE: 29
ADLS_ACUITY_SCORE: 35
ADLS_ACUITY_SCORE: 29
ADLS_ACUITY_SCORE: 28
ADLS_ACUITY_SCORE: 35
ADLS_ACUITY_SCORE: 35

## 2023-11-23 NOTE — PROGRESS NOTES
[facility]    Medicine Progress Note - Hospitalist Service    Date of Admission:  11/21/2023    Assessment & Plan   Jessica Cruz is a 83 year old female admitted on 11/21/2023. She came to the ED for evaluation of ongoing cough now with blood and hematuria     #Subacute cough with hemoptysis  #Acute bronchitis  #Lung nodules  -CT chest showed very mild changes of chronic interstitial lung disease; severe coronary artery disease  -COVID infection at the end of 9/2023 treated with Paxlovid  -CRP inflammation 79.2, RF negative  -pending EDYTA, ANCA  -Albuterol nebulizer as needed  -Supportive treatment, Hb stable  -s/p Pulm eval -follow-up chest CT scan in 6 to 9 months  -Quantiferon as patient has night sweats, denies weight loss, fever, exposure    # Arthralgia, Joint swelling and tenderness -OA versus rheumatological  multiple joint swelling and tenderness mainly in the PIP, DIP, better today  RF negative  Pending EDYTA, ANCA  Follow up in Rheumatology OP     # Lower extremity edema  -Noted on lower extremity and hands - improved  -N-terminal proBNP 1141  -Echo EF 60-65% with no wall motion abnormalities  Improved with Lasix, will try another dose today  Will monitor    #Hypokalemia  Magnesium - normal  Monitor and replete as needed  No evidence of GI losses, received 1 dose of diuretic in ER  Held Metamucil, MiraLAX  Monitor potassium     #Hyponatremia  -Patient was asked by her primary to stop the medication that was making her sodium low; she denies being on a diuretic, states it PPI  Na 126, TSH ~ 4  No changes in mental status, seems to be more chronic  Pending cortisol level, serum osmolality  Will start salt tabs, fluid restriction  Monitor Na     #Sepsis  #Acute cystitis with hematuria  -WBC uptrending, was febrile this morning 11/23  -Procalcitonin not elevated  -Continue IV ceftriaxone  -urine culture: mixture of urogenital jania     #Essential hypertension  -Resume PTA isosorbide  mononitrate  -IV hydralazine as needed     #Drug-induced platelet defect  -On PTA aspirin     #Thrombocytosis  -Likely acute reaction to illness     #Coagulation defect  -INR 1.21  -hepatic panel normal     #Anemia of chronic disease  -Stable hemoglobin  -Monitor for bleeding     #Coronary artery disease  -Denies angina symptoms, Trop ~ flat  -Continue PTA asa, atorvastatin, isosorbide and as needed nitroglycerin     #Hypothyroidism  -TSH 4.62, free T4 1.59, euthyroid  -Continue PTA levothyroxine     #Low transit constipation  -CT showed moderate amount of colonic stool with large bowel resections; no small bowel obstruction  -Patient had BM, no diarrhea  -Held PTA MiraLAX and Metamucil due to hypokalemia  -As needed enema    # Hx Tubulovillous adenoma s/p right hemicolectomy    # PT eval for weakness    Diet: Low Saturated Fat Na <2400 mg    DVT Prophylaxis: Pneumatic Compression Devices  Almaraz Catheter: Not present  Lines: None     Cardiac Monitoring: None  Code Status: Full Code      Clinically Significant Risk Factors        # Hypokalemia: Lowest K = 2.7 mmol/L in last 2 days, will replace as needed  # Hyponatremia: Lowest Na = 126 mmol/L in last 2 days, will monitor as appropriate      # Hypoalbuminemia: Lowest albumin = 3.3 g/dL at 11/22/2023  7:48 AM, will monitor as appropriate  # Coagulation Defect: INR = 1.21 (Ref range: 0.85 - 1.15) and/or PTT = 34 Seconds (Ref range: 22 - 38 Seconds), will monitor for bleeding                      Disposition Plan      Expected Discharge Date: 11/24/2023                    Jen Orellana MD  Hospitalist Service  [facility]  Securely message with [employer] (more info)  Text page via [facility] Paging/Directory   ______________________________________________________________________    Interval History   Patient was examined at the bedside, with son and  present.  Clinically looks much better today, states she feels better and that she does not  have any more hemoptysis.  Denies hematuria.  Also states that her pain in her joints is significantly better, complaining of night sweats -will do QuantiFERON.  Discussed about hypokalemia, hyponatremia and further workup.  Answered all questions and if patient is stable we will plan for potential discharge tomorrow    Physical Exam   Vital Signs: Temp: 98.3  F (36.8  C) Temp src: Oral BP: 130/61 Pulse: 79   Resp: 20 SpO2: 95 % O2 Device: None (Room air)    Weight: 127 lbs 8 oz    Constitutional: awake and alert  Respiratory: no increased work of breathing, good air exchange, and diminished breath sounds right base and left base  Cardiovascular: regular rate and rhythm, normal S1 and S2  GI: normal bowel sounds  Skin: no bruising or bleeding, warm to the touch  Musculoskeletal: 1+ pitting edema of lower extremities, bilateral hand edema especially PIP and DIP of second and third digits on the right hand and second, third and fourth digits of the left hand  Neurologic: Mental Status Exam:  Orientation:  person, place, time  Motor Exam:  moves all extremities well and symmetrically    Medical Decision Making       45 MINUTES SPENT BY ME on the date of service doing chart review, history, exam, documentation & further activities per the note.      Data     I have personally reviewed the following data over the past 24 hrs:    13.6 (H)  \   9.4 (L)   / 427     126 (L) 89 (L) 6.1 (L) /  153 (H)   2.7 (L) 29 0.48 (L) \     Trop: 22 (H) BNP: N/A     Procal: N/A CRP: N/A Lactic Acid: 1.2         Imaging results reviewed over the past 24 hrs:   No results found for this or any previous visit (from the past 24 hour(s)).

## 2023-11-23 NOTE — PLAN OF CARE
Goal Outcome Evaluation:       Pt is A/O X4, a little hard of hearing, denied pain, vital have been stable, some cough with prn cough medication given upon pt's request, no bloody stools or urine noted, no N/V, on K protocol.

## 2023-11-23 NOTE — PLAN OF CARE
[id number]  Problem: Fever  Goal: Body Temperature in Desired Range  Outcome: Progressing  Patient afebrile Temp 99.5 recheck 98.3.  Diaphoretic this morning bed all linen wet .   Complete bed changed and patient had a shower.  Requested tylenol for generalized pain helpful per Patient.  K protocol 2.7 received replacement recheck at 1730.  1730: K 3.6  k supplement given recheck in the morning.   Fluid Restriction 1500 24hrs.  Alert and oriented. Ambulating on the hallways with a walker.     Problem: Anemia  Goal: Anemia Symptom Improvement  Intervention: Monitor and Manage Anemia  Recent Flowsheet Documentation  Taken 11/23/2023 0815 by Chayo Hernandez, RN  Safety Promotion/Fall Prevention:   assistive device/personal items within reach   room near nurse's station   room door open   supervised activity   toileting scheduled   Goal Outcome Evaluation:  Hgb 9.4 today.

## 2023-11-23 NOTE — PLAN OF CARE
Goal Outcome Evaluation:    Low grade fever 100.9, earache and sore throat, PRN tylenol given, Pt appears asleep after intervention.  Up to bathroom independently using walker, voiding appropriately.  Denies SOB, weakness or cough.  A&O, able to make needs known.

## 2023-11-23 NOTE — CONSULTS
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   Brought to ED for evaluation of myalgias, arthralgias, weakness, mild cough with occasional streak of blood   Patient returned from cruise trip 5 weeks ago, and got COVID19 viral infection   Upon ED evaluation, patient was febrile, hemodynamically stable, adequate oxygenation.  Labs showed hyponatremia, normal BUN/Cr, elevated WBC, left shift, high CRP, (+) U/A  CT scan showed mild increase peripheral interstitial markings, small lung nodules.   Patient was admitted with diagnosis of UTI. Pulmonary consulted for further evaluation.     Hemoptysis   Described as rare/occasional trace of blood in sputum after diagnosed of COVID19 viral infection 5 weeks ago  Admission chest CT scan showed no pulmonary embolism, very mild peripheral Insterstitial changes.   No clinical suggestion for underlying vasculitis.   Continue treatment of acute bronchitis.   UTI  Follow up urine culture, continue Abx  Mild peripheral interstitial changes and small lung nodules   Diagnosed with COVID19 viral infection 5 weeks ago  No significant respiratory symptoms.   Lung nodules could be inflammatory in nature  Follow up chest CT scan in 6 to 9 months    HTN, CAD  Hx tubulovillious adenoma s/p right hemicolectomy     Advance Directives:  Full code    Plan:   Monitor hemoptysis  Continue ceftriaxone and narrow to oral Abx to complete 7 days   Follow up chest CT scan in 6 to 9 months     Pulmonary service will sign off  Please contact me if you have any questions.    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   Brought to ED for evaluation of myalgias, arthralgias, weakness, mild cough with occasional streak of blood   Patient returned from cruise trip 5 weeks ago, and got COVID19 viral infection   Upon ED evaluation, patient was febrile, hemodynamically stable, adequate oxygenation.  Labs showed hyponatremia, normal BUN/Cr, elevated WBC, left shift, high CRP, (+) U/A  CT scan showed mild increase peripheral interstitial markings, small lung nodules.   Patient was admitted with diagnosis of UTI. Pulmonary consulted for further evaluation.     Past Medical History:   Diagnosis Date    Aortic regurgitation     Colon cancer (H)     Coronary artery disease     previous PCI    Diverticulosis     History of anesthesia complications     Hyperlipidemia     PONV (postoperative nausea and vomiting)      Allergies: Ace inhibitors, Latex, Adhesive tape-silicones [adhesive tape], Amoxicillin, Pravastatin, Prinivil [lisinopril], Senna, Temazepam, Trazodone, Alendronate [alendronate], Flonase [fluticasone], Metoprolol succinate [metoprolol], Simvastatin, and Sulfa (sulfonamide antibiotics) [sulfa antibiotics]     MEDS:  Current Facility-Administered Medications   Medication    acetaminophen (TYLENOL) tablet 650 mg    Or    acetaminophen (TYLENOL) Suppository 650 mg    albuterol (PROVENTIL) neb solution 2.5 mg    atorvastatin (LIPITOR) tablet 10 mg    benzocaine-menthol (CHLORASEPTIC) 6-10 MG lozenge 1 lozenge    benzonatate (TESSALON) capsule 100 mg    calcium carbonate (TUMS) chewable tablet 1,000 mg    cefTRIAXone (ROCEPHIN) 1 g vial to attach to  mL bag for ADULTS or NS 50 mL bag for PEDS    guaiFENesin (ROBITUSSIN) 20 mg/mL solution 200 mg    hydrALAZINE (APRESOLINE) injection 10 mg    isosorbide mononitrate (IMDUR) 24 hr tablet 30 mg    levothyroxine (SYNTHROID/LEVOTHROID) tablet 88  mcg    lidocaine (LMX4) cream    lidocaine 1 % 0.1-1 mL    melatonin tablet 1 mg    naloxone (NARCAN) injection 0.2 mg    Or    naloxone (NARCAN) injection 0.4 mg    Or    naloxone (NARCAN) injection 0.2 mg    Or    naloxone (NARCAN) injection 0.4 mg    nitroGLYcerin (NITROSTAT) sublingual tablet 0.4 mg    ondansetron (ZOFRAN ODT) ODT tab 4 mg    Or    ondansetron (ZOFRAN) injection 4 mg    oxyCODONE (ROXICODONE) tablet 5 mg    oxyCODONE IR (ROXICODONE) half-tab 2.5 mg    phenol (CHLORASEPTIC) 1.4 % spray 1 mL    polyethylene glycol (MIRALAX) Packet 17 g    psyllium (METAMUCIL/KONSYL) Packet 2 packet    sodium chloride (PF) 0.9% PF flush 3 mL    sodium chloride (PF) 0.9% PF flush 3 mL    sodium phosphate (FLEET ENEMA) 1 enema     Social History     Socioeconomic History    Marital status:      Spouse name: Not on file    Number of children: Not on file    Years of education: Not on file    Highest education level: Not on file   Occupational History    Not on file   Tobacco Use    Smoking status: Former    Smokeless tobacco: Never   Substance and Sexual Activity    Alcohol use: Not Currently     Alcohol/week: 7.0 standard drinks of alcohol    Drug use: Never    Sexual activity: Not on file   Other Topics Concern    Not on file   Social History Narrative    Not on file     Social Determinants of Health     Financial Resource Strain: Not on file   Food Insecurity: Not on file   Transportation Needs: Not on file   Physical Activity: Not on file   Stress: Not on file   Social Connections: Not on file   Interpersonal Safety: Not on file   Housing Stability: Not on file     No family history on file.    ROS  - Twelve point review of systems were discussed with patient, positive finding in HPI      Objective:   VITALS:  BP (!) 169/72 (BP Location: Right arm)   Pulse 87   Temp 98.9  F (37.2  C) (Oral)   Resp 20   SpO2 97%   VENT:  Resp: 20    EXAM:   Gen: awake, alert, no distress  HEENT: pink conjunctiva, moist  mucosa, Mallampati II/IV  Neck: no thyromegaly, masses or JVD  Lungs: clear  CV: regular, no murmurs or gallops appreciated  Abdomen: soft, NT, BS wnl  Ext: no edema  Neuro: CN II-XII intact, non focal       Data Review:  Recent Labs   Lab 11/22/23  0748 11/21/23  2238   * 131*      11/22/23 07:48   Sodium 130 (L)   Potassium 2.8 (L)   Chloride 91 (L)   Carbon Dioxide (CO2) 28   Urea Nitrogen 5.7 (L)   Creatinine 0.52   GFR Estimate >90   Calcium 8.7 (L)   Anion Gap 11   Phosphorus 3.6   Albumin 3.3 (L)   Protein Total 6.7   Alkaline Phosphatase 66   ALT 43   AST 45   Bilirubin Direct 0.22   Bilirubin Total 0.7   Glucose 106 (H)   WBC 11.4 (H)   Hemoglobin 10.5 (L)   Hematocrit 31.0 (L)   Platelet Count 471 (H)   RBC Count 3.79 (L)   MCV 82   MCH 27.7   MCHC 33.9   RDW 11.9     CT CHEST PULMONARY EMBOLISM W CONTRAST  LOCATION: [facility]  DATE: 11/13/2023  INDICATION: chest tightness and shortness of breath  COMPARISON: None.  FINDINGS:  ANGIOGRAM CHEST: Pulmonary arteries are normal caliber and negative for pulmonary emboli. Thoracic aorta is negative for dissection. No CT evidence of right heart strain.  LUNGS AND PLEURA: Biapical scarring. No areas of consolidation. A few tree-in-bud nodules in the right middle lobe, which appear inflammatory. 6 mm nodule in the lingula (series 6 image #161) and 5 mm nodule in the left lower lobe (series 6 image #216).   No pleural effusion.  MEDIASTINUM/AXILLAE: No pericardial effusion. No thoracic lymphadenopathy.  CORONARY ARTERY CALCIFICATION: Severe.  UPPER ABDOMEN: Unremarkable.   MUSCULOSKELETAL: Age-indeterminate impacted right proximal humerus fracture. Healing right rib fractures.  IMPRESSION:  1.  No pulmonary embolism or acute aortic pathology.   2.  Impacted right proximal humerus fracture, which is age indeterminate. Correlate with pain.  3.  Small lingular and left lower lobe nodules, which are likely inflammatory though recommend  follow-up CT chest in 3 months.     CT CHEST/ABDOMEN/PELVIS W CONTRAST  LOCATION: [facility]  DATE: 11/22/2023  INDICATION: fever, hematuria  COMPARISON: 11/21/2023, 11/13/2023.  FINDINGS:   LUNGS AND PLEURA: Mild patchy foci of peripheral interstitial reticulation in the upper lung zones. No honeycombing. No acute airspace consolidation. 5 mm left lower lobe nodule image 212 of series 9 and stable over short interval. No pleural effusion.  MEDIASTINUM/AXILLAE: Heart size within normal limits. Multivessel coronary artery disease. No proximal pulmonary embolism. Atherosclerotic thoracic aorta.  CORONARY ARTERY CALCIFICATION: Severe.  HEPATOBILIARY: Normal.  PANCREAS: Normal.  SPLEEN: Normal.  ADRENAL GLANDS: Normal.  KIDNEYS/BLADDER: Normal kidneys. Nondistended bladder, though detail is compromised by beam hardening artifact from left hip arthroplasty.  BOWEL: Ileocolic and colorectal anastomotic sutures. No small bowel dilatation. Moderate amount of colonic stool. No free air.  LYMPH NODES: Normal.  VASCULATURE: Severe aortoiliac atherosclerotic calcification without aneurysm.  PELVIC ORGANS: Uterus is atrophic or absent.  MUSCULOSKELETAL: Osteopenia with diffuse thoracolumbar spine degenerative disc and facet changes. Left hip arthroplasty.  IMPRESSION:  1.  Very mild changes of chronic interstitial lung disease.  2.  Severe coronary artery disease.  3.  Moderate amount of colonic stool with large bowel resections as above. No small bowel obstruction or free air.  4.  No CT finding to explain patient's hematuria given compromised visualization of the bladder due to orthopedic hardware artifact.    By:  Amado Ortiz MD, 11/22/2023      Primary Care Physician:  No Ref-Primary, Physician

## 2023-11-24 ENCOUNTER — APPOINTMENT (OUTPATIENT)
Dept: PHYSICAL THERAPY | Facility: HOSPITAL | Age: 83
DRG: 872 | End: 2023-11-24
Attending: STUDENT IN AN ORGANIZED HEALTH CARE EDUCATION/TRAINING PROGRAM
Payer: COMMERCIAL

## 2023-11-24 VITALS
DIASTOLIC BLOOD PRESSURE: 83 MMHG | SYSTOLIC BLOOD PRESSURE: 118 MMHG | TEMPERATURE: 98.2 F | BODY MASS INDEX: 21.77 KG/M2 | OXYGEN SATURATION: 98 % | HEIGHT: 64 IN | WEIGHT: 127.5 LBS | HEART RATE: 81 BPM | RESPIRATION RATE: 18 BRPM

## 2023-11-24 PROBLEM — M62.81 GENERALIZED MUSCLE WEAKNESS: Chronic | Status: ACTIVE | Noted: 2023-11-24

## 2023-11-24 LAB
ANA SER QL IF: NEGATIVE
ANION GAP SERPL CALCULATED.3IONS-SCNC: 11 MMOL/L (ref 7–15)
BUN SERPL-MCNC: 6.3 MG/DL (ref 8–23)
CALCIUM SERPL-MCNC: 8.9 MG/DL (ref 8.8–10.2)
CHLORIDE SERPL-SCNC: 95 MMOL/L (ref 98–107)
CREAT SERPL-MCNC: 0.47 MG/DL (ref 0.51–0.95)
DEPRECATED HCO3 PLAS-SCNC: 28 MMOL/L (ref 22–29)
EGFRCR SERPLBLD CKD-EPI 2021: >90 ML/MIN/1.73M2
ERYTHROCYTE [DISTWIDTH] IN BLOOD BY AUTOMATED COUNT: 11.8 % (ref 10–15)
GLUCOSE SERPL-MCNC: 132 MG/DL (ref 70–99)
HCT VFR BLD AUTO: 32.5 % (ref 35–47)
HGB BLD-MCNC: 10.9 G/DL (ref 11.7–15.7)
MCH RBC QN AUTO: 27.7 PG (ref 26.5–33)
MCHC RBC AUTO-ENTMCNC: 33.5 G/DL (ref 31.5–36.5)
MCV RBC AUTO: 83 FL (ref 78–100)
OSMOLALITY SERPL: 273 MMOL/KG (ref 280–301)
PLATELET # BLD AUTO: 473 10E3/UL (ref 150–450)
POTASSIUM SERPL-SCNC: 4.2 MMOL/L (ref 3.4–5.3)
QUANTIFERON MITOGEN: 5.21 IU/ML
QUANTIFERON NIL TUBE: 0.04 IU/ML
QUANTIFERON TB1 TUBE: 0.04 IU/ML
QUANTIFERON TB2 TUBE: 0.03
RBC # BLD AUTO: 3.94 10E6/UL (ref 3.8–5.2)
SODIUM SERPL-SCNC: 134 MMOL/L (ref 135–145)
WBC # BLD AUTO: 11.1 10E3/UL (ref 4–11)

## 2023-11-24 PROCEDURE — 97116 GAIT TRAINING THERAPY: CPT | Mod: GP

## 2023-11-24 PROCEDURE — 80048 BASIC METABOLIC PNL TOTAL CA: CPT | Performed by: STUDENT IN AN ORGANIZED HEALTH CARE EDUCATION/TRAINING PROGRAM

## 2023-11-24 PROCEDURE — 250N000013 HC RX MED GY IP 250 OP 250 PS 637: Performed by: HOSPITALIST

## 2023-11-24 PROCEDURE — 250N000013 HC RX MED GY IP 250 OP 250 PS 637: Performed by: STUDENT IN AN ORGANIZED HEALTH CARE EDUCATION/TRAINING PROGRAM

## 2023-11-24 PROCEDURE — 36415 COLL VENOUS BLD VENIPUNCTURE: CPT | Performed by: STUDENT IN AN ORGANIZED HEALTH CARE EDUCATION/TRAINING PROGRAM

## 2023-11-24 PROCEDURE — 97161 PT EVAL LOW COMPLEX 20 MIN: CPT | Mod: GP

## 2023-11-24 PROCEDURE — 99239 HOSP IP/OBS DSCHRG MGMT >30: CPT | Performed by: STUDENT IN AN ORGANIZED HEALTH CARE EDUCATION/TRAINING PROGRAM

## 2023-11-24 PROCEDURE — 85027 COMPLETE CBC AUTOMATED: CPT | Performed by: STUDENT IN AN ORGANIZED HEALTH CARE EDUCATION/TRAINING PROGRAM

## 2023-11-24 RX ORDER — NITROFURANTOIN 25; 75 MG/1; MG/1
100 CAPSULE ORAL 2 TIMES DAILY
Qty: 8 CAPSULE | Refills: 0 | Status: SHIPPED | OUTPATIENT
Start: 2023-11-24 | End: 2023-11-28

## 2023-11-24 RX ORDER — BENZONATATE 100 MG/1
100 CAPSULE ORAL 3 TIMES DAILY PRN
Qty: 30 CAPSULE | Refills: 1 | Status: SHIPPED | OUTPATIENT
Start: 2023-11-24 | End: 2024-01-18

## 2023-11-24 RX ORDER — SODIUM CHLORIDE 1 G/1
0.5 TABLET ORAL 2 TIMES DAILY WITH MEALS
Qty: 2 TABLET | Refills: 0 | Status: SHIPPED | OUTPATIENT
Start: 2023-11-24

## 2023-11-24 RX ORDER — ACETAMINOPHEN 325 MG/1
650 TABLET ORAL EVERY 4 HOURS PRN
Qty: 10 TABLET | Refills: 0 | Status: SHIPPED | OUTPATIENT
Start: 2023-11-24 | End: 2023-11-24

## 2023-11-24 RX ORDER — ACETAMINOPHEN 325 MG/1
650 TABLET ORAL EVERY 4 HOURS PRN
Qty: 10 TABLET | Refills: 0 | Status: SHIPPED | OUTPATIENT
Start: 2023-11-24

## 2023-11-24 RX ORDER — SODIUM CHLORIDE 1 G/1
0.5 TABLET ORAL 2 TIMES DAILY WITH MEALS
Qty: 2 TABLET | Refills: 0 | Status: SHIPPED | OUTPATIENT
Start: 2023-11-24 | End: 2023-11-24

## 2023-11-24 RX ADMIN — Medication 1 LOZENGE: at 00:19

## 2023-11-24 RX ADMIN — BENZONATATE 100 MG: 100 CAPSULE ORAL at 00:16

## 2023-11-24 RX ADMIN — ASPIRIN 81 MG CHEWABLE TABLET 81 MG: 81 TABLET CHEWABLE at 09:15

## 2023-11-24 RX ADMIN — LEVOTHYROXINE SODIUM 88 MCG: 88 TABLET ORAL at 09:23

## 2023-11-24 RX ADMIN — ISOSORBIDE MONONITRATE 30 MG: 30 TABLET, EXTENDED RELEASE ORAL at 09:15

## 2023-11-24 ASSESSMENT — ACTIVITIES OF DAILY LIVING (ADL)
ADLS_ACUITY_SCORE: 28

## 2023-11-24 NOTE — PLAN OF CARE
Patient alert and oriented x4. Denied pain overnight. SBA. PIV is SL. VSS. Refused tylenol overnight. Slept well.     At 2300 patient reported feeling feverish, VSS. Provider updated. Continue to monitor per house officer. Patient felt better shortly after.   Problem: Pain Acute  Goal: Optimal Pain Control and Function  Outcome: Progressing     Problem: Fever  Goal: Body Temperature in Desired Range  Outcome: Progressing     Problem: Anemia  Goal: Anemia Symptom Improvement  Outcome: Progressing  Intervention: Monitor and Manage Anemia  Recent Flowsheet Documentation  Taken 11/24/2023 0400 by Renu Espinosa, RN  Safety Promotion/Fall Prevention:   assistive device/personal items within reach   room near nurse's station   room door open   supervised activity   toileting scheduled  Taken 11/24/2023 0045 by Renu Espinosa, RN  Safety Promotion/Fall Prevention:   assistive device/personal items within reach   room near nurse's station   room door open   supervised activity   toileting scheduled  Taken 11/23/2023 2100 by Renu Espinosa, RN  Safety Promotion/Fall Prevention:   assistive device/personal items within reach   room near nurse's station   room door open   supervised activity   toileting scheduled     Problem: Electrolyte Imbalance  Goal: Electrolyte Balance  Outcome: Progressing   Goal Outcome Evaluation:

## 2023-11-24 NOTE — PLAN OF CARE
Goal Outcome Evaluation:       Pt discharged home with  and son, went over the AVS. Instructed to follow up with primary as soon as possible.   Daya Stokes RN  November 24, 2023  12:44 PM

## 2023-11-24 NOTE — PROGRESS NOTES
Care Management Discharge Note    Discharge Date: 11/24/2023       Discharge Disposition:  Home    Discharge Services:  Home PT    Discharge DME:  None    Discharge Transportation:      Private pay costs discussed: Not applicable    Does the patient's insurance plan have a 3 day qualifying hospital stay waiver?  Yes     Which insurance plan 3 day waiver is available? Alternative insurance waiver    Will the waiver be used for post-acute placement? No    PAS Confirmation Code:  [id number]  Patient/family educated on Medicare website which has current facility and service quality ratings:  NA    Education Provided on the Discharge Plan:  Yes  Persons Notified of Discharge Plans: MD, RN,CM, patient  Patient/Family in Agreement with the Plan:  Yes    Handoff Referral Completed: Yes    Additional Information:  CM met with patient and  to discuss therapy rec. Patient agreeable to home care and understands home care agency might not be found prior to discharge but that [facility] will follow up.     Patient discharging home with home PT. Referral sent to [facility], they will follow up with patient when agency is found.     2:14 PM  [facility] accepted.    NEEL Keane

## 2023-11-24 NOTE — DISCHARGE SUMMARY
[facility]    Hospitalist Discharge Summary       Date of Admission:  11/21/2023  Date of Discharge:  11/24/2023 12:57 PM  Discharging Provider: Jen Orellana MD      Discharge Diagnoses   #Subacute cough with hemoptysis  #Acute bronchitis  #Lung nodules  # Arthralgia, Joint swelling and tenderness  #Sepsis  #Acute cystitis with hematuria      Follow-ups Needed After Discharge   Follow-up Appointments     Follow-up and recommended labs and tests       Follow up with primary care provider, Physician No Ref-Primary, within 7   days for hospital follow- up.  The following labs/tests are recommended:   BMP (Na,K), CBC, Recommend CT chest in 6 to 9 months for lung nodule   follow up    Follow up with Rheumatology within 4 weeks, referral provided    Follow up with primary Cardiologist within 1-2months for Coronary artery   disease            Unresulted Labs Ordered in the Past 30 Days of this Admission       Date and Time Order Name Status Description    11/23/2023  5:24 PM Quantiferon TB Gold Plus Purple Tube In process     11/23/2023  5:24 PM Quantiferon TB Gold Plus Yellow Tube In process     11/23/2023  5:24 PM Quantiferon TB Gold Plus Green Tube In process     11/23/2023  5:24 PM Quantiferon TB Gold Plus Grey Tube In process     11/22/2023  5:03 AM ANCA IgG by IFA with Reflex to Titer In process     11/22/2023  5:03 AM Anti Nuclear Ann Marie IgG by IFA with Reflex In process     11/21/2023 10:02 PM Blood Culture Line, venous Preliminary     11/21/2023 10:02 PM Blood Culture Line, venous Preliminary         These results will be followed up by [facility]   Jessica Cruz is a 83 year old female admitted on 11/21/2023. She came to the ED for evaluation of ongoing cough now with blood and hematuria     #Subacute cough with hemoptysis  #Acute bronchitis  #Lung nodules  -CT chest showed very mild changes of chronic interstitial lung disease; severe coronary artery  disease  -COVID infection at the end of 9/2023 treated with Paxlovid  -CRP inflammation 79.2, RF negative  -pending EDYTA, ANCA  -Supportive treatment, Hb stable  -s/p Pulm eval -follow-up chest CT scan in 6 to 9 months  -Quantiferon as patient has night sweats, denies weight loss, fever, exposure     # Arthralgia, Joint swelling and tenderness -OA versus rheumatological  multiple joint swelling and tenderness mainly in the PIP, DIP  RF negative  Pending EDYTA, ANCA  Follow up in Rheumatology OP     # Lower extremity edema  -Noted on lower extremity and hands - improved  -N-terminal proBNP 1141  -Echo EF 60-65% with no wall motion abnormalities     #Hypokalemia - resolved  Magnesium - normal    #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  TSH, Cortisol normal  Improved, discharged on salt tabs  Follow up with PCP    #Sepsis  #Acute cystitis with hematuria  Afebrile > 24 hrs, leukocytosis improving  -urine culture: mixture of urogenital jania  Received IV Ceftriaxone in hosp, discharged on Macrobid to complete 7days    #Low transit constipation - resolved  -CT showed moderate amount of colonic stool with large bowel resections; no small bowel obstruction     #Essential hypertension  #Anemia of chronic disease  #Coronary artery disease  #Hypothyroidism  # Hx Tubulovillous adenoma s/p right hemicolectomy       Consultations This Hospital Stay   PULMONARY IP CONSULT  CARE MANAGEMENT / SOCIAL WORK IP CONSULT  PHYSICAL THERAPY ADULT IP CONSULT    Code Status   Full Code    Time Spent on this Encounter   IJen, personally saw the patient today and spent approximately 35 minutes discharging this patient.       Jen Orellana MD  [facility]  ______________________________________________________________________    Physical Exam   Vital Signs: Temp: 98.2  F (36.8  C) Temp src: Oral BP: (!) 165/74 Pulse: 69   Resp: 18 SpO2: 92 % O2 Device: None (Room air)    Weight: 127 lbs 8 oz    Physical Exam     Constitutional: awake and alert  Respiratory: no increased work of breathing, good air exchange, and diminished breath sounds right base and left base  Cardiovascular: regular rate and rhythm, normal S1 and S2  GI: normal bowel sounds  Musculoskeletal: 1+ pitting edema of lower extremities, bilateral hand swelling especially PIP and DIP of second and third digits on the right hand and second, third and fourth digits of the left hand  Neurologic: Mental Status Exam:  Orientation:  person, place, time  Motor Exam:  moves all extremities well and symmetrically    Primary Care Physician   Physician No Ref-Primary    Discharge Disposition   Discharged to home  Condition at discharge: Stable    Significant Results and Procedures   Most Recent 3 CBC's:  Recent Labs   Lab Test 11/24/23  0821 11/23/23  0630 11/22/23  0748   WBC 11.1* 13.6* 11.4*   HGB 10.9* 9.4* 10.5*   MCV 83 83 82   * 427 471*     Most Recent 3 BMP's:  Recent Labs   Lab Test 11/24/23 0821 11/23/23  1724 11/23/23  0630 11/22/23  1620 11/22/23  0748   *  --  126*  --  130*   POTASSIUM 4.2 3.6 2.7*   < > 2.8*   CHLORIDE 95*  --  89*  --  91*   CO2 28  --  29  --  28   BUN 6.3*  --  6.1*  --  5.7*   CR 0.47*  --  0.48*  --  0.52   ANIONGAP 11  --  8  --  11   DIMITRIS 8.9  --  8.6*  --  8.7*   *  --  153*  --  106*    < > = values in this interval not displayed.     Most Recent 2 LFT's:  Recent Labs   Lab Test 11/22/23 0748 11/13/23  1048   AST 45 16   ALT 43 15   ALKPHOS 66 63   BILITOTAL 0.7 0.9     Most Recent 3 INR's:  Recent Labs   Lab Test 11/21/23 2238 11/13/23  1048 06/01/20  1458   INR 1.21* 1.18* 1.05     Most Recent 3 Troponin's:No lab results found.  Most Recent 3 BNP's:  Recent Labs   Lab Test 11/21/23 2238 11/13/23  1048   NTBNPI  --  1,257   NTBNP 1,141  --      Most Recent D-dimer:  Recent Labs   Lab Test 11/13/23  1048   DD 4.32*     Most Recent Cholesterol Panel:No lab results found.    Results for orders placed or  performed during the hospital encounter of 11/21/23   Chest XR,  PA & LAT    Narrative    EXAM: XR CHEST 2 VIEWS  LOCATION: [facility]  DATE: 11/21/2023    INDICATION: dyspnea  COMPARISON: CT PE chest 11/13/2023, 2 view chest radiograph 10/21/2023      Impression    IMPRESSION: Stable enlarged heart. Mild central vascular prominence. Left basilar atelectasis. No other focal pulmonary consolidation, or pleural effusion. Tortuous thoracic aorta with atherosclerotic calcifications of the aortic arch. Redemonstration of   age-indeterminate impacted fracture of the right humeral neck. Multilevel degenerative changes of the thoracic spine. Mild vertebral wedging at several mid thoracic levels, age indeterminate but favoring chronic. No pneumothorax.   CT Chest/Abdomen/Pelvis w Contrast    Narrative    EXAM: CT CHEST/ABDOMEN/PELVIS W CONTRAST  LOCATION: [facility]  DATE: 11/22/2023    INDICATION: fever, hematuria  COMPARISON: 11/21/2023, 11/13/2023.  TECHNIQUE: CT scan of the chest, abdomen, and pelvis was performed following injection of IV contrast. Multiplanar reformats were obtained. Dose reduction techniques were used.   CONTRAST: 75ml Isovue 370    FINDINGS:   LUNGS AND PLEURA: Mild patchy foci of peripheral interstitial reticulation in the upper lung zones. No honeycombing. No acute airspace consolidation. 5 mm left lower lobe nodule image 212 of series 9 and stable over short interval. No pleural effusion.    MEDIASTINUM/AXILLAE: Heart size within normal limits. Multivessel coronary artery disease. No proximal pulmonary embolism. Atherosclerotic thoracic aorta.    CORONARY ARTERY CALCIFICATION: Severe.    HEPATOBILIARY: Normal.    PANCREAS: Normal.    SPLEEN: Normal.    ADRENAL GLANDS: Normal.    KIDNEYS/BLADDER: Normal kidneys. Nondistended bladder, though detail is compromised by beam hardening artifact from left hip arthroplasty.    BOWEL: Ileocolic and  colorectal anastomotic sutures. No small bowel dilatation. Moderate amount of colonic stool. No free air.    LYMPH NODES: Normal.    VASCULATURE: Severe aortoiliac atherosclerotic calcification without aneurysm.    PELVIC ORGANS: Uterus is atrophic or absent.    MUSCULOSKELETAL: Osteopenia with diffuse thoracolumbar spine degenerative disc and facet changes. Left hip arthroplasty.      Impression    IMPRESSION:  1.  Very mild changes of chronic interstitial lung disease.    2.  Severe coronary artery disease.    3.  Moderate amount of colonic stool with large bowel resections as above. No small bowel obstruction or free air.    4.  No CT finding to explain patient's hematuria given compromised visualization of the bladder due to orthopedic hardware artifact.   Echocardiogram Complete     Value    LVEF  60-65%    Narrative    [id number]  [id number]  [id number]  [id number]^JOHNATHAN^TAMIR^[place] [address]     Name: LUANNE GONZALEZ  MRN: [id number]  : 1940  Study Date: 2023 11:06 AM  Age: 83 yrs  Gender: Female  Patient Location: [facility]  Reason For Study: CHF  Ordering Physician: TAMIR MANN  Performed By: LEEANN     BSA: 1.6 m2  Height: 64 in  Weight: 130 lb  HR: 78  BP: 130/61 mmHg  ______________________________________________________________________________  Procedure  Complete Portable Echo Adult. Adequate quality two-dimensional was performed  and interpreted. No hemodynamically significant valvular abnormalities on 2D  or color flow imaging. There is no comparison study available.  ______________________________________________________________________________  Interpretation Summary     Left ventricular size, wall motion and function are normal. The ejection  fraction is 60-65%.  Normal right ventricle size and systolic function.  No hemodynamically significant valvular abnormalities on 2D or color flow  imaging.  IVC diameter <2.1 cm collapsing >50% with  sniff suggests a normal RA pressure  of 3 mmHg.  There is no comparison study available.  ______________________________________________________________________________  Left Ventricle  Left ventricular size, wall motion and function are normal. The ejection  fraction is 60-65%. There is normal left ventricular wall thickness. Left  ventricular diastolic function is indeterminate.     Right Ventricle  Normal right ventricle size and systolic function.     Atria  Normal left atrial size. Right atrial size is normal. There is no color  Doppler evidence of an atrial shunt.     Mitral Valve  Mitral valve leaflets appear normal. There is no evidence of mitral stenosis  or clinically significant mitral regurgitation.     Tricuspid Valve  Tricuspid valve leaflets appear normal. There is no evidence of tricuspid  stenosis or clinically significant tricuspid regurgitation. Right ventricular  systolic pressure could not be approximated due to inadequate tricuspid  regurgitation.     Aortic Valve  There is mild trileaflet aortic sclerosis. There is mild (1+) aortic  regurgitation. No aortic stenosis is present.     Pulmonic Valve  The pulmonic valve is not well seen, but is grossly normal. This degree of  valvular regurgitation is within normal limits. There is trace pulmonic  valvular regurgitation.     Vessels  Normal size ascending aorta. Aortic root dilatation is present. IVC diameter  <2.1 cm collapsing >50% with sniff suggests a normal RA pressure of 3 mmHg.     Pericardium  There is no pericardial effusion.     Rhythm  Sinus rhythm was noted.  ______________________________________________________________________________  MMode/2D Measurements & Calculations     IVSd: 0.92 cm  LVIDd: 4.7 cm  LVIDs: 3.3 cm  LVPWd: 0.79 cm  FS: 29.0 %  LV mass(C)d: 131.5 grams  LV mass(C)dI: 80.7 grams/m2  Ao root diam: 4.1 cm  LA dimension: 3.4 cm  asc Aorta Diam: 3.9 cm  LA/Ao: 0.84  LVOT diam: 2.3 cm  LVOT area: 4.1 cm2  Ao root diam  index Ht(cm/m): 2.5  Ao root diam index BSA (cm/m2): 2.5  Asc Ao diam index BSA (cm/m2): 2.4  Asc Ao diam index Ht(cm/m): 2.4     LA Volume Indexed (AL/bp): 32.1 ml/m2  RV Base: 3.5 cm  RWT: 0.34  TAPSE: 2.9 cm     Time Measurements  MM HR: 78.0 BPM     Doppler Measurements & Calculations  MV E max mihir: 65.6 cm/sec  MV A max mihir: 103.7 cm/sec  MV E/A: 0.63  MV dec slope: 205.9 cm/sec2  MV dec time: 0.32 sec  Ao V2 max: 172.3 cm/sec  Ao max P.0 mmHg  Ao V2 mean: 117.1 cm/sec  Ao mean P.4 mmHg  Ao V2 VTI: 32.2 cm  JOSE CARLOS(I,D): 2.6 cm2  JOSE CARLOS(V,D): 2.5 cm2  AI P1/2t: 398.1 msec  LV V1 max P.6 mmHg  LV V1 max: 107.1 cm/sec  LV V1 VTI: 20.5 cm  SV(LVOT): 83.7 ml  SI(LVOT): 51.4 ml/m2     PA acc time: 0.08 sec  AV Mihir Ratio (DI): 0.62  JOSE CARLOS Index (cm2/m2): 1.6  E/E': 11.0  E/E' av.2  Lateral E/e': 11.4  Medial E/e': 11.0  Peak E' Mihir: 6.0 cm/sec  RV S Mihir: 15.8 cm/sec     ______________________________________________________________________________  Report approved by: Roberto Guevara 2023 01:10 PM             Discharge Orders      Adult Rheumatology  Referral      Home Care Referral      Reason for your hospital stay    #Sepsis  #Acute cystitis with hematuria  #Subacute cough with minimal hemoptysis     Follow-up and recommended labs and tests     Follow up with primary care provider, Physician No Ref-Primary, within 7 days for hospital follow- up.  The following labs/tests are recommended: BMP (Na,K), CBC, Recommend CT chest in 6 to 9 months for lung nodule follow up    Follow up with Rheumatology within 4 weeks, referral provided    Follow up with primary Cardiologist within 1-2months for Coronary artery disease     Activity    Your activity upon discharge: activity as tolerated     Diet    Low Saturated Fat Na <2400 mg     Discharge Medications   Current Discharge Medication List        START taking these medications    Details   acetaminophen (TYLENOL) 325 MG tablet Take 2 tablets  (650 mg) by mouth every 4 hours as needed for mild pain or other (and adjunct with moderate or severe pain or per patient request)  Qty: 10 tablet, Refills: 0    Associated Diagnoses: Acute cystitis with hematuria      benzocaine-menthol (CHLORASEPTIC) 6-10 MG lozenge Place 1 lozenge inside cheek every hour as needed for sore throat (without fever)  Qty: 18 lozenge, Refills: 0    Associated Diagnoses: Subacute cough      nitroFURantoin macrocrystal-monohydrate (MACROBID) 100 MG capsule Take 1 capsule (100 mg) by mouth 2 times daily for 4 days  Qty: 8 capsule, Refills: 0    Associated Diagnoses: Acute cystitis with hematuria      sodium chloride 1 GM tablet Take 0.5 tablets (0.5 g) by mouth 2 times daily (with meals)  Qty: 2 tablet, Refills: 0    Associated Diagnoses: Hyponatremia; SIADH (syndrome of inappropriate ADH production) (H24)           CONTINUE these medications which have NOT CHANGED    Details   aspirin 81 mg chewable tablet [ASPIRIN 81 MG CHEWABLE TABLET] Chew 1 tablet (81 mg total) 2 (two) times a day.  Refills: 0    Associated Diagnoses: Hip fracture, left, closed, initial encounter (H)      atorvastatin (LIPITOR) 10 MG tablet [ATORVASTATIN (LIPITOR) 10 MG TABLET] Take 10 mg by mouth at bedtime.      benzonatate (TESSALON) 100 MG capsule Take 1 capsule (100 mg) by mouth 3 times daily as needed for cough  Qty: 15 capsule, Refills: 0      cholecalciferol, vitamin D3, 1,000 unit tablet [CHOLECALCIFEROL, VITAMIN D3, 1,000 UNIT TABLET] Take 2,000 Units by mouth daily.      coenzyme Q10 (CO Q-10) 100 mg capsule [COENZYME Q10 (CO Q-10) 100 MG CAPSULE] Take 100 mg by mouth daily.      fish oil-omega-3 fatty acids 1000 MG capsule Take 2 g by mouth daily      isosorbide mononitrate (IMDUR) 30 MG 24 hr tablet [ISOSORBIDE MONONITRATE (IMDUR) 30 MG 24 HR TABLET] Take 30 mg by mouth daily.      Lactobacillus rhamnosus GG (CULTURELLE) 10-15 Billion cell capsule [LACTOBACILLUS RHAMNOSUS GG (CULTURELLE) 10-15 BILLION  "CELL CAPSULE] Take 1 capsule by mouth daily.      levothyroxine (SYNTHROID, LEVOTHROID) 88 MCG tablet [LEVOTHYROXINE (SYNTHROID, LEVOTHROID) 88 MCG TABLET] Take 88 mcg by mouth daily.      multivitamin therapeutic tablet [MULTIVITAMIN THERAPEUTIC TABLET] Take 1 tablet by mouth daily.      polyethylene glycol (MIRALAX) 17 gram packet [POLYETHYLENE GLYCOL (MIRALAX) 17 GRAM PACKET] Take 1 packet (17 g total) by mouth 2 (two) times a day.  Refills: 0    Associated Diagnoses: Hip fracture, left, closed, initial encounter (H)      psyllium (METAMUCIL) 3.4 gram packet [PSYLLIUM (METAMUCIL) 3.4 GRAM PACKET] Take 2 packets by mouth daily.  Refills: 0    Associated Diagnoses: Hip fracture, left, closed, initial encounter (H)      nitroglycerin (NITROSTAT) 0.4 MG SL tablet [NITROGLYCERIN (NITROSTAT) 0.4 MG SL TABLET] Place 0.4 mg under the tongue every 5 (five) minutes as needed for chest pain.           Allergies   Allergies   Allergen Reactions    Ace Inhibitors Other (See Comments)     Angioedema per Dr Сергей Krishnamurthy., Pt developed facial bruising and headache after taking lisinopil., See 6/27/16 telephone encounter, Lisinopril , Angioedema per Dr Сергей Krishnamurthy., Pt developed facial bruising and headache after taking lisinopil., See 6/27/16 telephone encounter, Angioedema per Dr Сергей Krishnamurthy., Pt developed facial bruising and headache after taking lisinopil., See 6/27/16 telephone encounter    Latex Unknown and Rash     \"rash with latex gloves\"    Adhesive Tape-Silicones [Adhesive Tape] Dermatitis     Severe Blisters wherever adhesive touches, Her skin. , Paper tape is OK    Amoxicillin Other (See Comments)     Pt states oral amoxicillin \"doesn't work, the infection keeps coming back\"    Pravastatin Unknown     Leg cramps    Prinivil [Lisinopril] Unknown     bruising    Senna Other (See Comments)     Colon inflamation    Temazepam Other (See Comments)     Balance problem,s    Trazodone Unknown     Tongue swelling; anxiety    " "Alendronate [Alendronate] Unknown and Other (See Comments)     \"constipated\", \"constipated\"    Flonase [Fluticasone] Rash    Metoprolol Succinate [Metoprolol] Rash    Simvastatin Rash    Sulfa (Sulfonamide Antibiotics) [Sulfa Antibiotics] Rash       "

## 2023-11-24 NOTE — PROGRESS NOTES
"   11/24/23 4080   Appointment Info   Signing Clinician's Name / Credentials (PT) Amee Enamorado, PT, DPT   Living Environment   People in Home spouse   Current Living Arrangements   ([facility])   Home Accessibility no concerns   Self-Care   Equipment Currently Used at Home walker, rolling   Fall history within last six months yes   Number of times patient has fallen within last six months 1   Activity/Exercise/Self-Care Comment pt reports doing all personal cares and mobility independently with FWW   General Information   Onset of Illness/Injury or Date of Surgery 11/21/23   Referring Physician Jen Orellana MD   Patient/Family Therapy Goals Statement (PT) Return to home   Pertinent History of Current Problem (include personal factors and/or comorbidities that impact the POC) Per Chart Review -\"83 year old female admitted on 11/21/2023. She came to the ED for evaluation of ongoing cough now with blood and hematuria\"   Existing Precautions/Restrictions fall   Range of Motion (ROM)   Range of Motion ROM is WFL   Strength (Manual Muscle Testing)   Strength (Manual Muscle Testing) Deficits observed during functional mobility   Bed Mobility   Bed Mobility supine-sit;sit-supine   Supine-Sit [place] (Bed Mobility) supervision;verbal cues;contact guard   Sit-Supine [place] (Bed Mobility) supervision;verbal cues;contact guard   Transfers   Transfers sit-stand transfer   Maintains Weight-bearing Status (Transfers) able to maintain   Sit-Stand Transfer   Sit-Stand [place] (Transfers) supervision;verbal cues;contact guard   Assistive Device (Sit-Stand Transfers) walker, front-wheeled   Gait/Stairs (Locomotion)   [place] Level (Gait) supervision;verbal cues;contact guard   Assistive Device (Gait) walker, front-wheeled   Distance in Feet (Gait) 5   Pattern (Gait) step-through;swing-through   Deviations/Abnormal Patterns (Gait) gait speed decreased;brianna decreased   Clinical Impression   Criteria for " Skilled Therapeutic Intervention Yes, treatment indicated   PT Diagnosis (PT) Impaired Functional Mobility   Influenced by the following impairments weakness, impaired balance   Functional limitations due to impairments gait, transfers   Clinical Presentation (PT Evaluation Complexity) stable   Clinical Presentation Rationale pt presents as medically diagnosed   Clinical Decision Making (Complexity) low complexity   Planned Therapy Interventions (PT) balance training;bed mobility training;gait training;home exercise program;strengthening;transfer training   Risk & Benefits of therapy have been explained evaluation/treatment results reviewed;patient   PT Total Evaluation Time   PT Eval, Low Complexity Minutes (82938) 10   Physical Therapy Goals   PT Frequency Daily   PT Predicted Duration/Target Date for Goal Attainment 11/30/23   PT Goals Transfers;Gait   Interventions   Interventions Quick Adds Gait Training   Gait Training   Gait Training Minutes (03167) 10   Symptoms Noted During/After Treatment (Gait Training) fatigue   Treatment Detail/Skilled Intervention cueing for safety/posture/picking up feet/keeping FWW close - pt reporting feeling steady with & use of FWW was necessary d/t weakness   Distance in Feet 400   [place] Level (Gait Training) contact guard   Physical Assistance Level (Gait Training) supervision;verbal cues   Weight Bearing (Gait Training) full weight-bearing   Assistive Device (Gait Training) rolling walker   Pattern Analysis (Gait Training) swing-through gait   Gait Analysis Deviations decreased brianna;decreased step length   Impairments (Gait Analysis/Training) strength decreased   PT Discharge Planning   PT Plan LE strength, gait with FWW   PT Discharge Recommendation (DC Rec) home with assist;home with home care physical therapy   PT Rationale for DC Rec home with assist from spouse as needed, pt able to complete functional mobility required at home; Home PT for improving  strength/balance/activity tolerance   PT Brief overview of current status ' gait & bed mobility & transfers   PT Equipment Needed at Discharge walker, rolling   Total Session Time   Timed Code Treatment Minutes 10   Total Session Time (sum of timed and untimed services) 20

## 2023-11-25 LAB
GAMMA INTERFERON BACKGROUND BLD IA-ACNC: 0.04 IU/ML
M TB IFN-G BLD-IMP: NEGATIVE
M TB IFN-G CD4+ BCKGRND COR BLD-ACNC: 5.17 IU/ML
MITOGEN IGNF BCKGRD COR BLD-ACNC: -0.01 IU/ML
MITOGEN IGNF BCKGRD COR BLD-ACNC: 0 IU/ML

## 2023-11-26 ENCOUNTER — HOSPITAL ENCOUNTER (EMERGENCY)
Facility: HOSPITAL | Age: 83
Discharge: HOME | End: 2023-11-26
Attending: EMERGENCY MEDICINE | Admitting: EMERGENCY MEDICINE
Payer: COMMERCIAL

## 2023-11-26 ENCOUNTER — NURSE TRIAGE (OUTPATIENT)
Dept: NURSING | Facility: CLINIC | Age: 83
End: 2023-11-26
Payer: COMMERCIAL

## 2023-11-26 VITALS
WEIGHT: 131.2 LBS | HEIGHT: 64 IN | SYSTOLIC BLOOD PRESSURE: 139 MMHG | DIASTOLIC BLOOD PRESSURE: 60 MMHG | RESPIRATION RATE: 19 BRPM | HEART RATE: 80 BPM | TEMPERATURE: 98.1 F | BODY MASS INDEX: 22.4 KG/M2 | OXYGEN SATURATION: 99 %

## 2023-11-26 DIAGNOSIS — H15.003 BILATERAL SCLERITIS: ICD-10-CM

## 2023-11-26 LAB
ANCA AB PATTERN SER IF-IMP: ABNORMAL
C-ANCA TITR SER IF: ABNORMAL {TITER}

## 2023-11-26 PROCEDURE — 99283 EMERGENCY DEPT VISIT LOW MDM: CPT

## 2023-11-26 ASSESSMENT — ACTIVITIES OF DAILY LIVING (ADL)
ADLS_ACUITY_SCORE: 33
ADLS_ACUITY_SCORE: 35

## 2023-11-26 NOTE — ED TRIAGE NOTES
"Patient arrives to triage from home with spouse with chief complaint of bilateral eye pain which started last Wednesday while she was here in the hospital.  Patient was discharged on Friday from here.  Bilateral eyes are reddened and patient states \"anything bright or bright lights hurt them.\"  Concerned about vision, reports \"I have been noticing its difficult to see signs correctly.\"  Alert and oriented x4.         "

## 2023-11-26 NOTE — ED PROVIDER NOTES
EMERGENCY DEPARTMENT ENCOUNTER      NAME: Jessica Cruz  AGE: 83 year old female  YOB: 1940  MRN: [id number]  EVALUATION DATE & TIME: 11/26/2023  2:12 PM    PCP: No Ref-Primary, Physician    ED PROVIDER: Darion Joyce M.D.      Chief Complaint   Patient presents with    Eye Pain         FINAL IMPRESSION:  1. Bilateral scleritis          ED COURSE & MEDICAL DECISION MAKING:    Pertinent Labs & Imaging studies reviewed. (See chart for details)  83 year old female presents to the Emergency Department for evaluation of bilateral eye redness. Patient appears non toxic with stable vitals signs, patient afebrile with no tachycardia or hypoxia.  Lungs are clear and abdomen is benign.  Patient denies any eye trauma, foreign body sensation, discharge, fevers.  She does endorse heavy coughing, eye redness and pain with bright lights in the eye.  Per review of the medical record I did appreciate discharge summary on 11/24/2023 where patient was discharged from [facility] following cough, acute bronchitis, sepsis, acute cystitis.  Patient was discharged home with Tylenol, Chloraseptic lozenges, Macrobid.  Exam is significant for mild bilateral conjunctival erythema without discharge, she has a large area of redness on her right eye just medial to the pupil which appears to be subconjunctival hemorrhage, small area, likely secondary to her cough.  Visual acuity is normal, she denies any loss of vision, visual acuity reassuring.  Did note some reports of discomfort with light shining into her eye but pupils reactive.  Clinically, given history and exam noted, low suspicion for acute angle-closure glaucoma, denies any injury or foreign body with certainly nothing suggest globe rupture, corneal abrasion or ulceration, no temporal tenderness or skin changes to suggest facial cellulitis, temporal arteritis, herpetic infection.  Considered labs and CT imaging but given benign exam and well  appearance, no vision loss or severe eye pain, no indication for labs or imaging studies, again nothing to suggest corneal abrasion or ulceration, globe rupture.  Did discuss patient case, findings and recent admission with ophthalmology on-call and at this time there were no strict recommendations for emergent labs or imaging studies or need for emergent transfer, again reassured by no vision loss or severe eye pain.  In discussion with ophthalmology and review of recent admission, it appears as though the patient was having somewhat of a rheumatology workup and ophthalmology concern for possible scleritis secondary to rheumatologic etiology and did recommend the patient follow-up closely with her primary ophthalmology group or our [facility] ophthalmology group for continued outpatient evaluation.  There was also no strict recommendations for any other adjunctive treatments or eyedrops, antibiotics tonight.  I also discussed with ophthalmology the fact that the patient was currently on cough lodging's, Tylenol and Macrobid and ophthalmology provided reassurance that the patient can continue these medications. I felt this was reasonable given otherwise benign exam and reassuring vitals.  I discussed all these recommendations with patient and spouse following my discussion with ophthalmology and they otherwise felt very reassured and requested contact information for [facility] ophthalmology for close outpatient follow-up and I did provide contact information and phone number.  Will recommend follow-up with ophthalmology in the next 1 or 2 days for continued outpatient management evaluation.  All questions were answered and reasons to return discussed.  Patient felt comfortable this plan was discharged in stable condition.        Medical Decision Making    History:  Supplemental history from: Documented in chart, if applicable  External Record(s) reviewed: Documented in chart, if applicable. and  Outpatient Record: [facility] 11/21/23-/11/24/23     Work Up:  Chart documentation includes differential considered and any EKGs or imaging independently interpreted by provider, where specified.  In additional to work up documented, I considered the following work up: Documented in chart, if applicable.    External consultation:  Discussion of management with another provider: Documented in chart, if applicable and Ophthalmology    Complicating factors:  Care impacted by chronic illness: Cancer/Chemotherapy, Heart Disease, and Hyperlipidemia  Care affected by social determinants of health: N/A    Disposition considerations: Discharge. I recommended the patient continue their current prescription strength medication(s): Tylenol, Macrobid. See documentation for any additional details.      4:04 PM I met with the patient for the initial interview and physical examination. Discussed plan for treatment and workup in the ED.     4:54 PM I spoke with Dr. Hutton, ophthalmology, about the patient.    5:25 PM repeat exam is benign, discussed ophthalmology recommendations, discharge and close follow-up.      At the conclusion of the encounter I discussed the results of all of the tests and the disposition. The questions were answered and return precautions provided. The patient or family acknowledged understanding and was agreeable with the care plan.         MEDICATIONS GIVEN IN THE EMERGENCY:  Medications - No data to display    NEW PRESCRIPTIONS STARTED AT TODAY'S ER VISIT  Discharge Medication List as of 11/26/2023  5:38 PM               =================================================================    HPI    Patient information was obtained from: patient     Use of Intrepreter: N/A       Jessica Cruz is a 83 year old female who presents with eye pain.     Per chart review:   Patient was admitted to [facility] from 11/21/23-11/24/23 for hemoptysis and hematuria.  Chest CT showed mild changes of chronic interstitial lung disease and severe CAD. CRP was 79.2. Echo sowed EF 60-65%. Patient urine culture had a mix of urogenital jania so received IV ceftriaxone in hospital and discharged on 7 days of Macrobid. She was given chloraseptic lozenge, sodium tablets, and tylenol on discharge.      Patient reports worsening eye redness since 11/24 with associated eye soreness and photophobia. Denies trauma to eyes. Nurse recommended she come and be evaluated as she is on medications that can cause red eyes, she is unsure what medications that is however. She notes some photophobia and headache while in the hospital, but was not evaluated for it Denies glaucoma or macular degeneration. She notes she had a right blood clot behind her right eye ~3 months ago, but this was re-evaluated at [facility] 3 weeks ago and was resolved. Endorses right ear ache.     Patient has been still coughing.     REVIEW OF SYSTEMS   Constitutional:  Denies fever, chills.  Eye: positive for eye redness, pain, and photophobia.   Respiratory:  Positive cough Denies increased work of breathing  Cardiovascular:  Denies chest pain, palpitations  GI:  Denies abdominal pain, nausea, vomiting, or change in bowel or bladder habits   Musculoskeletal:  Denies any new muscle/joint swelling  Skin:  Denies rash   Neurologic:  Denies focal weakness  All systems negative except as marked.     PAST MEDICAL HISTORY:  Past Medical History:   Diagnosis Date    Aortic regurgitation     Colon cancer (H)     Coronary artery disease     previous PCI    Diverticulosis     History of anesthesia complications     Hyperlipidemia     PONV (postoperative nausea and vomiting)        PAST SURGICAL HISTORY:  Past Surgical History:   Procedure Laterality Date    ANGIOPLASTY      ZC PARTIAL HIP REPLACEMENT Left 6/2/2020    Procedure: HEMIARTHROPLASTY, HIP, BIPOLAR LEFT;  Surgeon: Jorge Luis Aceves MD;  Location: [facility];  Service:  Orthopedics         CURRENT MEDICATIONS:    Prior to Admission medications    Medication Sig Start Date End Date Taking? Authorizing Provider   acetaminophen (TYLENOL) 325 MG tablet Take 2 tablets (650 mg) by mouth every 4 hours as needed for mild pain or other (and adjunct with moderate or severe pain or per patient request) 11/24/23   Jen Orellana MD   aspirin 81 mg chewable tablet [ASPIRIN 81 MG CHEWABLE TABLET] Chew 1 tablet (81 mg total) 2 (two) times a day. 6/5/20   Marlena Manrique DO   atorvastatin (LIPITOR) 10 MG tablet [ATORVASTATIN (LIPITOR) 10 MG TABLET] Take 10 mg by mouth at bedtime. 6/1/20   Provider, Historical   benzocaine-menthol (CHLORASEPTIC) 6-10 MG lozenge Place 1 lozenge inside cheek every hour as needed for sore throat (without fever) 11/24/23   Jen Orellana MD   benzonatate (TESSALON) 100 MG capsule Take 1 capsule (100 mg) by mouth 3 times daily as needed for cough 11/24/23   Jen Orellana MD   cholecalciferol, vitamin D3, 1,000 unit tablet [CHOLECALCIFEROL, VITAMIN D3, 1,000 UNIT TABLET] Take 2,000 Units by mouth daily. 12/22/17   Provider, Historical   coenzyme Q10 (CO Q-10) 100 mg capsule [COENZYME Q10 (CO Q-10) 100 MG CAPSULE] Take 100 mg by mouth daily. 6/1/20   Provider, Historical   fish oil-omega-3 fatty acids 1000 MG capsule Take 2 g by mouth daily    Reported, Patient   isosorbide mononitrate (IMDUR) 30 MG 24 hr tablet [ISOSORBIDE MONONITRATE (IMDUR) 30 MG 24 HR TABLET] Take 30 mg by mouth daily. 12/22/17   Provider, Historical   Lactobacillus rhamnosus GG (CULTURELLE) 10-15 Billion cell capsule [LACTOBACILLUS RHAMNOSUS GG (CULTURELLE) 10-15 BILLION CELL CAPSULE] Take 1 capsule by mouth daily. 12/22/17   Provider, Historical   levothyroxine (SYNTHROID, LEVOTHROID) 88 MCG tablet [LEVOTHYROXINE (SYNTHROID, LEVOTHROID) 88 MCG TABLET] Take 88 mcg by mouth daily. 12/22/17   Provider, Historical   multivitamin therapeutic tablet [MULTIVITAMIN THERAPEUTIC TABLET] Take 1  "tablet by mouth daily. 12/22/17   Provider, Historical   nitroFURantoin macrocrystal-monohydrate (MACROBID) 100 MG capsule Take 1 capsule (100 mg) by mouth 2 times daily for 4 days 11/24/23 11/28/23  Jen Orellana MD   nitroFURantoin macrocrystal-monohydrate (MACROBID) 100 MG capsule Take 1 capsule (100 mg) by mouth 2 times daily for 4 days 11/24/23 11/28/23  Jen Orellana MD   nitroglycerin (NITROSTAT) 0.4 MG SL tablet [NITROGLYCERIN (NITROSTAT) 0.4 MG SL TABLET] Place 0.4 mg under the tongue every 5 (five) minutes as needed for chest pain. 12/22/17   Provider, Historical   polyethylene glycol (MIRALAX) 17 gram packet [POLYETHYLENE GLYCOL (MIRALAX) 17 GRAM PACKET] Take 1 packet (17 g total) by mouth 2 (two) times a day. 6/5/20   Marlena Manrique,    psyllium (METAMUCIL) 3.4 gram packet [PSYLLIUM (METAMUCIL) 3.4 GRAM PACKET] Take 2 packets by mouth daily. 6/5/20   Marlena Manrique DO   sodium chloride 1 GM tablet Take 0.5 tablets (0.5 g) by mouth 2 times daily (with meals) 11/24/23   Jen Orellana MD        ALLERGIES:  Allergies   Allergen Reactions    Ace Inhibitors Other (See Comments)     Angioedema per Dr Сергей Krishnamurthy., Pt developed facial bruising and headache after taking lisinopil., See 6/27/16 telephone encounter, Lisinopril , Angioedema per Dr Сергей Krishnamurthy., Pt developed facial bruising and headache after taking lisinopil., See 6/27/16 telephone encounter, Angioedema per Dr Сергей Krishnamurthy., Pt developed facial bruising and headache after taking lisinopil., See 6/27/16 telephone encounter    Latex Unknown and Rash     \"rash with latex gloves\"    Adhesive Tape-Silicones [Adhesive Tape] Dermatitis     Severe Blisters wherever adhesive touches, Her skin. , Paper tape is OK    Amoxicillin Other (See Comments)     Pt states oral amoxicillin \"doesn't work, the infection keeps coming back\"    Pravastatin Unknown     Leg cramps    Prinivil [Lisinopril] Unknown     bruising    Senna Other (See Comments)     " "Colon inflamation    Temazepam Other (See Comments)     Balance problem,s    Trazodone Unknown     Tongue swelling; anxiety    Alendronate [Alendronate] Unknown and Other (See Comments)     \"constipated\", \"constipated\"    Flonase [Fluticasone] Rash    Metoprolol Succinate [Metoprolol] Rash    Simvastatin Rash    Sulfa (Sulfonamide Antibiotics) [Sulfa Antibiotics] Rash       FAMILY HISTORY:  History reviewed. No pertinent family history.    SOCIAL HISTORY:   Social History     Socioeconomic History    Marital status:    Tobacco Use    Smoking status: Former    Smokeless tobacco: Never   Substance and Sexual Activity    Alcohol use: Not Currently     Alcohol/week: 7.0 standard drinks of alcohol    Drug use: Never       VITALS:  Patient Vitals for the past 24 hrs:   BP Temp Temp src Pulse Resp SpO2 Height Weight   11/26/23 1306 -- -- -- 80 19 99 % -- --   11/26/23 1304 139/60 98.1  F (36.7  C) Oral -- -- -- 1.626 m (5' 4\") 59.5 kg (131 lb 3.2 oz)        PHYSICAL EXAM    Constitutional:  Awake, alert, in no apparent distress  HENT:  Normocephalic, Atraumatic. Bilateral external ears normal. Oropharynx moist. Nose normal. Neck- Normal range of motion with no guarding, No midline cervical tenderness, Supple, No stridor.   Eyes:  PERRL, EOMI with no signs of entrapment, bilateral injected conjunctiva, small area of erythema over the right medial eye consistent with some conjunctival hemorrhage, no discharge, drainage, no signs of traumatic hyphema or hypopyon.  Respiratory:  Normal breath sounds, No respiratory distress, No wheezing.    Cardiovascular:  Normal heart rate, Normal rhythm, No appreciable rubs or gallops.   GI:  Soft, No tenderness, No distension, No palpable masses  Musculoskeletal:  Intact distal pulses, No edema. Good range of motion in all major joints. No tenderness to palpation or major deformities noted.  Integument:  Warm, Dry, No erythema, No rash.   Neurologic:  Alert & oriented, Normal motor " function, Normal sensory function, No focal deficits noted.   Psychiatric:  Affect normal, Judgment normal, Mood normal.     LAB:  All pertinent labs reviewed and interpreted.       RADIOLOGY:  No orders to display          EKG:      I have independently reviewed and interpreted the EKG(s) documented above.    PROCEDURES:   N/A      [employer] [facility] Documentation:       I, Chelsey Modi, am serving as a scribe to document services personally performed by Darion Joyce MD, based on my observation and the provider's statements to me. I, Darion Joyce MD attest that Chelsey Modi is acting in a scribe capacity, has observed my performance of the services and has documented them in accordance with my direction.    Darion Joyce M.D.  Emergency Medicine  [facility] EMERGENCY DEPARTMENT  [address] [place] [id number]  [phone]  Dept: [phone]     Darion Joyce MD  11/26/23 [id number]

## 2023-11-26 NOTE — TELEPHONE ENCOUNTER
"Released from Hospital Friday /[place], Had sepsis, hematuria, cough. Is reporting Red sclera, has been coughing. Eyes also hurt. Looks like broken blood vessels, blood starting to pool. Eye sensitivity present. Pain level is \" 5\". Is getting a Headache when eyes open.     Protocol reviewed, Dispostion: to ED now, Patient will return to [place] ED,  to drive. Call back with worsening symptoms, further questions/concerns.     SANCHEZ NICE RN  [facility] nurse advisors  11/26/2023  12:16 PM    Secondly sent home to take salt in next 2 days. Was low on Potassium and wondering if she needs supplement.     Also watching intake on water, wondering if this needs to be continued?   Reason for Disposition   Severe eye pain    Additional Information   Negative: Followed an eye injury   Negative: Eye pain from chemical in the eye   Negative: Eye pain from foreign body in eye   Negative: [1] Tender, red lump or pimple AND [2] located along the eyelid margin   Negative: Has sinus pain or pressure    Protocols used: Eye Pain-A-AH    "

## 2023-11-27 ENCOUNTER — PATIENT OUTREACH (OUTPATIENT)
Dept: CARE COORDINATION | Facility: CLINIC | Age: 83
End: 2023-11-27
Payer: COMMERCIAL

## 2023-11-27 LAB
BACTERIA BLD CULT: NO GROWTH
BACTERIA BLD CULT: NO GROWTH

## 2023-11-27 NOTE — PROGRESS NOTES
Connected Care Resource Center Contact  [facility]/Voicemail     Clinical Data: Post-Discharge Outreach     Outreach attempted x 2.  Left message on patient's voicemail, providing [facility]'s central phone number of [phone] ([phone]) for questions/concerns and/or to schedule an appt with an [facility] provider, if they do not have a PCP.      Plan:  General acute hospital will do no further outreaches at this time.       Zuly Guerrero RN  Connected [facility] Resource Center, [facility]    *Connected Care Resource Team does NOT follow patient ongoing. Referrals are identified based on internal discharge reports and the outreach is to ensure patient has an understanding of their discharge instructions.

## 2023-11-30 ENCOUNTER — NURSE TRIAGE (OUTPATIENT)
Dept: NURSING | Facility: CLINIC | Age: 83
End: 2023-11-30
Payer: COMMERCIAL

## 2023-11-30 NOTE — TELEPHONE ENCOUNTER
Nurse Triage SBAR    Is this a 2nd Level Triage? YES, LICENSED PRACTITIONER REVIEW IS REQUIRED    Situation: Pt calling wondering what she can do about her cough.    Background: Pt has been seen 3 times since October for a cough which she states started with COVID.    Assessment: Pt continue to have a dry cough. She has tried Halls and Cepacol lozenges without relief as well as hot, steamy showers. She has also had low grade fevers intermittently for 'the last few days'. Denies CP and SOB.    Protocol Recommended Disposition:   See in Office Today    Recommendation: Encouraged pt to try to get in with her PCP or go to . She had an appointment with them yesterday. She was transferred to another RN who had been trying to contact her earlier today.    Reason for Disposition   Fever present > 3 days (72 hours)    Additional Information   Negative: SEVERE difficulty breathing (e.g., struggling for each breath, speaks in single words)   Negative: Coughing started suddenly after medicine, an allergic food or bee sting   Negative: Rapid onset of cough and has hives   Negative: Difficulty breathing after exposure to flames, smoke, or fumes   Negative: Bluish (or gray) lips or face   Negative: Sounds like a life-threatening emergency to the triager   Negative: MODERATE difficulty breathing (e.g., speaks in phrases, SOB even at rest, pulse 100-120) and still present when not coughing   Negative: Chest pain present when not coughing   Negative: Passed out (i.e., fainted, collapsed and was not responding)   Negative: Patient sounds very sick or weak to the triager   Negative: MILD difficulty breathing (e.g., minimal/no SOB at rest, SOB with walking, pulse <100) and still present when not coughing   Negative: Fever > 103 F (39.4 C)   Negative: Fever > 101 F (38.3 C) and over 60 years of age   Negative: Fever > 100.0 F (37.8 C) and has diabetes mellitus or a weak immune system (e.g., HIV positive, cancer chemotherapy, organ  transplant, splenectomy, chronic steroids)   Negative: Coughed up > 1 tablespoon (15 ml) blood (Exception: Blood-tinged sputum.)   Negative: Fever > 100.0 F (37.8 C) and bedridden (e.g., CVA, chronic illness, recovering from surgery)   Negative: Increasing ankle swelling   Negative: Wheezing is present    Protocols used: Cough-A-OH    Renu Summers RN  [facility] Nurse Advisor   11/30/2023  2:02 PM

## 2023-11-30 NOTE — PROGRESS NOTES
Received staff message asking for call to patient. Called patient. No answer no machine. Patient has phone number to 24/7 triage phone number. No further outreach attempts to be made by RN CC.     Sahara Guerrero, RN  [facility]

## 2023-12-04 ENCOUNTER — MYC MEDICAL ADVICE (OUTPATIENT)
Dept: RHEUMATOLOGY | Facility: CLINIC | Age: 83
End: 2023-12-04
Payer: COMMERCIAL

## 2024-01-18 ENCOUNTER — HOSPITAL ENCOUNTER (INPATIENT)
Facility: HOSPITAL | Age: 84
LOS: 1 days | Discharge: HOME HEALTH | DRG: 064 | End: 2024-01-19
Attending: EMERGENCY MEDICINE | Admitting: STUDENT IN AN ORGANIZED HEALTH CARE EDUCATION/TRAINING PROGRAM
Payer: COMMERCIAL

## 2024-01-18 ENCOUNTER — APPOINTMENT (OUTPATIENT)
Dept: CT IMAGING | Facility: HOSPITAL | Age: 84
DRG: 064 | End: 2024-01-18
Attending: STUDENT IN AN ORGANIZED HEALTH CARE EDUCATION/TRAINING PROGRAM
Payer: COMMERCIAL

## 2024-01-18 ENCOUNTER — APPOINTMENT (OUTPATIENT)
Dept: MRI IMAGING | Facility: HOSPITAL | Age: 84
DRG: 064 | End: 2024-01-18
Attending: EMERGENCY MEDICINE
Payer: COMMERCIAL

## 2024-01-18 ENCOUNTER — APPOINTMENT (OUTPATIENT)
Dept: CARDIOLOGY | Facility: HOSPITAL | Age: 84
DRG: 064 | End: 2024-01-18
Attending: STUDENT IN AN ORGANIZED HEALTH CARE EDUCATION/TRAINING PROGRAM
Payer: COMMERCIAL

## 2024-01-18 DIAGNOSIS — I63.9 CEREBROVASCULAR ACCIDENT (CVA), UNSPECIFIED MECHANISM (H): Primary | ICD-10-CM

## 2024-01-18 DIAGNOSIS — I63.9 CEREBROVASCULAR ACCIDENT (CVA), UNSPECIFIED MECHANISM (H): ICD-10-CM

## 2024-01-18 LAB
ANION GAP SERPL CALCULATED.3IONS-SCNC: 5 MMOL/L (ref 7–15)
APTT PPP: 27 SECONDS (ref 22–38)
BASOPHILS # BLD AUTO: 0 10E3/UL (ref 0–0.2)
BASOPHILS NFR BLD AUTO: 0 %
BUN SERPL-MCNC: 13.1 MG/DL (ref 8–23)
CALCIUM SERPL-MCNC: 9.3 MG/DL (ref 8.8–10.2)
CHLORIDE SERPL-SCNC: 99 MMOL/L (ref 98–107)
CHOLEST SERPL-MCNC: 178 MG/DL
CREAT SERPL-MCNC: 0.6 MG/DL (ref 0.51–0.95)
DEPRECATED HCO3 PLAS-SCNC: 32 MMOL/L (ref 22–29)
EGFRCR SERPLBLD CKD-EPI 2021: 88 ML/MIN/1.73M2
EOSINOPHIL # BLD AUTO: 0.1 10E3/UL (ref 0–0.7)
EOSINOPHIL NFR BLD AUTO: 2 %
ERYTHROCYTE [DISTWIDTH] IN BLOOD BY AUTOMATED COUNT: 16.5 % (ref 10–15)
GLUCOSE BLDC GLUCOMTR-MCNC: 93 MG/DL (ref 70–99)
GLUCOSE BLDC GLUCOMTR-MCNC: 94 MG/DL (ref 70–99)
GLUCOSE SERPL-MCNC: 107 MG/DL (ref 70–99)
HBA1C MFR BLD: 5 %
HCT VFR BLD AUTO: 30.7 % (ref 35–47)
HDLC SERPL-MCNC: 88 MG/DL
HGB BLD-MCNC: 10.1 G/DL (ref 11.7–15.7)
IMM GRANULOCYTES # BLD: 0 10E3/UL
IMM GRANULOCYTES NFR BLD: 1 %
INR PPP: 1.05 (ref 0.85–1.15)
IRON BINDING CAPACITY (ROCHE): 251 UG/DL (ref 240–430)
IRON SATN MFR SERPL: 23 % (ref 15–46)
IRON SERPL-MCNC: 57 UG/DL (ref 37–145)
LDLC SERPL CALC-MCNC: 76 MG/DL
LVEF ECHO: NORMAL
LYMPHOCYTES # BLD AUTO: 1.6 10E3/UL (ref 0.8–5.3)
LYMPHOCYTES NFR BLD AUTO: 22 %
MCH RBC QN AUTO: 28.3 PG (ref 26.5–33)
MCHC RBC AUTO-ENTMCNC: 32.9 G/DL (ref 31.5–36.5)
MCV RBC AUTO: 86 FL (ref 78–100)
MONOCYTES # BLD AUTO: 0.6 10E3/UL (ref 0–1.3)
MONOCYTES NFR BLD AUTO: 8 %
NEUTROPHILS # BLD AUTO: 5.1 10E3/UL (ref 1.6–8.3)
NEUTROPHILS NFR BLD AUTO: 67 %
NONHDLC SERPL-MCNC: 90 MG/DL
NRBC # BLD AUTO: 0 10E3/UL
NRBC BLD AUTO-RTO: 0 /100
PLATELET # BLD AUTO: 237 10E3/UL (ref 150–450)
POTASSIUM SERPL-SCNC: 3.4 MMOL/L (ref 3.4–5.3)
RBC # BLD AUTO: 3.57 10E6/UL (ref 3.8–5.2)
RETICS # AUTO: 0.05 10E6/UL (ref 0.01–0.11)
RETICS/RBC NFR AUTO: 1.5 % (ref 0.8–2.7)
SODIUM SERPL-SCNC: 136 MMOL/L (ref 135–145)
TRIGL SERPL-MCNC: 71 MG/DL
TROPONIN T SERPL HS-MCNC: 14 NG/L
TROPONIN T SERPL HS-MCNC: 15 NG/L
TROPONIN T SERPL HS-MCNC: 15 NG/L
WBC # BLD AUTO: 7.4 10E3/UL (ref 4–11)

## 2024-01-18 PROCEDURE — 250N000013 HC RX MED GY IP 250 OP 250 PS 637: Performed by: STUDENT IN AN ORGANIZED HEALTH CARE EDUCATION/TRAINING PROGRAM

## 2024-01-18 PROCEDURE — 85610 PROTHROMBIN TIME: CPT | Performed by: EMERGENCY MEDICINE

## 2024-01-18 PROCEDURE — A9585 GADOBUTROL INJECTION: HCPCS | Performed by: EMERGENCY MEDICINE

## 2024-01-18 PROCEDURE — 85045 AUTOMATED RETICULOCYTE COUNT: CPT | Performed by: STUDENT IN AN ORGANIZED HEALTH CARE EDUCATION/TRAINING PROGRAM

## 2024-01-18 PROCEDURE — 255N000002 HC RX 255 OP 636: Performed by: EMERGENCY MEDICINE

## 2024-01-18 PROCEDURE — 120N000001 HC R&B MED SURG/OB

## 2024-01-18 PROCEDURE — 999N000208 ECHOCARDIOGRAM COMPLETE

## 2024-01-18 PROCEDURE — 82962 GLUCOSE BLOOD TEST: CPT

## 2024-01-18 PROCEDURE — 99285 EMERGENCY DEPT VISIT HI MDM: CPT | Mod: 25

## 2024-01-18 PROCEDURE — 83036 HEMOGLOBIN GLYCOSYLATED A1C: CPT | Performed by: STUDENT IN AN ORGANIZED HEALTH CARE EDUCATION/TRAINING PROGRAM

## 2024-01-18 PROCEDURE — 82728 ASSAY OF FERRITIN: CPT | Performed by: STUDENT IN AN ORGANIZED HEALTH CARE EDUCATION/TRAINING PROGRAM

## 2024-01-18 PROCEDURE — 82607 VITAMIN B-12: CPT | Performed by: STUDENT IN AN ORGANIZED HEALTH CARE EDUCATION/TRAINING PROGRAM

## 2024-01-18 PROCEDURE — 250N000013 HC RX MED GY IP 250 OP 250 PS 637: Performed by: EMERGENCY MEDICINE

## 2024-01-18 PROCEDURE — 36415 COLL VENOUS BLD VENIPUNCTURE: CPT | Performed by: EMERGENCY MEDICINE

## 2024-01-18 PROCEDURE — 83550 IRON BINDING TEST: CPT | Performed by: STUDENT IN AN ORGANIZED HEALTH CARE EDUCATION/TRAINING PROGRAM

## 2024-01-18 PROCEDURE — 80048 BASIC METABOLIC PNL TOTAL CA: CPT | Performed by: EMERGENCY MEDICINE

## 2024-01-18 PROCEDURE — 70553 MRI BRAIN STEM W/O & W/DYE: CPT

## 2024-01-18 PROCEDURE — 93306 TTE W/DOPPLER COMPLETE: CPT | Mod: 26 | Performed by: INTERNAL MEDICINE

## 2024-01-18 PROCEDURE — 85025 COMPLETE CBC W/AUTO DIFF WBC: CPT | Performed by: EMERGENCY MEDICINE

## 2024-01-18 PROCEDURE — 250N000011 HC RX IP 250 OP 636: Performed by: EMERGENCY MEDICINE

## 2024-01-18 PROCEDURE — 36415 COLL VENOUS BLD VENIPUNCTURE: CPT | Performed by: STUDENT IN AN ORGANIZED HEALTH CARE EDUCATION/TRAINING PROGRAM

## 2024-01-18 PROCEDURE — 83540 ASSAY OF IRON: CPT | Performed by: STUDENT IN AN ORGANIZED HEALTH CARE EDUCATION/TRAINING PROGRAM

## 2024-01-18 PROCEDURE — 93005 ELECTROCARDIOGRAM TRACING: CPT | Performed by: EMERGENCY MEDICINE

## 2024-01-18 PROCEDURE — 0042T CT HEAD PERFUSION W CONTRAST: CPT

## 2024-01-18 PROCEDURE — 99223 1ST HOSP IP/OBS HIGH 75: CPT | Performed by: STUDENT IN AN ORGANIZED HEALTH CARE EDUCATION/TRAINING PROGRAM

## 2024-01-18 PROCEDURE — 82465 ASSAY BLD/SERUM CHOLESTEROL: CPT | Performed by: STUDENT IN AN ORGANIZED HEALTH CARE EDUCATION/TRAINING PROGRAM

## 2024-01-18 PROCEDURE — 70496 CT ANGIOGRAPHY HEAD: CPT

## 2024-01-18 PROCEDURE — 84484 ASSAY OF TROPONIN QUANT: CPT | Performed by: STUDENT IN AN ORGANIZED HEALTH CARE EDUCATION/TRAINING PROGRAM

## 2024-01-18 PROCEDURE — 258N000003 HC RX IP 258 OP 636: Performed by: STUDENT IN AN ORGANIZED HEALTH CARE EDUCATION/TRAINING PROGRAM

## 2024-01-18 PROCEDURE — 99207 PR NO CHARGE LOS: CPT | Performed by: PHYSICIAN ASSISTANT

## 2024-01-18 PROCEDURE — 85730 THROMBOPLASTIN TIME PARTIAL: CPT | Performed by: EMERGENCY MEDICINE

## 2024-01-18 PROCEDURE — 84484 ASSAY OF TROPONIN QUANT: CPT | Performed by: EMERGENCY MEDICINE

## 2024-01-18 RX ORDER — ONDANSETRON 2 MG/ML
4 INJECTION INTRAMUSCULAR; INTRAVENOUS EVERY 6 HOURS PRN
Status: DISCONTINUED | OUTPATIENT
Start: 2024-01-18 | End: 2024-01-19 | Stop reason: HOSPADM

## 2024-01-18 RX ORDER — ATORVASTATIN CALCIUM 40 MG/1
40 TABLET, FILM COATED ORAL EVERY EVENING
Status: DISCONTINUED | OUTPATIENT
Start: 2024-01-18 | End: 2024-01-19 | Stop reason: HOSPADM

## 2024-01-18 RX ORDER — IOPAMIDOL 755 MG/ML
50 INJECTION, SOLUTION INTRAVASCULAR ONCE
Status: COMPLETED | OUTPATIENT
Start: 2024-01-18 | End: 2024-01-18

## 2024-01-18 RX ORDER — CLOPIDOGREL BISULFATE 75 MG/1
300 TABLET ORAL ONCE
Status: COMPLETED | OUTPATIENT
Start: 2024-01-18 | End: 2024-01-18

## 2024-01-18 RX ORDER — CARBOXYMETHYLCELLULOSE SODIUM 5 MG/ML
1 SOLUTION/ DROPS OPHTHALMIC 4 TIMES DAILY PRN
COMMUNITY

## 2024-01-18 RX ORDER — POLYETHYLENE GLYCOL 3350 17 G/17G
1 POWDER, FOR SOLUTION ORAL EVERY MORNING
COMMUNITY

## 2024-01-18 RX ORDER — LEVOTHYROXINE SODIUM 88 UG/1
88 TABLET ORAL EVERY MORNING
Status: DISCONTINUED | OUTPATIENT
Start: 2024-01-19 | End: 2024-01-19 | Stop reason: HOSPADM

## 2024-01-18 RX ORDER — CLOPIDOGREL BISULFATE 75 MG/1
75 TABLET ORAL DAILY
Status: DISCONTINUED | OUTPATIENT
Start: 2024-01-19 | End: 2024-01-19 | Stop reason: HOSPADM

## 2024-01-18 RX ORDER — PREDNISONE 10 MG/1
5 TABLET ORAL EVERY MORNING
COMMUNITY
Start: 2024-01-16

## 2024-01-18 RX ORDER — ASPIRIN 81 MG/1
81 TABLET ORAL DAILY
Status: DISCONTINUED | OUTPATIENT
Start: 2024-01-19 | End: 2024-01-19 | Stop reason: HOSPADM

## 2024-01-18 RX ORDER — ASPIRIN 81 MG/1
81 TABLET, CHEWABLE ORAL DAILY
Status: DISCONTINUED | OUTPATIENT
Start: 2024-01-19 | End: 2024-01-19 | Stop reason: HOSPADM

## 2024-01-18 RX ORDER — SODIUM CHLORIDE 9 MG/ML
INJECTION, SOLUTION INTRAVENOUS CONTINUOUS
Status: ACTIVE | OUTPATIENT
Start: 2024-01-18 | End: 2024-01-19

## 2024-01-18 RX ORDER — ASPIRIN 325 MG
325 TABLET ORAL ONCE
Status: COMPLETED | OUTPATIENT
Start: 2024-01-18 | End: 2024-01-18

## 2024-01-18 RX ORDER — GADOBUTROL 604.72 MG/ML
6 INJECTION INTRAVENOUS ONCE
Status: COMPLETED | OUTPATIENT
Start: 2024-01-18 | End: 2024-01-18

## 2024-01-18 RX ORDER — ALBUTEROL SULFATE 90 UG/1
1 AEROSOL, METERED RESPIRATORY (INHALATION) EVERY 4 HOURS PRN
COMMUNITY
Start: 2023-10-21

## 2024-01-18 RX ORDER — ONDANSETRON 4 MG/1
4 TABLET, ORALLY DISINTEGRATING ORAL EVERY 6 HOURS PRN
Status: DISCONTINUED | OUTPATIENT
Start: 2024-01-18 | End: 2024-01-19 | Stop reason: HOSPADM

## 2024-01-18 RX ORDER — ISOSORBIDE MONONITRATE 30 MG/1
30 TABLET, EXTENDED RELEASE ORAL EVERY EVENING
Status: DISCONTINUED | OUTPATIENT
Start: 2024-01-18 | End: 2024-01-18

## 2024-01-18 RX ORDER — LIDOCAINE 40 MG/G
CREAM TOPICAL
Status: DISCONTINUED | OUTPATIENT
Start: 2024-01-18 | End: 2024-01-19 | Stop reason: HOSPADM

## 2024-01-18 RX ORDER — ASPIRIN 81 MG/1
81 TABLET, CHEWABLE ORAL EVERY EVENING
COMMUNITY

## 2024-01-18 RX ORDER — PREDNISONE 5 MG/1
5 TABLET ORAL EVERY MORNING
Status: DISCONTINUED | OUTPATIENT
Start: 2024-01-19 | End: 2024-01-19 | Stop reason: HOSPADM

## 2024-01-18 RX ORDER — CALCIUM CARBONATE/VITAMIN D3 600 MG-10
1 TABLET ORAL
COMMUNITY

## 2024-01-18 RX ORDER — IOPAMIDOL 755 MG/ML
75 INJECTION, SOLUTION INTRAVASCULAR ONCE
Status: COMPLETED | OUTPATIENT
Start: 2024-01-18 | End: 2024-01-18

## 2024-01-18 RX ADMIN — SODIUM CHLORIDE: 9 INJECTION, SOLUTION INTRAVENOUS at 16:40

## 2024-01-18 RX ADMIN — ASPIRIN 325 MG ORAL TABLET 325 MG: 325 PILL ORAL at 14:26

## 2024-01-18 RX ADMIN — ATORVASTATIN CALCIUM 40 MG: 40 TABLET, FILM COATED ORAL at 21:11

## 2024-01-18 RX ADMIN — SODIUM CHLORIDE TAB 1 GM 0.5 G: 1 TAB at 19:23

## 2024-01-18 RX ADMIN — CLOPIDOGREL BISULFATE 300 MG: 75 TABLET ORAL at 14:25

## 2024-01-18 RX ADMIN — IOPAMIDOL 75 ML: 755 INJECTION, SOLUTION INTRAVENOUS at 11:56

## 2024-01-18 RX ADMIN — IOPAMIDOL 50 ML: 755 INJECTION, SOLUTION INTRAVENOUS at 12:06

## 2024-01-18 RX ADMIN — GADOBUTROL 6 ML: 604.72 INJECTION INTRAVENOUS at 13:35

## 2024-01-18 ASSESSMENT — ACTIVITIES OF DAILY LIVING (ADL)
DEPENDENT_IADLS:: INDEPENDENT
ADLS_ACUITY_SCORE: 24
ADLS_ACUITY_SCORE: 24
ADLS_ACUITY_SCORE: 35
ADLS_ACUITY_SCORE: 24

## 2024-01-18 NOTE — CONSULTS
"  [facility]    Stroke Telephone Note    I was called by Geoffrey Thorne on 01/18/24 regarding patient Jessica Cruz. The patient is a 83 year old female who presented to the ER with facial droop and confusion and slurred speech since yesterday.      Vitals  BP: (!) 162/73   Pulse: 86   Resp: 12   Temp: 97.5  F (36.4  C)        Stroke Code Data (for stroke code without tele)  Stroke code activated 01/18/24  1138   Stroke provider first response 01/18/24  1139   Last known normal 01/17/24     Unknown   Time of discovery (or onset of symptoms) 01/17/24      Head CT read by Stroke Neuro Provider 01/18/24  1144   Was stroke code de-escalated? Yes  01/18/24  1159     Imaging Findings  CT head: unremarkable  CTA head/neck: no LVO  CTP head: No perfusion defect    Intravenous Thrombolysis  Not given due to:   - unclear or unfavorable risk-benefit profile for extended window thrombolysis beyond the conventional 4.5 hour time window    Endovascular Treatment  Not initiated due to absence of proximal vessel occlusion    Impression  Subacute neurologic symptoms of facial droop and dysarthria presumably due to acute stroke  however the differential includes toxic/metabolic etiologies as well    Recommendations   - Check brain MRI and call us back for further recommendations if stroke seen    My recommendations are based on the information provided over the phone by Jessica Cruz's in-person providers. They are not intended to replace the clinical judgment of her in-person providers. I was not requested to personally see or examine the patient at this time.     Brunilda Nixon PA-C  Vascular Neurology    To page me or covering stroke neurology team member, click here: AMCOM  Choose \"On Call\" tab at top, then select \"NEUROLOGY/ALL SITES\" from middle drop-down box, press Enter, then look for \"stroke\" or \"telestroke\" for your site.    "

## 2024-01-18 NOTE — ED NOTES
[facility] ED Handoff Report    ED Chief Complaint: facial droop    ED Diagnosis:  (I63.9) Cerebrovascular accident (CVA), unspecified mechanism (H)  Comment:   Plan:        PMH:    Past Medical History:   Diagnosis Date    Aortic regurgitation     Colon cancer (H)     Coronary artery disease     previous PCI    Diverticulosis     History of anesthesia complications     Hyperlipidemia     PONV (postoperative nausea and vomiting)         Code Status:  Prior     Falls Risk: Yes Band: Applied    Current Living Situation/Residence: lives with a significant other     Elimination Status: Continent: Yes     Activity Level: SBA w/ walker    Patients Preferred Language:  English     Needed: No    Vital Signs:  BP (!) 154/69   Pulse 64   Temp 97.5  F (36.4  C) (Temporal)   Resp 22   SpO2 97%      Cardiac Rhythm: Sinus Rhythm    Pain Score: 0/10    Is the Patient Confused:  No    Last Food or Drink: 01/18/24     Focused Assessment:  Patient presented to ED with right sided facial droop. After MRI, patient found to have CVA. Plavix and ASA administered. NS running at 75 mls/hr. Patient is alert and oriented. Patient passed swallow screening without difficulty.     Tests Performed: Done: Labs and Imaging    Treatments Provided:  IV, labs, imaging meds    Family Dynamics/Concerns: No    Family Updated On Visitor Policy: Yes    Plan of Care Communicated to Family: Yes    Who Was Updated about Plan of Care:       Medications sent with patient: N/A    Covid: asymptomatic     RN: Blaire Quintanilla RN   1/18/2024 4:35 PM

## 2024-01-18 NOTE — ED PROVIDER NOTES
"  Emergency Department Encounter     Evaluation Date & Time:   1/18/2024 11:42 AM    CHIEF COMPLAINT:  Facial Droop      Triage Note:The patient presents with her  from home, the patients  states that she has had confusion 1/17/2024,  states she had slurred speech 1/17\2024. Pt went to have her hair done today and the  noted that she had a new facial droop. It is unsure how long the facial droop has been present. BS is 93, Dr. Thorne in triage.               ED COURSE & MEDICAL DECISION MAKING:     I was called to see pt in triage for neuro assessment.  Pt with a history of vasculitis and HLD, here with  for evaluation of new onset of symptoms yesterday afternoon some time.   noticed speech was \"off\", possibly thicker, but attributed it to possible sinus congestion, which she gets.  Pt did have a right sided HA yesterday as well.  Speech abnormality continued today. They went to get hair done and stylist noticed right side of face was droopy, wondered if it was Bell's Palsy.  Pt arrives now as a result. She has obvious dysarthria and some right facial droop. Made Tier 2 due to speech deficit, but may not qualify with disabling deficit for Tier 2. Will discuss with stroke team.     ED Course as of 01/18/24 1511   Thu Jan 18, 2024   1151 I spoke with stroke neurology team and they stated that since NIHSS is technically below 5, we can cancel stroke code when she returns from CTA.  Recommend MRI after this and hospitalization.     1207 CTA head/neck neg for acute pathology. Will proceed with MRI brain.   1227 CBC baseline with chronic anemia.   1356 Radiology called to inform me of tiny stroke seen on MRI in left corona radiata.  Could be source of her symptoms.  I'm paging stroke neuro for further recs regarding antiplatelet therapy.  Pt updated on results, plan for hospitalization.     1403 I spoke again with stroke neurology who recommends full strength aspirin and " plavix 300mg today.  Usual stroke cares after that.         Medical Decision Making    History:  Supplemental history from: Family Member/Significant Other  External Record(s) reviewed: Documented in chart    Work Up:  Chart documentation includes differential considered and any EKGs or imaging independently interpreted by provider, where specified.  In additional to work up documented, I considered the following work up: Documented in chart, if applicable.    External consultation:  Discussion of management with another provider: Neurology    Complicating factors:  Care impacted by chronic illness: Hyperlipidemia and Other: Vasculitis  Care affected by social determinants of health: N/A    Disposition considerations: Admit.      At the conclusion of the encounter I discussed the results of all the tests and the disposition. The questions were answered. The patient or family acknowledged understanding and was agreeable with the care plan.      MEDICATIONS GIVEN IN THE EMERGENCY DEPARTMENT:  Medications   iopamidol (ISOVUE-370) solution 75 mL (75 mLs Intravenous $Given 1/18/24 1156)   iopamidol (ISOVUE-370) solution 50 mL (50 mLs Intravenous $Given 1/18/24 1206)   gadobutrol (GADAVIST) injection 6 mL (6 mLs Intravenous $Given 1/18/24 1335)   clopidogrel (PLAVIX) tablet 300 mg (300 mg Oral $Given 1/18/24 1425)   aspirin (ASA) tablet 325 mg (325 mg Oral $Given 1/18/24 1426)       NEW PRESCRIPTIONS STARTED AT TODAY'S ED VISIT:  New Prescriptions    No medications on file       HPI   HPI     Jessica Cruz is a 83 year old female with a pertinent history of vasculitis and HLD who presents to this ED via walk-in with  for evaluation of new onset speech changes first noticed yesterday afternoon by .  He reports her speech sounded off and/or thick, but thought it might be related to sinus congestion.  Pt with right sided HA yesterday, but gone now.  Speech remained abnormal today and they went to get her hair  done.  Stylist noticed her right side of face seemed droopy and mentioned to patient, who came in now.  Pt denies difficulty walking, lateralizing weakness/numbness.  No prior history of stroke or arrhythmia.  Pt using a crutch to walk, which she sometimes needs per the .    REVIEW OF SYSTEMS:  See HPI      Medical History     Past Medical History:   Diagnosis Date    Aortic regurgitation     Colon cancer (H)     Coronary artery disease     Diverticulosis     History of anesthesia complications     Hyperlipidemia     PONV (postoperative nausea and vomiting)        Past Surgical History:   Procedure Laterality Date    ANGIOPLASTY      ZZC PARTIAL HIP REPLACEMENT Left 6/2/2020    Procedure: HEMIARTHROPLASTY, HIP, BIPOLAR LEFT;  Surgeon: Jorge Luis Aceves MD;  Location: [facility];  Service: Orthopedics       No family history on file.    Social History     Tobacco Use    Smoking status: Former    Smokeless tobacco: Never   Substance Use Topics    Alcohol use: Not Currently     Alcohol/week: 7.0 standard drinks of alcohol    Drug use: Never       acetaminophen (TYLENOL) 325 MG tablet  albuterol (PROAIR HFA/PROVENTIL HFA/VENTOLIN HFA) 108 (90 Base) MCG/ACT inhaler  aspirin (ASA) 81 MG chewable tablet  atorvastatin (LIPITOR) 10 MG tablet  calcium carbonate-vitamin D (CALTRATE) 600-10 MG-MCG per tablet  carboxymethylcellulose PF (REFRESH PLUS) 0.5 % ophthalmic solution  cholecalciferol 50 MCG (2000 UT) tablet  coenzyme Q10 (CO Q-10) 100 mg capsule  fish oil-omega-3 fatty acids 1000 MG capsule  isosorbide mononitrate (IMDUR) 30 MG 24 hr tablet  Lactobacillus rhamnosus GG (CULTURELLE) 10-15 Billion cell capsule  levothyroxine (SYNTHROID, LEVOTHROID) 88 MCG tablet  multivitamin therapeutic tablet  polyethylene glycol (MIRALAX) 17 g packet  polyethylene glycol-propylene glycol (SYSTANE ULTRA) 0.4-0.3 % SOLN ophthalmic solution  predniSONE (DELTASONE) 10 MG tablet  psyllium (METAMUCIL) 3.4 gram packet  sodium  chloride 1 GM tablet  nitroglycerin (NITROSTAT) 0.4 MG SL tablet        Physical Exam     Vitals:  BP (!) 159/71   Pulse 68   Temp 97.5  F (36.4  C) (Temporal)   Resp 16   SpO2 (!) 89%     PHYSICAL EXAM:   Physical Exam  Vitals and nursing note reviewed.   Constitutional:       General: She is not in acute distress.     Appearance: Normal appearance.   HENT:      Head: Normocephalic and atraumatic.      Mouth/Throat:      Mouth: Mucous membranes are moist.   Eyes:      Extraocular Movements: Extraocular movements intact.      Pupils: Pupils are equal, round, and reactive to light.      Comments: No vertical or bidirectional nystagmus   Cardiovascular:      Rate and Rhythm: Normal rate and regular rhythm.   Pulmonary:      Effort: Pulmonary effort is normal. No respiratory distress.      Breath sounds: Normal breath sounds.   Abdominal:      General: There is no distension.      Palpations: Abdomen is soft.      Tenderness: There is no abdominal tenderness.   Musculoskeletal:         General: Normal range of motion.      Cervical back: Normal range of motion.   Skin:     General: Skin is warm.      Capillary Refill: Capillary refill takes less than 2 seconds.   Neurological:      Mental Status: She is alert and oriented to person, place, and time.      Cranial Nerves: Dysarthria and facial asymmetry present.      Sensory: Sensation is intact.      Motor: Motor function is intact.      Gait: Gait is intact.      Comments: +Slightly thickened/dysarthric speech, + right facial droop, 5/5 strength b/l UE/LE, normal finger to nose b/l       [employer] Stroke Scale  Exam Interval: Baseline   Score    Level of consciousness: (0)   Alert, keenly responsive    LOC questions: (0)   Answers both questions correctly    LOC commands: (0)   Performs both tasks correctly    Best gaze: (0)   Normal    Visual: (0)   No visual loss    Facial palsy: (2)   Partial paralysis (total/near total of lower face)     Motor arm (left): (0)   No drift    Motor arm (right): (0)   No drift    Motor leg (left): (0)   No drift    Motor leg (right): (0)   No drift    Limb ataxia: (0)   Absent    Sensory: (0)   Normal- no sensory loss    Best language: (0)   Normal- no aphasia    Dysarthria: (1)   Mild to moderate dysarthria    Extinction and inattention: (0)   No abnormality        Total Score:  3         Results     LAB:  All pertinent labs reviewed and interpreted  Labs Ordered and Resulted from Time of ED Arrival to Time of ED Departure   BASIC METABOLIC PANEL - Abnormal       Result Value    Sodium 136      Potassium 3.4      Chloride 99      Carbon Dioxide (CO2) 32 (*)     Anion Gap 5 (*)     Urea Nitrogen 13.1      Creatinine 0.60      GFR Estimate 88      Calcium 9.3      Glucose 107 (*)    TROPONIN T, HIGH SENSITIVITY - Abnormal    Troponin T, High Sensitivity 15 (*)    CBC WITH PLATELETS AND DIFFERENTIAL - Abnormal    WBC Count 7.4      RBC Count 3.57 (*)     Hemoglobin 10.1 (*)     Hematocrit 30.7 (*)     MCV 86      MCH 28.3      MCHC 32.9      RDW 16.5 (*)     Platelet Count 237      % Neutrophils 67      % Lymphocytes 22      % Monocytes 8      % Eosinophils 2      % Basophils 0      % Immature Granulocytes 1      NRBCs per 100 WBC 0      Absolute Neutrophils 5.1      Absolute Lymphocytes 1.6      Absolute Monocytes 0.6      Absolute Eosinophils 0.1      Absolute Basophils 0.0      Absolute Immature Granulocytes 0.0      Absolute NRBCs 0.0     TROPONIN T, HIGH SENSITIVITY - Abnormal    Troponin T, High Sensitivity 15 (*)    INR - Normal    INR 1.05     PARTIAL THROMBOPLASTIN TIME - Normal    aPTT 27     GLUCOSE BY METER - Normal    GLUCOSE BY METER POCT 93     GLUCOSE MONITOR NURSING POCT       RADIOLOGY:  MR Brain w/o & w Contrast   Final Result   IMPRESSION:      1.  Findings suspicious for a focus of acute ischemic change involving the left posterior corona radiata without hemorrhagic conversion.      - - - - - - - - -  - - - - - - - - - - - - - - - - - - - - - - - - - - - - - - - - - - - - - - - - - - - - - - - - - - - - - - - - - - - - - - - - - - -    The results above were discussed with MAHI THORNE on 1/18/2024 1:54 PM CST by Dr. Bernnan Joseph.   - - - - - - - - - - - - - - - - - - - - - - - - - - - - - - - - - - - - - - - - - - - - - - - - - - - - - - - - - - - - - - - - - - - - - - - - - - - -      CT Head Perfusion w Contrast - For Tier 2 Stroke   Final Result   IMPRESSION:    1.  No evidence of core infarction or regional hypoperfusion.      Results discussed with Mahi Thorne on 1/18/2024 12:07 PM CST.      CTA Head Neck with Contrast   Final Result   CONCLUSION:    HEAD CT:   1.  No evidence for acute hemorrhage, mass effect, or acute vascular territorial infarction. Consider MRI for further evaluation, as clinically appropriate.   2.  Age-related change.      HEAD CTA:    1.  No evidence for proximal large vessel occlusion or flow-limiting stenosis.      NECK CTA:   1.  No evidence of hemodynamically significant stenosis based on NASCET criteria.   2.  No evidence for dissection.      Results discussed with Mahi Thorne on 1/18/2024 12:07 PM CST.                    ECG:  NSR, rate 69, RBBB, no acute ischemia    I have independently reviewed and interpreted the EKG(s) documented above     PROCEDURES:  Procedures:  none      FINAL IMPRESSION:    ICD-10-CM    1. Cerebrovascular accident (CVA), unspecified mechanism (H)  I63.9         CRITICAL CARE  45 minutes of critical care time spent managing stroke. Time spent interpreting labs, imaging, speaking with pt/consultants and documenting. Independent of any procedures performed.        Mahi Thorne DO  Emergency Medicine  [facility] EMERGENCY DEPARTMENT  1/18/2024  12:37 PM          Mahi Thorne MD  01/18/24 5818

## 2024-01-18 NOTE — ED TRIAGE NOTES
The patient presents with her  from home, the patients  states that she has had confusion 1/17/2024,  states she had slurred speech 1/17\2024. Pt went to have her hair done today and the  noted that she had a new facial droop. It is unsure how long the facial droop has been present. BS is 93, Dr. Thorne in triage.

## 2024-01-18 NOTE — PROGRESS NOTES
"See my previous note-- MRI does now show a stroke on the L side, subcortical, corona radiata. Recommend - Load with Plavix 300mg and aspirin 325mg; continue Plavix 75 mg and aspirin 81mg daily together for 21 days; then aspirin 325mg alone and admit using regular stroke admission orderset with usual workup and please consult neurology    Brunilda Nixon PA-C  Vascular Neurology  01/18/2024 2:01 PM  Securely message with the Vocera Web Console (learn more here)  To page me or covering stroke neurology team member, click here: [email or web address]  Choose \"On Call\" tab at top, then search dropdown box for \"Neurology\" & press Enter, look for Neuro ICU/Stroke    " <<-----Click on this checkbox to enter Post-Op Dx

## 2024-01-18 NOTE — MEDICATION SCRIBE - ADMISSION MEDICATION HISTORY
Medication Scribe Admission Medication History    Admission medication history is complete. The information provided in this note is only as accurate as the sources available at the time of the update.    Information Source(s): Patient and Family member via in-person    Pertinent Information: patient reports self management of medications with some asssitance from spouse.     11/22/23 AMH:  Pertinent Information: Pt was confused and reported that her MD told her to stop Isosorbide because it was making her body sodium levels too low.  However, after asking about Protonix, pt realized that this was the medication that he primary told her to hold off taking.   is present and seconded the Isosorbide as the medication she should be continuing.  Also, she has been experiencing nausea for the past week which prompted her to stop taking a portion of her medications base on what she believed she needs (needed: Lipitor, Synthroid, Miralax & Metamucil, Probiotic, and Co Q10).       Current AMH Continues below:  Patient reports confusion regarding medications.     Patient reports completion of 11/24/23 medication instructions    Sodium Chloride: Patient reports continuing to take this medication BID.     Patient reports no longer taking: Benzonatate, Ciprofloxacin, Neomycin-Poly-Dexa, Omeprazole, Prednisolone, Timolol, Chloraseptic  Changes made to PTA medication list:  Added: Calcium/D3, Systane, Refresh, Prednisone 5 mg  Deleted: Benzonatate, Chloraseptic  Changed: Aspirin to one tab every day, D3 to 2000 units, Miralax to QD    Medication Affordability:  Not including over the counter (OTC) medications, was there a time in the past 3 months when you did not take your medications as prescribed because of cost?: No    Allergies reviewed with patient and updates made in EHR: yes    Medication History Completed By: Kenan España 1/18/2024 2:55 PM    PTA Med List   Medication Sig Last Dose    acetaminophen (TYLENOL) 325 MG  tablet Take 2 tablets (650 mg) by mouth every 4 hours as needed for mild pain or other (and adjunct with moderate or severe pain or per patient request) Past Week at prn    albuterol (PROAIR HFA/PROVENTIL HFA/VENTOLIN HFA) 108 (90 Base) MCG/ACT inhaler Inhale 1 puff into the lungs every 4 hours as needed     aspirin (ASA) 81 MG chewable tablet Take 81 mg by mouth every evening 1/17/2024 at pm    atorvastatin (LIPITOR) 10 MG tablet [ATORVASTATIN (LIPITOR) 10 MG TABLET] Take 10 mg by mouth at bedtime. 1/17/2024 at pm    calcium carbonate-vitamin D (CALTRATE) 600-10 MG-MCG per tablet Take 1 tablet by mouth daily (with lunch) 1/17/2024 at 1200    carboxymethylcellulose PF (REFRESH PLUS) 0.5 % ophthalmic solution Place 1 drop into both eyes 4 times daily as needed for dry eyes Past Week at prn    cholecalciferol 50 MCG (2000 UT) tablet Take 2,000 Units by mouth daily (with lunch) 1/17/2024 at 1200    coenzyme Q10 (CO Q-10) 100 mg capsule Take 100 mg by mouth daily (with lunch) 1/17/2024 at 1200    fish oil-omega-3 fatty acids 1000 MG capsule Take 1 g by mouth daily (with lunch) 1/17/2024 at 1200    isosorbide mononitrate (IMDUR) 30 MG 24 hr tablet Take 30 mg by mouth every evening 1/17/2024 at pm    Lactobacillus rhamnosus GG (CULTURELLE) 10-15 Billion cell capsule Take 1 capsule by mouth daily (with lunch) 1/17/2024 at 1200    levothyroxine (SYNTHROID, LEVOTHROID) 88 MCG tablet Take 88 mcg by mouth every morning 1/18/2024 at am    multivitamin therapeutic tablet Take 1 tablet by mouth daily (with lunch) 1/17/2024 at 1200    polyethylene glycol (MIRALAX) 17 g packet Take 1 packet by mouth every morning 1/18/2024 at am    polyethylene glycol-propylene glycol (SYSTANE ULTRA) 0.4-0.3 % SOLN ophthalmic solution Place 1 drop into both eyes 4 times daily as needed for dry eyes Past Week at prn    predniSONE (DELTASONE) 10 MG tablet Take 5 mg by mouth every morning 1/18/2024 at am    psyllium (METAMUCIL) 3.4 gram packet  [PSYLLIUM (METAMUCIL) 3.4 GRAM PACKET] Take 2 packets by mouth daily. 1/18/2024 at am    sodium chloride 1 GM tablet Take 0.5 tablets (0.5 g) by mouth 2 times daily (with meals) 1/18/2024 at am

## 2024-01-18 NOTE — H&P
[facility]    History and Physical - Hospitalist Service       Date of Admission:  1/18/2024    Assessment & Plan    Assessment:  Jessica Cruz is a 83 year old female admitted on 1/18/2024. She presented to the ER with complaint of slurring of speech and right facial droop.  As per patient and  at bedside patient was having some slurring of speech and funny speech yesterday initially disregarded as having a sinus infection as whenever patient has sinus infection she has some slurring of speech, this morning the slurring of speech worsened and when patient went to the rock her rock told her that her face is asymmetric and lip is drooping downwards and advised her to go to the hospital, patient called PCP who also advised to come immediately to the hospital, in the ER vitals were fairly within normal limits other than elevated blood pressure, labs show elevated bicarb most likely in setting of contraction, troponins trended and are flat, patient also seem to be anemic HEAD CT: No evidence for acute hemorrhage, mass effect, or acute vascular territorial infarction. Consider MRI for further evaluation, as clinically appropriate. Age-related change. HEAD CTA:  No evidence for proximal large vessel occlusion or flow-limiting stenosis. NECK CTA:  No evidence of hemodynamically significant stenosis based on NASCET criteria. No evidence for dissection.  CT head perfusion showed No evidence of core infarction or regional hypoperfusion.  MRI of the brain was ordered which showed Findings suspicious for a focus of acute ischemic change involving the left posterior corona radiata without hemorrhagic conversion.  ED consulted stroke neurology who recommended brain MRI and s/p brain MRI recommended loading with Plavix 300 mg and aspirin 325 mg followed by continuing Plavix 75 and aspirin 81 mg daily for 21 days followed by aspirin 325 along and admit patient for stroke workup and consult  neurology patient is going to be admitted for acute CVA    Problem list and plan:  Acute CVA  Right facial droop along with slurring of speech secondary to above  Patient presented to the ER with complaint of slurring of speech and right facial droop.   HEAD CT: No evidence for acute hemorrhage, mass effect, or acute vascular territorial infarction. Consider MRI for further evaluation, as clinically appropriate. Age-related change.   HEAD CTA:  No evidence for proximal large vessel occlusion or flow-limiting stenosis.   NECK CTA: No evidence of hemodynamically significant stenosis based on NASCET criteria. No evidence for dissection.    CT head perfusion showed No evidence of core infarction or regional hypoperfusion.    MRI of the brain was ordered which showed Findings suspicious for a focus of acute ischemic change involving the left posterior corona radiata without hemorrhagic conversion.    ED consulted stroke neurology who recommended brain MRI and s/p brain MRI recommended loading with Plavix 300 mg and aspirin 325 mg followed by continuing Plavix 75 and aspirin 81 mg daily for 21 days followed by aspirin 325 along and admit patient for stroke workup and consult neurology  Will be doing permissive control of blood pressure, may restart blood pressure medication in a.m. as appropriate  Hemoglobin A1c 5.0  Lipid panel shows total cholesterol 178, HDL of 88 and LDL of 76  Increase atorvastatin from 10 to 40 mg  Continue with dual antiplatelet therapy for 21 days followed by aspirin 325 alone as per neurology recommendations  Echocardiogram has been ordered which showed Left ventricular size, wall motion and function are normal. The ejection fraction is 60-65%. Normal right ventricle size and systolic function. Aortic root and ascending dilatation is present. There is mild to moderate (1-2+) aortic regurgitation. A contrast injection (Bubble Study) was performed that was negative for flow across the interatrial  septum. A Valsalva maneuver was performed.  Follow-up neurology for further recommendations  On Imdur which is currently on hold may restart once off of permissive control of blood pressure  PT, OT, Speech eval  Aspiration or fall precautions  C/w cardiac tele    Normocytic anemia  Monitor CBC, follow-up iron panel    Elevated troponin most likely in setting of type II NSTEMI/demand ischemia  Troponins trended and are flat, currently no complaint of chest pain, could not appreciate any acute ischemic changes on EKG    Elevated bicarb most likely in setting of contraction  Continue gentle IV hydration  Monitor bicarb levels    History of vasculitis  Currently on 5 mg of prednisone every morning    History of hyperlipidemia  Continue with aspirin and atorvastatin    History of hypothyroidism  Continue with levothyroxine    Miscellaneous  Sodium chloride 0.5 g by mouth 2 times daily with meals    DVT PPx  Intermittent pneumatic compression    CODE STATUS  Full code as per discussion with the patient        Diet: Regular Diet Adult    DVT Prophylaxis: Pneumatic Compression Devices  Almaraz Catheter: Not present  Lines: None     Cardiac Monitoring: ACTIVE order. Indication: Stroke, acute (48 hours)  Code Status: Full Code    Discussed patient was alert awake and oriented x 3, patient was a good historian most of the history was obtained from patient and  at bedside, some history was obtained from chart review and the rest of the history was obtained from discussion with the ER physician  Clinically Significant Risk Factors Present on Admission                # Drug Induced Platelet Defect: home medication list includes an antiplatelet medication                   Disposition Plan      Expected Discharge Date: 01/20/2024                  Saad J. Gondal, MD  Hospitalist Service  [facility]  Securely message with [employer] (more info)  Text page via [email or web address] Paging/Directory      ______________________________________________________________________    Chief Complaint   Slurring of speech, right facial droop    History is obtained from the patient    History of Present Illness   Jessica Cruz is a 83 year old female with a past medical history documented below presented to the ER with complaint of slurring of speech and right facial droop.  As per patient and  at bedside patient was having some slurring of speech and funny speech yesterday initially disregarded as having a sinus infection as whenever patient has sinus infection she has some slurring of speech, this morning the slurring of speech worsened and when patient went to the rock her rock told her that her face is asymmetric and lip is drooping downwards and advised her to go to the hospital, patient called PCP who also advised to come immediately to the hospital, in the ER vitals were fairly within normal limits other than elevated blood pressure, labs show elevated bicarb most likely in setting of contraction, troponins trended and are flat, patient also seem to be anemic HEAD CT: No evidence for acute hemorrhage, mass effect, or acute vascular territorial infarction. Consider MRI for further evaluation, as clinically appropriate. Age-related change. HEAD CTA:  No evidence for proximal large vessel occlusion or flow-limiting stenosis. NECK CTA:  No evidence of hemodynamically significant stenosis based on NASCET criteria. No evidence for dissection.  CT head perfusion showed No evidence of core infarction or regional hypoperfusion.  MRI of the brain was ordered which showed Findings suspicious for a focus of acute ischemic change involving the left posterior corona radiata without hemorrhagic conversion.  ED consulted stroke neurology who recommended brain MRI and s/p brain MRI recommended loading with Plavix 300 mg and aspirin 325 mg followed by continuing Plavix 75 and aspirin 81 mg daily for 21 days followed by  aspirin 325 along and admit patient for stroke workup and consult neurology patient is going to be admitted for acute CVA      Past Medical History    Past Medical History:   Diagnosis Date    Aortic regurgitation     Colon cancer (H)     Coronary artery disease     previous PCI    Diverticulosis     History of anesthesia complications     Hyperlipidemia     PONV (postoperative nausea and vomiting)        Past Surgical History   Past Surgical History:   Procedure Laterality Date    ANGIOPLASTY      ZZC PARTIAL HIP REPLACEMENT Left 6/2/2020    Procedure: HEMIARTHROPLASTY, HIP, BIPOLAR LEFT;  Surgeon: Jorge Luis Aceves MD;  Location: [facility];  Service: Orthopedics       Prior to Admission Medications   Prior to Admission Medications   Prescriptions Last Dose Informant Patient Reported? Taking?   Lactobacillus rhamnosus GG (CULTURELLE) 10-15 Billion cell capsule 1/17/2024 at 1200 Self, Spouse/Significant Other Yes Yes   Sig: Take 1 capsule by mouth daily (with lunch)   acetaminophen (TYLENOL) 325 MG tablet Past Week at prn  No Yes   Sig: Take 2 tablets (650 mg) by mouth every 4 hours as needed for mild pain or other (and adjunct with moderate or severe pain or per patient request)   albuterol (PROAIR HFA/PROVENTIL HFA/VENTOLIN HFA) 108 (90 Base) MCG/ACT inhaler   Yes Yes   Sig: Inhale 1 puff into the lungs every 4 hours as needed   aspirin (ASA) 81 MG chewable tablet 1/17/2024 at pm  Yes Yes   Sig: Take 81 mg by mouth every evening   atorvastatin (LIPITOR) 10 MG tablet 1/17/2024 at pm Self, Spouse/Significant Other Yes Yes   Sig: [ATORVASTATIN (LIPITOR) 10 MG TABLET] Take 10 mg by mouth at bedtime.   calcium carbonate-vitamin D (CALTRATE) 600-10 MG-MCG per tablet 1/17/2024 at 1200  Yes Yes   Sig: Take 1 tablet by mouth daily (with lunch)   carboxymethylcellulose PF (REFRESH PLUS) 0.5 % ophthalmic solution Past Week at prn  Yes Yes   Sig: Place 1 drop into both eyes 4 times daily as needed for dry eyes    cholecalciferol 50 MCG (2000 UT) tablet 1/17/2024 at 1200 Self, Spouse/Significant Other Yes Yes   Sig: Take 2,000 Units by mouth daily (with lunch)   coenzyme Q10 (CO Q-10) 100 mg capsule 1/17/2024 at 1200 Self, Spouse/Significant Other Yes Yes   Sig: Take 100 mg by mouth daily (with lunch)   fish oil-omega-3 fatty acids 1000 MG capsule 1/17/2024 at 1200 Self, Spouse/Significant Other Yes Yes   Sig: Take 1 g by mouth daily (with lunch)   isosorbide mononitrate (IMDUR) 30 MG 24 hr tablet 1/17/2024 at pm Self, Spouse/Significant Other Yes Yes   Sig: Take 30 mg by mouth every evening   levothyroxine (SYNTHROID, LEVOTHROID) 88 MCG tablet 1/18/2024 at am Self, Spouse/Significant Other Yes Yes   Sig: Take 88 mcg by mouth every morning   multivitamin therapeutic tablet 1/17/2024 at 1200 Self, Spouse/Significant Other Yes Yes   Sig: Take 1 tablet by mouth daily (with lunch)   nitroglycerin (NITROSTAT) 0.4 MG SL tablet n/a at n/a Self, Spouse/Significant Other Yes No   Sig: [NITROGLYCERIN (NITROSTAT) 0.4 MG SL TABLET] Place 0.4 mg under the tongue every 5 (five) minutes as needed for chest pain.   polyethylene glycol (MIRALAX) 17 g packet 1/18/2024 at am  Yes Yes   Sig: Take 1 packet by mouth every morning   polyethylene glycol-propylene glycol (SYSTANE ULTRA) 0.4-0.3 % SOLN ophthalmic solution Past Week at prn  Yes Yes   Sig: Place 1 drop into both eyes 4 times daily as needed for dry eyes   predniSONE (DELTASONE) 10 MG tablet 1/18/2024 at am  Yes Yes   Sig: Take 5 mg by mouth every morning   psyllium (METAMUCIL) 3.4 gram packet 1/18/2024 at am Self, Spouse/Significant Other No Yes   Sig: [PSYLLIUM (METAMUCIL) 3.4 GRAM PACKET] Take 2 packets by mouth daily.   sodium chloride 1 GM tablet 1/18/2024 at am  No Yes   Sig: Take 0.5 tablets (0.5 g) by mouth 2 times daily (with meals)      Facility-Administered Medications: None        Review of Systems    The 10 point Review of Systems is negative other than noted in the HPI or  here.  Slurring of speech and right facial droop    Physical Exam   Vital Signs: Temp: 98.2  F (36.8  C) Temp src: Oral BP: (!) 147/64 Pulse: 73   Resp: 18 SpO2: 98 % O2 Device: None (Room air)    Weight: 0 lbs 0 oz    GENERAL: Patient was seen and examined at bedside with no acute distress, frail   HENT:  Head is normocephalic, atraumatic.  Right lip drooping downwards, tongue DVT to the right  EYES:  Eyes have anicteric sclerae without conjunctival injection   LUNGS: Bilateral air entry, clear to auscultation bilaterally  CARDIOVASCULAR:  Regular rate and rhythm with no murmurs, gallops or rubs.  ABDOMEN:  Normal bowel sounds. Soft; nontender   EXT: no edema    SKIN:  No acute rashes.  Wrinkled skin  NEUROLOGIC:  Grossly nonfocal.      Medical Decision Making       80 MINUTES SPENT BY ME on the date of service doing chart review, history, exam, documentation & further activities per the note.      Data     I have personally reviewed the following data over the past 24 hrs:    7.4  \   10.1 (L)   / 237     136 99 13.1 /  94   3.4 32 (H) 0.60 \     Trop: 14 BNP: N/A     TSH: N/A T4: N/A A1C: 5.0     INR:  1.05 PTT:  27   D-dimer:  N/A Fibrinogen:  N/A     Ferritin:  N/A % Retic:  1.5 LDH:  N/A       Imaging results reviewed over the past 24 hrs:   Recent Results (from the past 24 hour(s))   CTA Head Neck with Contrast    [facility]  CTA HEAD NECK WITH CONTRAST  1/18/2024 11:48 AM CST     INDICATION: Right facial droop, slurred speech.  TECHNIQUE: Head and neck CT angiogram with IV contrast. Noncontrast head CT followed by axial helical CT images of the head and neck vessels obtained during the arterial phase of intravenous contrast administration. Axial helical 2D reconstructed images   and multiplanar 3D MIP reconstructed images of the head and neck vessels were performed by the technologist. Dose reduction techniques were used.  CONTRAST: 75 mL  Isovue-370.  COMPARISON: MRI 12/21/2020.    FINDINGS:   NONCONTRAST HEAD CT:   INTRACRANIAL CONTENTS: No intracranial hemorrhage, extraaxial collection, or mass effect. No CT evidence of acute infarct. Moderate presumed chronic small vessel ischemic changes. Mild generalized volume loss. No hydrocephalus.     VISUALIZED ORBITS/SINUSES/MASTOIDS: Prior bilateral cataract surgery. Visualized portions of the orbits are otherwise unremarkable. No paranasal sinus mucosal disease. No middle ear or mastoid effusion.    BONES/SOFT TISSUES: No scalp hematoma. No skull fracture.    HEAD CTA:   ANTERIOR CIRCULATION: The intracranial internal carotid and anterior cerebral arteries appear patent. The right and left middle cerebral arteries also appear patent. No aneurysm or high flow vascular malformation is demonstrated.    POSTERIOR CIRCULATION: The intradural vertebral, basilar and visualized proximal cerebellar arteries appear patent. The right and left posterior cerebral arteries demonstrate no proximal occlusion or flow-limiting stenosis. No aneurysm or high flow   vascular malformation is demonstrated.     DURAL VENOUS SINUSES: Expected enhancement of the major dural venous sinuses.    NECK CTA:  RIGHT CAROTID: Mild irregularity in the proximal right ICA with less than 50% stenosis in the right ICA based on NASCET criteria.    LEFT CAROTID: Mild irregularity in the proximal left ICA with less than 50% stenosis in the left ICA based on NASCET criteria.    VERTEBRAL ARTERIES: Balanced vertebral arteries are patent in the neck and into the head.     AORTIC ARCH: There is a left-sided aortic arch. No flow-limiting stenosis is apparent at the origins of the brachiocephalic, common carotid, subclavian or vertebral arteries.    MISCELLANEOUS: The visualized lung apices are well aerated. Mild multilevel cervical spondylosis. The soft tissues of the neck, including the thyroid and parotid glands, are grossly unremarkable for  technique.       Impression    CONCLUSION:   HEAD CT:  1.  No evidence for acute hemorrhage, mass effect, or acute vascular territorial infarction. Consider MRI for further evaluation, as clinically appropriate.  2.  Age-related change.    HEAD CTA:   1.  No evidence for proximal large vessel occlusion or flow-limiting stenosis.    NECK CTA:  1.  No evidence of hemodynamically significant stenosis based on NASCET criteria.  2.  No evidence for dissection.    Results discussed with Geoffrey Thorne on 1/18/2024 12:07 PM CST.    CT Head Perfusion w Contrast - For Tier 2 Stroke    Narrative    EXAM: TEMPORARY  LOCATION:  DATE: 1/18/2024    INDICATION: Slurred speech, right facial droop.  COMPARISON: CT/CTA 01/18/2024.  TECHNIQUE: CT cerebral perfusion was performed utilizing a second contrast bolus. Perfusion data were post processed with generation of standard perfusion maps and estimation of ischemic/infarcted volumes utilizing standard threshold values. Dose   reduction techniques were used.   CONTRAST: 75 mL Isovue-370.    FINDINGS:   TOTAL HYPOPERFUSION: Using the threshold of Tmax>6 seconds, no area of regional hypoperfusion is demonstrated.    CORE INFARCTION: Using the threshold of CBF<30%, no core infarction is demonstrated.    PENUMBRA: The penumbra volume (mismatch volume) is 0 mL.      Impression    IMPRESSION:   1.  No evidence of core infarction or regional hypoperfusion.    Results discussed with Geoffrey Thorne on 1/18/2024 12:07 PM CST.   MR Brain w/o & w Contrast    Narrative    EXAM: MR BRAIN W/O and W CONTRAST  LOCATION: [facility]  DATE: 1/18/2024    INDICATION: Right facial droop. Dysarthria  COMPARISON: CTA the head and neck with CT head perfusion dated 01/18/2024.  CONTRAST: Gadavist 6 mL  TECHNIQUE: Routine multiplanar multisequence head MRI without and with intravenous contrast.    FINDINGS:  INTRACRANIAL CONTENTS: Focus of restricted diffusion and associated T2/FLAIR  signal hyperintensity in the posterior left corona radiata (series 3 image #16) suspicious for a focus of acute ischemic change. No mass, acute hemorrhage, or extra-axial fluid   collections. Patchy and confluent nonspecific T2/FLAIR hyperintensities within the cerebral white matter most consistent with moderate chronic microvascular ischemic change. Mild to moderate generalized cerebral volume loss. No hydrocephalus. Normal   position of the cerebellar tonsils. No pathologic contrast enhancement.    SELLA: No abnormality accounting for technique.    OSSEOUS STRUCTURES/SOFT TISSUES: Normal marrow signal. The major intracranial vascular flow voids are maintained.     ORBITS: Prior bilateral cataract surgery. Visualized portions of the orbits are otherwise unremarkable.     SINUSES/MASTOIDS: No paranasal sinus mucosal disease. No middle ear or mastoid effusion.       Impression    IMPRESSION:    1.  Findings suspicious for a focus of acute ischemic change involving the left posterior corona radiata without hemorrhagic conversion.    - - - - - - - - - - - - - - - - - - - - - - - - - - - - - - - - - - - - - - - - - - - - - - - - - - - - - - - - - - - - - - - - - - - - - - - - - - - -   The results above were discussed with MAHI SAL on 2024 1:54 PM CST by Dr. Brennan Joseph.  - - - - - - - - - - - - - - - - - - - - - - - - - - - - - - - - - - - - - - - - - - - - - - - - - - - - - - - - - - - - - - - - - - - - - - - - - - - -   Echocardiogram Complete - For age > 60 yrs   Result Value    LVEF  60-65%    [facility]    [id number]  [id number]  [id number]  [id number]^GONDAL^ANTOINETTE^[place] [address]     Name: LUANNE GONZALEZ  MRN: [id number]  : 1940  Study Date: 2024 03:46 PM  Age: 83 yrs  Gender: Female  Patient Location: [facility]  Reason For Study: Cerebrovascular Incident  Ordering Physician: GONDAL, SAAD J  Performed By: MARIAM     BSA: 1.6 m2  Height: 64  in  Weight: 130 lb  HR: 60  BP: 154/69 mmHg  ______________________________________________________________________________  Procedure  Complete Echo Adult.  ______________________________________________________________________________  Interpretation Summary     Left ventricular size, wall motion and function are normal. The ejection  fraction is 60-65%.  Normal right ventricle size and systolic function.  Aortic root and ascending dilatation is present.  There is mild to moderate (1-2+) aortic regurgitation.  A contrast injection (Bubble Study) was performed that was negative for flow  across the interatrial septum.  A Valsalva maneuver was performed.  ______________________________________________________________________________  Left Ventricle  Left ventricular size, wall motion and function are normal. The ejection  fraction is 60-65%. There is normal left ventricular wall thickness. Left  ventricular diastolic function is normal. No regional wall motion  abnormalities noted.     Right Ventricle  Normal right ventricle size and systolic function. TAPSE is normal, which is  consistent with normal right ventricular systolic function.     Atria  The left atrium is mild to moderately dilated. Right atrial size is normal.  There is no color Doppler evidence of an atrial shunt. A contrast injection  (Bubble Study) was performed that was negative for flow across the interatrial  septum. A Valsalva maneuver was performed.     Mitral Valve  Mitral valve leaflets appear normal. There is no evidence of mitral stenosis  or clinically significant mitral regurgitation. There is mild mitral annular  calcification. There is mild (1+) mitral regurgitation.     Tricuspid Valve  Right ventricle systolic pressure estimate normal.     Aortic Valve  The aortic valve is trileaflet with aortic valve sclerosis. There is mild to  moderate (1-2+) aortic regurgitation. This aortic regurgitation is central  valvular. No hemodynamically  significant valvular aortic stenosis.     Pulmonic Valve  The pulmonic valve is not well seen, but is grossly normal. There is trace  pulmonic valvular regurgitation.     Vessels  Aortic root and ascending dilatation is present. IVC diameter <2.1 cm  collapsing >50% with sniff suggests a normal RA pressure of 3 mmHg.     Pericardium  There is no pericardial effusion.     ______________________________________________________________________________  MMode/2D Measurements & Calculations  IVSd: 0.97 cm  LVIDd: 5.4 cm  LVIDs: 3.2 cm  LVPWd: 0.97 cm  FS: 40.5 %     LV mass(C)d: 201.5 grams  LV mass(C)dI: 123.5 grams/m2  Ao root diam: 4.4 cm  asc Aorta Diam: 3.9 cm  LVOT diam: 2.5 cm  LVOT area: 4.9 cm2  Ao root diam index Ht(cm/m): 2.7  Ao root diam index BSA (cm/m2): 2.7  Asc Ao diam index BSA (cm/m2): 2.4  Asc Ao diam index Ht(cm/m): 2.4  LA Volume (BP): 57.9 ml  LA Volume Index (BP): 35.5 ml/m2     LA Volume Indexed (AL/bp): 37.5 ml/m2  RWT: 0.36  TAPSE: 1.9 cm     Time Measurements  MM HR: 68.0 BPM     Doppler Measurements & Calculations  MV E max mihir: 46.6 cm/sec  MV A max mihir: 105.0 cm/sec  MV E/A: 0.44     Ao V2 max: 185.0 cm/sec  Ao max P.0 mmHg  Ao V2 mean: 120.0 cm/sec  Ao mean P.0 mmHg  Ao V2 VTI: 44.1 cm  JOSE CARLOS(I,D): 2.5 cm2  JOSE CARLOS(V,D): 2.5 cm2  AI P1/2t: 383.0 msec  LV V1 max PG: 3.6 mmHg  LV V1 max: 95.1 cm/sec  LV V1 VTI: 22.9 cm  SV(LVOT): 112.4 ml  SI(LVOT): 68.9 ml/m2  PA acc time: 0.09 sec  PI end-d mihir: 130.0 cm/sec  Pulm Sys Mihir: 55.9 cm/sec  Pulm Cook Mihir: 48.7 cm/sec  Pulm A Revs Mihir: 31.3 cm/sec  Pulm S/D: 1.1  AV Mihir Ratio (DI): 0.51  JOSE CARLOS Index (cm2/m2): 1.6  E/E': 9.7  E/E' av.3  Lateral E/e': 6.8  Medial E/e': 9.7  Peak E' Mihir: 4.8 cm/sec  RV S Mihir: 18.0 cm/sec     ______________________________________________________________________________  Report approved by: Roberto Cooper 2024 04:52 PM

## 2024-01-19 ENCOUNTER — HOSPITAL ENCOUNTER (EMERGENCY)
Facility: HOSPITAL | Age: 84
End: 2024-01-19
Payer: COMMERCIAL

## 2024-01-19 ENCOUNTER — APPOINTMENT (OUTPATIENT)
Dept: PHYSICAL THERAPY | Facility: HOSPITAL | Age: 84
DRG: 064 | End: 2024-01-19
Attending: STUDENT IN AN ORGANIZED HEALTH CARE EDUCATION/TRAINING PROGRAM
Payer: COMMERCIAL

## 2024-01-19 ENCOUNTER — APPOINTMENT (OUTPATIENT)
Dept: SPEECH THERAPY | Facility: HOSPITAL | Age: 84
DRG: 064 | End: 2024-01-19
Attending: STUDENT IN AN ORGANIZED HEALTH CARE EDUCATION/TRAINING PROGRAM
Payer: COMMERCIAL

## 2024-01-19 ENCOUNTER — APPOINTMENT (OUTPATIENT)
Dept: OCCUPATIONAL THERAPY | Facility: HOSPITAL | Age: 84
DRG: 064 | End: 2024-01-19
Attending: STUDENT IN AN ORGANIZED HEALTH CARE EDUCATION/TRAINING PROGRAM
Payer: COMMERCIAL

## 2024-01-19 VITALS
DIASTOLIC BLOOD PRESSURE: 75 MMHG | HEIGHT: 63 IN | TEMPERATURE: 98.1 F | RESPIRATION RATE: 17 BRPM | OXYGEN SATURATION: 97 % | WEIGHT: 124.12 LBS | HEART RATE: 86 BPM | BODY MASS INDEX: 21.99 KG/M2 | SYSTOLIC BLOOD PRESSURE: 156 MMHG

## 2024-01-19 PROBLEM — I77.6 VASCULITIS (H): Status: RESOLVED | Noted: 2024-01-19 | Resolved: 2024-01-19

## 2024-01-19 PROBLEM — I77.6 VASCULITIS (H): Status: ACTIVE | Noted: 2024-01-19

## 2024-01-19 PROBLEM — R79.89 ELEVATED TROPONIN: Status: ACTIVE | Noted: 2024-01-19

## 2024-01-19 PROBLEM — I77.82 ANCA-ASSOCIATED VASCULITIS (H): Status: ACTIVE | Noted: 2024-01-19

## 2024-01-19 PROBLEM — I25.119 CORONARY ARTERY DISEASE INVOLVING NATIVE CORONARY ARTERY OF NATIVE HEART WITH ANGINA PECTORIS (H): Status: ACTIVE | Noted: 2020-06-01

## 2024-01-19 PROBLEM — D63.8 ANEMIA OF CHRONIC DISEASE: Status: ACTIVE | Noted: 2020-06-01

## 2024-01-19 PROBLEM — M21.372 FOOT DROP, LEFT FOOT: Status: ACTIVE | Noted: 2020-06-05

## 2024-01-19 LAB
ANION GAP SERPL CALCULATED.3IONS-SCNC: 8 MMOL/L (ref 7–15)
BUN SERPL-MCNC: 9.9 MG/DL (ref 8–23)
CALCIUM SERPL-MCNC: 8.5 MG/DL (ref 8.8–10.2)
CHLORIDE SERPL-SCNC: 105 MMOL/L (ref 98–107)
CREAT SERPL-MCNC: 0.55 MG/DL (ref 0.51–0.95)
DEPRECATED HCO3 PLAS-SCNC: 26 MMOL/L (ref 22–29)
EGFRCR SERPLBLD CKD-EPI 2021: 90 ML/MIN/1.73M2
ERYTHROCYTE [DISTWIDTH] IN BLOOD BY AUTOMATED COUNT: 16.3 % (ref 10–15)
FERRITIN SERPL-MCNC: 196 NG/ML (ref 11–328)
GLUCOSE BLDC GLUCOMTR-MCNC: 103 MG/DL (ref 70–99)
GLUCOSE BLDC GLUCOMTR-MCNC: 65 MG/DL (ref 70–99)
GLUCOSE BLDC GLUCOMTR-MCNC: 81 MG/DL (ref 70–99)
GLUCOSE SERPL-MCNC: 90 MG/DL (ref 70–99)
HCT VFR BLD AUTO: 29.8 % (ref 35–47)
HGB BLD-MCNC: 9.8 G/DL (ref 11.7–15.7)
MAGNESIUM SERPL-MCNC: 2 MG/DL (ref 1.7–2.3)
MCH RBC QN AUTO: 28.2 PG (ref 26.5–33)
MCHC RBC AUTO-ENTMCNC: 32.9 G/DL (ref 31.5–36.5)
MCV RBC AUTO: 86 FL (ref 78–100)
PHOSPHATE SERPL-MCNC: 2.7 MG/DL (ref 2.5–4.5)
PLATELET # BLD AUTO: 242 10E3/UL (ref 150–450)
POTASSIUM SERPL-SCNC: 3.7 MMOL/L (ref 3.4–5.3)
RBC # BLD AUTO: 3.47 10E6/UL (ref 3.8–5.2)
SODIUM SERPL-SCNC: 139 MMOL/L (ref 135–145)
VIT B12 SERPL-MCNC: 543 PG/ML (ref 232–1245)
WBC # BLD AUTO: 6 10E3/UL (ref 4–11)

## 2024-01-19 PROCEDURE — 36415 COLL VENOUS BLD VENIPUNCTURE: CPT | Performed by: STUDENT IN AN ORGANIZED HEALTH CARE EDUCATION/TRAINING PROGRAM

## 2024-01-19 PROCEDURE — 85027 COMPLETE CBC AUTOMATED: CPT | Performed by: STUDENT IN AN ORGANIZED HEALTH CARE EDUCATION/TRAINING PROGRAM

## 2024-01-19 PROCEDURE — 92610 EVALUATE SWALLOWING FUNCTION: CPT | Mod: GN

## 2024-01-19 PROCEDURE — 92507 TX SP LANG VOICE COMM INDIV: CPT | Mod: GN

## 2024-01-19 PROCEDURE — 80048 BASIC METABOLIC PNL TOTAL CA: CPT | Performed by: STUDENT IN AN ORGANIZED HEALTH CARE EDUCATION/TRAINING PROGRAM

## 2024-01-19 PROCEDURE — 92523 SPEECH SOUND LANG COMPREHEN: CPT | Mod: GN

## 2024-01-19 PROCEDURE — 84100 ASSAY OF PHOSPHORUS: CPT | Performed by: STUDENT IN AN ORGANIZED HEALTH CARE EDUCATION/TRAINING PROGRAM

## 2024-01-19 PROCEDURE — 97162 PT EVAL MOD COMPLEX 30 MIN: CPT | Mod: GP

## 2024-01-19 PROCEDURE — 250N000013 HC RX MED GY IP 250 OP 250 PS 637: Performed by: INTERNAL MEDICINE

## 2024-01-19 PROCEDURE — 97116 GAIT TRAINING THERAPY: CPT | Mod: GP

## 2024-01-19 PROCEDURE — 97535 SELF CARE MNGMENT TRAINING: CPT | Mod: GO

## 2024-01-19 PROCEDURE — 99239 HOSP IP/OBS DSCHRG MGMT >30: CPT | Performed by: INTERNAL MEDICINE

## 2024-01-19 PROCEDURE — 99223 1ST HOSP IP/OBS HIGH 75: CPT | Performed by: PSYCHIATRY & NEUROLOGY

## 2024-01-19 PROCEDURE — 250N000012 HC RX MED GY IP 250 OP 636 PS 637: Performed by: STUDENT IN AN ORGANIZED HEALTH CARE EDUCATION/TRAINING PROGRAM

## 2024-01-19 PROCEDURE — 83735 ASSAY OF MAGNESIUM: CPT | Performed by: STUDENT IN AN ORGANIZED HEALTH CARE EDUCATION/TRAINING PROGRAM

## 2024-01-19 PROCEDURE — 99418 PROLNG IP/OBS E/M EA 15 MIN: CPT | Performed by: PSYCHIATRY & NEUROLOGY

## 2024-01-19 PROCEDURE — 97165 OT EVAL LOW COMPLEX 30 MIN: CPT | Mod: GO

## 2024-01-19 PROCEDURE — 250N000013 HC RX MED GY IP 250 OP 250 PS 637: Performed by: STUDENT IN AN ORGANIZED HEALTH CARE EDUCATION/TRAINING PROGRAM

## 2024-01-19 PROCEDURE — 258N000003 HC RX IP 258 OP 636: Performed by: STUDENT IN AN ORGANIZED HEALTH CARE EDUCATION/TRAINING PROGRAM

## 2024-01-19 RX ORDER — POLYETHYLENE GLYCOL 3350 17 G/17G
17 POWDER, FOR SOLUTION ORAL EVERY MORNING
Status: DISCONTINUED | OUTPATIENT
Start: 2024-01-19 | End: 2024-01-19 | Stop reason: HOSPADM

## 2024-01-19 RX ORDER — ATORVASTATIN CALCIUM 40 MG/1
40 TABLET, FILM COATED ORAL EVERY EVENING
Qty: 30 TABLET | Refills: 3 | Status: SHIPPED | OUTPATIENT
Start: 2024-01-19

## 2024-01-19 RX ORDER — CLOPIDOGREL BISULFATE 75 MG/1
75 TABLET ORAL DAILY
Qty: 30 TABLET | Refills: 2 | Status: SHIPPED | OUTPATIENT
Start: 2024-01-20

## 2024-01-19 RX ADMIN — CLOPIDOGREL BISULFATE 75 MG: 75 TABLET ORAL at 10:41

## 2024-01-19 RX ADMIN — POLYETHYLENE GLYCOL 3350 17 G: 17 POWDER, FOR SOLUTION ORAL at 13:51

## 2024-01-19 RX ADMIN — SODIUM CHLORIDE TAB 1 GM 0.5 G: 1 TAB at 10:51

## 2024-01-19 RX ADMIN — LEVOTHYROXINE SODIUM 88 MCG: 88 TABLET ORAL at 06:37

## 2024-01-19 RX ADMIN — PSYLLIUM HUSK 2 PACKET: 3.4 POWDER ORAL at 13:51

## 2024-01-19 RX ADMIN — PREDNISONE 5 MG: 5 TABLET ORAL at 10:41

## 2024-01-19 RX ADMIN — SODIUM CHLORIDE: 9 INJECTION, SOLUTION INTRAVENOUS at 04:25

## 2024-01-19 ASSESSMENT — ACTIVITIES OF DAILY LIVING (ADL)
ADLS_ACUITY_SCORE: 26

## 2024-01-19 NOTE — PHARMACY-CONSULT NOTE
Pharmacy Consult to evaluate for medication related stroke core measures    Jessica Cruz, 83 year old female admitted for facial droop on 1/18/2024.    Thrombolytic was not given because of Time from onset contraindications    VTE Prophylaxis SCDs /PCDs placed on 1/18/24, as appropriate prior to end of hospital day 2.    Antithrombotic: aspirin and clopidogrel started on 1/18/24, as appropriate by end of hospital day 2. Continue antithrombotic therapy on discharge to meet quality measures, unless contraindicated.    Anticoagulation if history of A-fib/flutter: Patient does not have history of A-fib/flutter - anticoagulation not required for medication related stroke core measures.     LDL Cholesterol Calculated   Date Value Ref Range Status   01/18/2024 76 <=100 mg/dL Final       Patient's home statin, Lipitor (atorvastatin) restarted; continue statin on discharge to meet quality measures, unless contraindicated. (Dose was increased for 10mg to 40mg)    Recommendations: None at this time    Thank you for the consult.    Antony Ureña RPH 1/18/2024 7:48 PM

## 2024-01-19 NOTE — PLAN OF CARE
Problem: Adult Inpatient Plan of Care  Goal: Plan of Care Review  Description: The Plan of Care Review/Shift note should be completed every shift.  The Outcome Evaluation is a brief statement about your assessment that the patient is improving, declining, or no change.  This information will be displayed automatically on your shift  note.  Outcome: Progressing  Flowsheets (Taken 1/18/2024 2047)  Plan of Care Reviewed With: patient     Problem: Stroke, Ischemic (Includes Transient Ischemic Attack)  Goal: Safe and Effective Swallow  Outcome: Progressing   Goal Outcome Evaluation:      Plan of Care Reviewed With: patient  Pt alert and oriented x 4, up with stand by and a cane. Neuro check Q 4 hrs with NIH score 3 for slurred speech and right side facial droop. VSS on room air. Denies pain/ discomfort. Pt passed bed side swallow study, diet advanced to regular diet. NS at 75 ml/ hr. Will continue to monitor.

## 2024-01-19 NOTE — CONSULTS
"Care Management Initial Consult    General Information  Assessment completed with: Patient, Spouse or significant other, Jessica and  Timbo  Type of CM/SW Visit: Initial Assessment    Primary Care Provider verified and updated as needed: Yes   Readmission within the last 30 days: no previous admission in last 30 days      Reason for Consult: discharge planning  Advance Care Planning: Advance Care Planning Reviewed: no concerns identified          Communication Assessment  Patient's communication style: spoken language (English or Bilingual)    Hearing Difficulty or Deaf: yes   Wear Glasses or Blind: yes    Cognitive  Cognitive/Neuro/Behavioral: WDL     Living Environment:   People in home: spouse  Jessica and  Timbo  Current living Arrangements: house (\"1 level townhouse with a walk in shower\".)      Able to return to prior arrangements: yes       Family/Social Support:  Care provided by: spouse/significant other  Provides care for: no one  Marital Status:   , Children  Timbo       Description of Support System: Supportive, Involved    Support Assessment: Adequate family and caregiver support, Adequate social supports, Patient communicates needs well met    Current Resources:   Patient receiving home care services: Yes  Skilled Home Care Services: Physical Therapy (\"I think the agency is called [facility] and I still have PT, but it was going to end 1/23/24.)  Community Resources: Home Care  Equipment currently used at home: cane, straight  Supplies currently used at home: Hearing Aid Batteries, Other (\"hearing aids, upper dentures, glasses\")    Employment/Financial:  Employment Status: retired     Employment/ Comments: \"My  has  benefits, but none that I have or use\".  Financial Concerns:     Referral to Financial Worker: No       Does the patient's insurance plan have a 3 day qualifying hospital stay waiver?  No      Functional Status:  Prior to admission patient " "needed assistance:   Dependent ADLs:: Independent (\"I only use the cane outside and only sometimes use the walker\".)  Dependent IADLs:: Independent (\"my  and I split out the housekeeping and laundry tasks. So I dust and . My  does the floors. I do the cooking. I have started driving again a few times lately, but my  does most of the driving\".)       Mental Health Status:          Chemical Dependency Status:                Values/Beliefs:  Spiritual, Cultural Beliefs, Rastafari Practices, Values that affect care:                 Additional Information:  Jessica lives in a 1 level townhouse with her . She is independent with ADLs and IADLs. She states, \"my  and I split out the housekeeping and laundry tasks. So I dust and . My  does the floors. I do the cooking. I have started driving again a few times lately, but my  does most of the driving\".    \"I use the cane when I go outside the house. I have the walker by my bedside to use if I need it. Normally in the house I don't use either the walker or the cane\".    \"I think the agency is called [facility] and I still have PT help, but it was going to end 1/23/24.  Awaiting PT/OT recommendations, but should be able to return home.      to transport at discharge.    CM to follow for medical progression of care, discharge recommendations, and final discharge plan.    Odette Mendoza RN    "

## 2024-01-19 NOTE — DISCHARGE SUMMARY
[facility]    Discharge Summary  Hospitalist    Date of Admission:  1/18/2024  Date of Discharge:  1/19/2024  4:39 PM  Discharging Provider: Austin Hanks MD, MD    Discharge Diagnoses   Principal Problem:    Acute CVA -- Rght face droop and slurred speech  Active Problems:    Anemia of chronic disease    CAD -- S/P ELLIE x 1 in 2018 for Angina    Hyperlipidemia    Foot drop, left foot    Hx of Colon CA in 2011    Elevated troponin      History of Present Illness    83 year old female with complaint of slurring of speech and right facial droop some slurring of speech and funny speech which started yesterday. HEAD CTA:  No evidence for proximal large vessel occlusion or flow-limiting stenosis. NECK CTA: No evidence of hemodynamically significant stenosis based on NASCET criteria. No evidence for dissection.  CT head perfusion showed No evidence of core infarction or regional hypoperfusion.  MRI of the brain was ordered which showed Findings suspicious for a focus of acute ischemic change involving the left posterior corona radiata without hemorrhagic conversion.  ED consulted stroke neurology who recommended brain MRI and s/p brain MRI recommended loading with Plavix 300 mg and aspirin 325 mg followed by continuing Plavix 75 and aspirin 81 mg daily for 21 days followed by aspirin 325 along and admit patient for stroke workup and consult neurology patient is going to be admitted for acute CVA       Hospital Course   Admitted to cardiac telemetry.   MRI of the brain was ordered which showed focus of acute ischemic change involving the left posterior corona radiata.    Neurology consulted and treated with Plavix 300 mg and aspirin 325 mg followed by continuing Plavix 75 and aspirin 81 mg daily for 21 days followed by aspirin 325.  Cardiac Echo with bubble study normal, was seen by PT and OT, and did well and will discharge home with addition of Plavix 75 mg daily for 3 weeks, and  Atorvastatin dose was increased to 30 mg daily.      Austin Hanks MD  Pager: [phone]  Cell Phone:  [phone]       Significant Results and Procedures   As above    Pending Results   These results will be followed up by Dr. Hanks  Unresulted Labs Ordered in the Past 30 Days of this Admission       Date and Time Order Name Status Description    1/18/2024  3:15 PM Vitamin B12 In process     1/18/2024  3:15 PM Ferritin In process             Code Status   Full Code       Primary Care Physician   Physician No Ref-Primary    Physical Exam   Temp: 98.1  F (36.7  C) Temp src: Oral BP: 134/65 Pulse: 72   Resp: 16 SpO2: 99 % O2 Device: None (Room air)    Vitals:    01/18/24 2100   Weight: 56.3 kg (124 lb 1.9 oz)     Vital Signs with Ranges  Temp:  [97.4  F (36.3  C)-98.6  F (37  C)] 98.1  F (36.7  C)  Pulse:  [64-77] 72  Resp:  [11-22] 16  BP: (134-171)/(64-93) 134/65  SpO2:  [97 %-99 %] 99 %  I/O last 3 completed shifts:  In: 1090 [P.O.:540; I.V.:550]  Out: 1000 [Urine:1000]    Exam on discharge:   Neuro -- slight right face droop    Discharge Disposition   Discharged to home  Condition at discharge: Stable    Consultations This Hospital Stay   NEUROLOGY IP STROKE CONSULT  SPEECH LANGUAGE PATH ADULT IP CONSULT  PHARMACY IP CONSULT  PHARMACY IP CONSULT  PHARMACY IP CONSULT  PHYSICAL THERAPY ADULT IP CONSULT  OCCUPATIONAL THERAPY ADULT IP CONSULT  REHAB ADMISSIONS LIAISON IP CONSULT  CARE MANAGEMENT / SOCIAL WORK IP CONSULT  NEUROLOGY IP CONSULT  SMOKING CESSATION PROGRAM IP CONSULT    Time Spent on this Encounter   I spent a total of 35 minutes discharging this patient.     Discharge Orders      Reason for your hospital stay    Slight left sided stroke with right face droop.     Follow-up and recommended labs and tests     Follow up with primary care provider in 1 week (Dr. Davila with [facility] near [facility]).     Call Dr. Hanks if any medical questions at Cell Phone [phone].      Activity    Your activity upon discharge: activity as tolerated     Diet    Follow this diet upon discharge: Orders Placed This Encounter      Regular Diet Adult     Discharge Medications   Current Discharge Medication List        START taking these medications    Details   clopidogrel (PLAVIX) 75 MG tablet 1 tablet (75 mg) by Oral or NG Tube route daily  Qty: 30 tablet, Refills: 2    Associated Diagnoses: Cerebrovascular accident (CVA), unspecified mechanism (H)           CONTINUE these medications which have CHANGED    Details   atorvastatin (LIPITOR) 40 MG tablet Take 1 tablet (40 mg) by mouth every evening  Qty: 30 tablet, Refills: 3    Associated Diagnoses: Cerebrovascular accident (CVA), unspecified mechanism (H)           CONTINUE these medications which have NOT CHANGED    Details   acetaminophen (TYLENOL) 325 MG tablet Take 2 tablets (650 mg) by mouth every 4 hours as needed for mild pain or other (and adjunct with moderate or severe pain or per patient request)  Qty: 10 tablet, Refills: 0    Associated Diagnoses: Acute cystitis with hematuria      albuterol (PROAIR HFA/PROVENTIL HFA/VENTOLIN HFA) 108 (90 Base) MCG/ACT inhaler Inhale 1 puff into the lungs every 4 hours as needed      aspirin (ASA) 81 MG chewable tablet Take 81 mg by mouth every evening      calcium carbonate-vitamin D (CALTRATE) 600-10 MG-MCG per tablet Take 1 tablet by mouth daily (with lunch)      carboxymethylcellulose PF (REFRESH PLUS) 0.5 % ophthalmic solution Place 1 drop into both eyes 4 times daily as needed for dry eyes      cholecalciferol 50 MCG (2000 UT) tablet Take 2,000 Units by mouth daily (with lunch)      coenzyme Q10 (CO Q-10) 100 mg capsule Take 100 mg by mouth daily (with lunch)      fish oil-omega-3 fatty acids 1000 MG capsule Take 1 g by mouth daily (with lunch)      isosorbide mononitrate (IMDUR) 30 MG 24 hr tablet Take 30 mg by mouth every evening      Lactobacillus rhamnosus GG (CULTURELLE) 10-15 Billion cell  "capsule Take 1 capsule by mouth daily (with lunch)      levothyroxine (SYNTHROID, LEVOTHROID) 88 MCG tablet Take 88 mcg by mouth every morning      multivitamin therapeutic tablet Take 1 tablet by mouth daily (with lunch)      polyethylene glycol (MIRALAX) 17 g packet Take 1 packet by mouth every morning      polyethylene glycol-propylene glycol (SYSTANE ULTRA) 0.4-0.3 % SOLN ophthalmic solution Place 1 drop into both eyes 4 times daily as needed for dry eyes      predniSONE (DELTASONE) 10 MG tablet Take 5 mg by mouth every morning      psyllium (METAMUCIL) 3.4 gram packet [PSYLLIUM (METAMUCIL) 3.4 GRAM PACKET] Take 2 packets by mouth daily.  Refills: 0    Associated Diagnoses: Hip fracture, left, closed, initial encounter (H)      sodium chloride 1 GM tablet Take 0.5 tablets (0.5 g) by mouth 2 times daily (with meals)  Qty: 2 tablet, Refills: 0    Associated Diagnoses: Hyponatremia; SIADH (syndrome of inappropriate ADH production) (H24)      nitroglycerin (NITROSTAT) 0.4 MG SL tablet [NITROGLYCERIN (NITROSTAT) 0.4 MG SL TABLET] Place 0.4 mg under the tongue every 5 (five) minutes as needed for chest pain.           Allergies   Allergies   Allergen Reactions    Ace Inhibitors Other (See Comments)     Angioedema per Dr Сергей Krishnamurthy., Pt developed facial bruising and headache after taking lisinopil., See 6/27/16 telephone encounter, Lisinopril , Angioedema per Dr Сергей Krishnamurthy., Pt developed facial bruising and headache after taking lisinopil., See 6/27/16 telephone encounter, Angioedema per Dr Сергей Krishnamurthy., Pt developed facial bruising and headache after taking lisinopil., See 6/27/16 telephone encounter    Latex Unknown and Rash     \"rash with latex gloves\"    Adhesive Tape-Silicones [Adhesive Tape] Dermatitis     Severe Blisters wherever adhesive touches, Her skin. , Paper tape is OK    Amoxicillin Other (See Comments)     Pt states oral amoxicillin \"doesn't work, the infection keeps coming back\"    Pravastatin " "Unknown     Leg cramps    Prinivil [Lisinopril] Unknown     bruising    Senna Other (See Comments)     Colon inflamation    Temazepam Other (See Comments)     Balance problem,s    Trazodone Unknown     Tongue swelling; anxiety    Alendronate [Alendronate] Unknown and Other (See Comments)     \"constipated\", \"constipated\"    Flonase [Fluticasone] Rash    Metoprolol Succinate [Metoprolol] Rash    Simvastatin Rash    Sulfa (Sulfonamide Antibiotics) [Sulfa Antibiotics] Rash     Data   Most Recent 3 CBC's:  Recent Labs   Lab Test 01/19/24  0638 01/18/24  1151 11/24/23  0821   WBC 6.0 7.4 11.1*   HGB 9.8* 10.1* 10.9*   MCV 86 86 83    237 473*      Most Recent 3 BMP's:  Recent Labs   Lab Test 01/19/24  1329 01/19/24  0904 01/19/24  0638 01/18/24  1722 01/18/24  1151 01/18/24  1134 11/24/23  0821   NA  --   --  139  --  136  --  134*   POTASSIUM  --   --  3.7  --  3.4  --  4.2   CHLORIDE  --   --  105  --  99  --  95*   CO2  --   --  26  --  32*  --  28   BUN  --   --  9.9  --  13.1  --  6.3*   CR  --   --  0.55  --  0.60  --  0.47*   ANIONGAP  --   --  8  --  5*  --  11   DIMITRIS  --   --  8.5*  --  9.3  --  8.9   * 65* 90   < > 107*   < > 132*    < > = values in this interval not displayed.     Most Recent 2 LFT's:  Recent Labs   Lab Test 11/22/23  0748 11/13/23  1048   AST 45 16   ALT 43 15   ALKPHOS 66 63   BILITOTAL 0.7 0.9     Most Recent INR's and Anticoagulation Dosing History:  Anticoagulation Dose History          Latest Ref Rng & Units 6/1/2020 11/13/2023 11/21/2023 1/18/2024   Recent Dosing and Labs   INR 0.85 - 1.15 1.05  1.18  1.21  1.05      Most Recent 3 Troponin's:No lab results found.  Most Recent Cholesterol Panel:  Recent Labs   Lab Test 01/18/24  1151   CHOL 178   LDL 76   HDL 88   TRIG 71     Most Recent 6 Bacteria Isolates From Any Culture (See EPIC Reports for Culture Details):No lab results found.  Most Recent TSH, T4 and A1c Labs:  Recent Labs   Lab Test 01/18/24  1151 11/21/23  8758 "   TSH  --  4.62*   T4  --  1.59   A1C 5.0  --

## 2024-01-19 NOTE — PROGRESS NOTES
Pt a/o x4, pleasant, cooperative. Up in chair most of day,  at bedside most of shift. Pt ate all of meals. Nih 2. Right side facial droop noted and slurred speech,  states speech is better than yesterday. Mikel. Pt states is glad to go home today. Denies pain. Reminded pt to use call light for needs. Continue to monitor pt.

## 2024-01-19 NOTE — PLAN OF CARE
"  Problem: Adult Inpatient Plan of Care  Goal: Plan of Care Review  Description: The Plan of Care Review/Shift note should be completed every shift.  The Outcome Evaluation is a brief statement about your assessment that the patient is improving, declining, or no change.  This information will be displayed automatically on your shift  note.  Outcome: Progressing   Goal Outcome Evaluation:    A&O x4. RA. Denies pain. NIH = 2-3. Tele = NSR w/ 1st AVB. Tolerating IV fluids well. No acute changes noted. Careplan ongoing.     BP (!) 152/70 (BP Location: Left arm)   Pulse 77   Temp 98  F (36.7  C) (Oral)   Resp 17   Ht 1.6 m (5' 3\")   Wt 56.3 kg (124 lb 1.9 oz)   SpO2 97%   BMI 21.99 kg/m          "

## 2024-01-19 NOTE — PROGRESS NOTES
"Speech-Language Pathology:  Clinical Swallow Evaluation and Speech Language Cognitive Evaluation     01/19/24 1300   Appointment Info   Signing Clinician's Name / Credentials (SLP) Bronwyn Moss MA, CCC-SLP   General Information   Onset of Illness/Injury or Date of Surgery 01/18/24   Referring Physician Gondal, Saad J, MD   Pertinent History of Current Problem Per ordering provider \"Jessica Cruz is a 83 year old female admitted on 1/18/2024. She presented to the ER with complaint of slurring of speech and right facial droop.  As per patient and  at bedside patient was having some slurring of speech and funny speech yesterday initially disregarded as having a sinus infection as whenever patient has sinus infection she has some slurring of speech, this morning the slurring of speech worsened and when patient went to the rock her rock told her that her face is asymmetric and lip is drooping downwards and advised her to go to the hospital, patient called PCP who also advised to come immediately to the hospital, in the ER vitals were fairly within normal limits other than elevated blood pressure, labs show elevated bicarb most likely in setting of contraction, troponins trended and are flat, patient also seem to be anemic HEAD CT: No evidence for acute hemorrhage, mass effect, or acute vascular territorial infarction. Consider MRI for further evaluation, as clinically appropriate. Age-related change. HEAD CTA:  No evidence for proximal large vessel occlusion or flow-limiting stenosis. NECK CTA:  No evidence of hemodynamically significant stenosis based on NASCET criteria. No evidence for dissection.  CT head perfusion showed No evidence of core infarction or regional hypoperfusion.  MRI of the brain was ordered which showed Findings suspicious for a focus of acute ischemic change involving the left posterior corona radiata without hemorrhagic conversion.  ED consulted stroke neurology who recommended brain " "MRI and s/p brain MRI recommended loading with Plavix 300 mg and aspirin 325 mg followed by continuing Plavix 75 and aspirin 81 mg daily for 21 days followed by aspirin 325 along and admit patient for stroke workup and consult neurology patient is going to be admitted for acute CVA  \".   General Observations Alert and cooperative   Type of Evaluation   Type of Evaluation Swallow Evaluation;Speech, Language, Cognition   Oral Motor   Oral Musculature generally intact   Mucosal Quality good   Dentition (Oral Motor)   Dentition (Oral Motor) adequate dentition   Facial Symmetry (Oral Motor)   Facial Symmetry (Oral Motor) right side impairment   Right Side Facial Asymmetry minimal impairment   Lip Function (Oral Motor)   Lip Range of Motion (Oral Motor) retraction impairment   Lip Strength (Oral Motor) WNL   Retraction, Lip Range of Motion right side;minimal impairment   Tongue Function (Oral Motor)   Tongue Strength (Oral Motor) WNL   Tongue Coordination/Speed (Oral Motor) WNL   Tongue ROM (Oral Motor) protrusion is impaired   Comment, Tongue Function (Oral Motor) lateralization with WNL despite slight deviation to the right with protrusion.   Protrusion, Tongue ROM Impairment (Oral Motor) right side  (slight deviation right)   Jaw Function (Oral Motor)   Jaw Function (Oral Motor) clicking/popping audible   Cough/Swallow/Gag Reflex (Oral Motor)   Soft Palate/Velum (Oral Motor) WNL   Volitional Throat Clear/Cough (Oral Motor) WNL   Volitional Swallow (Oral Motor) WNL   Vocal Quality/Secretion Management (Oral Motor)   Vocal Quality (Oral Motor) WNL   Secretion Management (Oral Motor) WNL   General Swallowing Observations   Past History of Dysphagia None per EMR, RN.   Comment, General Swallowing Observations Pt reports occasional droopling with liquids or saliva. She feels she has excessive saliva. Encouraged her to complete volitional swallows of saliva throughout the day. She reports some difficulty swallowing mixed " consistencies.   Respiratory Support room air   Current Diet/Method of Nutritional Intake (General Swallowing Observations, NIS) regular diet;thin liquids (level 0)   Swallowing Evaluation Clinical swallow evaluation   Clinical Swallow Evaluation   Clinical Swallow Evaluation Textures Trialed thin liquids;solid foods   Clinical Swallow Eval: Thin Liquid Texture Trial   Mode of Presentation, Thin Liquids cup;straw   Volume of Liquid or Food Presented 3oz   Oral Phase of Swallow WFL   Pharyngeal Phase of Swallow intact   Diagnostic Statement No overt s/s aspiration   Clinical Swallow Evaluation: Solid Food Texture Trial   Mode of Presentation self-fed   Volume Presented 1 cracker   Oral Phase WFL   Pharyngeal Phase intact   Diagnostic Statement No overt s/s aspiration   Esophageal Phase of Swallow   Patient reports or presents with symptoms of esophageal dysphagia No   Swallowing Recommendations   Diet Consistency Recommendations regular diet;thin liquids (level 0)   Recommended Feeding/Eating Techniques (Swallow Eval) maintain upright sitting position for eating   Medication Administration Recommendations, Swallowing (SLP) Per patient preference   Instrumental Assessment Recommendations instrumental evaluation not recommended at this time   Comment, Swallowing Recommendations Patient appears to be at low risk for aspiration when eating/drinking.   Motor Speech   Vocal Loudness (Motor Speech) WNL   Speech Intelligibility (Motor Speech) word level;conversational level   Speech Fluency (Motor Speech) WNL   Articulation (Motor Speech) errors with increasing word length;imprecise articulation   Respiration (motor speech) None   Word Level, Speech Intelligibility (Motor Speech) intact   Conversational Level, Speech Intelligibility (Motor Speech) minimal impairment   Auditory Comprehension   Follows Commands (Auditory Comprehension) WFL   Yes/No Questions (Auditory Comprehension) WFL   Verbal Expression   Confrontational  Naming (Verbal Expression) WNL;objects   Conversational Speech (Verbal Expression) WNL   Automatic Speech (Verbal Expression) WFL;counting   Responsive Naming (Verbal Expression) WNL   Narrative Speech (Verbal Expression) WNL   Repetition Skills (Verbal Expression) WNL   Word Finding Skills (Verbal Expression) generative naming;WNL   Pragmatic Language   Nonverbal Skills (Pragmatic Language) WNL   Reading Comprehension   Oral Reading Ability (Reading Comprehension) WNL   Functional Reading Tasks (Reading Comprehension) WNL   Comprehension Level (Reading Comprehension) WNL   Cognition   Cognitive Status Exam Comments Pt recalled 2/2 objects hidden after a 4 minute delay with distractor tasks.   Orientation Status (Cognition) oriented x 4   Affect/Mental Status (Cognition) WNL   General Therapy Interventions   Planned Therapy Interventions Communication   Communication Improve speech intelligibility   Clinical Impression   Criteria for Skilled Therapeutic Interventions Met (SLP Eval) Yes, treatment indicated   SLP Diagnosis Dysarthria   Risks & Benefits of therapy have been explained evaluation/treatment results reviewed;care plan/treatment goals reviewed;participants voiced agreement with care plan;participants included;patient   Clinical Impression Comments Patient participated in Clinical Swallow Evaluation. No s/s aspiration with any intake. Oral motor was notable for right side labial droop and lingual deviation right with protrusion. This did not impact safety of swallow. Pt has good awareness of intermittent drooling that was reported but not observed. Mastication was adequate. Hyolaryngeal movement was present. Recommend diet listed above. Speech, Language, and Cognitive Evalaution completed. Pt has intact verbal expression, comprehension, and cognition. Mild dysarthria, more significant in conversation. Pt demonstrated ability to use strategies with training and prompts. Ongoing SLP services warranted.   SLP  Total Evaluation Time   Eval: oral/pharyngeal swallow function, clinical swallow Minutes (75609) 20   SLP Goals   Therapy Frequency (SLP Eval) 3 times/week   SLP Predicted Duration/Target Date for Goal Attainment 01/26/24   SLP Goals Communication   SLP: Communicate basic wants and needs verbally;independent   SLP Discharge Planning   SLP Discharge Recommendation home with outpatient therapy services   SLP Rationale for DC Rec OP SLP recommended   SLP Brief overview of current status  Recommend regular textures and thin liquids. Continue SLP services at d/c to address dysarthria.   Total Session Time   Total Session Time (sum of timed and untimed services) 20

## 2024-01-19 NOTE — PROGRESS NOTES
01/19/24 0830   Appointment Info   Signing Clinician's Name / Credentials (PT) Regine Joiner DPT   Living Environment   People in Home spouse   Current Living Arrangements house  ([facility])   Home Accessibility no concerns   Transportation Anticipated family or friend will provide   Self-Care   Usual Activity Tolerance good   Current Activity Tolerance moderate   Equipment Currently Used at Home cane, straight   Fall history within last six months yes   Number of times patient has fallen within last six months 1   General Information   Onset of Illness/Injury or Date of Surgery 01/18/24   Referring Physician Gondal, Saad J, MD   Patient/Family Therapy Goals Statement (PT) none   Pertinent History of Current Problem (include personal factors and/or comorbidities that impact the POC) 83-year-old female with CAD, HLD admitted with slurred speech along with facial droop  Patient was not a candidate for thrombolytics due to time of onset contraindication    CT of the head, CTA and CT perfusion unremarkable and did not show any large vessel occlusion    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   Strength (Manual Muscle Testing)   Strength (Manual Muscle Testing) Deficits observed during functional mobility   Strength Comments Pt has hx of L foot drop.   Bed Mobility   Bed Mobility supine-sit   Supine-Sit Joiner (Bed Mobility) modified independence   Assistive Device (Bed Mobility) bed rails   Transfers   Transfers sit-stand transfer   Sit-Stand Transfer   Sit-Stand Joiner (Transfers) supervision;verbal cues   Assistive Device (Sit-Stand Transfers) cane, straight   Gait/Stairs (Locomotion)   Joiner Level (Gait) contact guard   Assistive Device (Gait) cane, straight   Distance in Feet (Gait) 15'   Pattern (Gait) step-through   Deviations/Abnormal Patterns (Gait) gait speed decreased   Clinical Impression   Criteria for Skilled Therapeutic Intervention Yes, treatment indicated   PT Diagnosis  (PT) Impaired functional mobility   Influenced by the following impairments Weakness, balance deficits   Functional limitations due to impairments Impaired gait, strength   Clinical Presentation (PT Evaluation Complexity) stable   Clinical Presentation Rationale Presents as diagnosed   Clinical Decision Making (Complexity) moderate complexity   Planned Therapy Interventions (PT) balance training;gait training;transfer training;home exercise program;strengthening   Risk & Benefits of therapy have been explained patient   PT Total Evaluation Time   PT Eval, Moderate Complexity Minutes (75116) 10   Physical Therapy Goals   PT Frequency Daily   PT Predicted Duration/Target Date for Goal Attainment 01/26/24   PT Goals Transfers;Gait   PT: Transfers Modified independent;Sit to/from stand;Bed to/from chair;Assistive device   PT: Gait Modified independent;Straight cane;150 feet   PT Discharge Planning   PT Plan progress transfers, amb with SEC, balance HEP   PT Discharge Recommendation (DC Rec) home with assist;home with home care physical therapy   PT Rationale for DC Rec Pt already has home PT, would benefit to continue at d/c.   PT Brief overview of current status Pt amb 150'x2 with CGA and SEC.   PT Equipment Needed at Discharge cane, straight   Total Session Time   Total Session Time (sum of timed and untimed services) 10       Regine Joiner, PT, DPT  1/19/2024

## 2024-01-19 NOTE — CONSULTS
NEUROLOGY INPATIENT CONSULTATION NOTE       [facility]  [address] [place] [address]  Tel: [phone]  Fax: [phone]  [email or web address]     Jessica Cruz,  1940, MRN [id number]  PCP: No Ref-Primary, Physician  Date: 2024     ASSESSMENT & PLAN     Diagnosis code: Cerebral infarction    Left corona radiata infarction  83-year-old female with CAD, HLD admitted with slurred speech along with facial droop  Patient was not a candidate for thrombolytics due to time of onset contraindication  CT of the head, CTA and CT perfusion unremarkable and did not show any large vessel occlusion  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  Echocardiogram shows a normal ejection fraction with a negative bubble study.  Aortic root dilatation and 1+ aortic regurgitation noted.  Dual antiplatelet therapy with aspirin and Plavix for 90 days.  Afterwards switch to enteric-coated aspirin 325 mg daily.  LDL is 76, currently patient is on Lipitor 10 mg daily, increase the dose to 40 mg daily with goal of LDL between 40-70  Troponin borderline elevated and normal hemoglobin A1c  Physical, occupational and speech therapy consult  Stroke education provided    Thank you again for this referral, please feel free to contact me if you have any questions.    Marcus Webb MD  [facility]  (Formerly, [facility]     CHIEF COMPLAINT Cerebrovascular accident (CVA), unspecified mechanism (H)     HISTORY OF PRESENT ILLNESS     We have been requested by Dr. Carlos to evaluate Jessica Cruz who is a 83 year old  female for CVA    Patient is a 83-year-old female with history of CAD, HLD who presented to the emergency room with complaint of slurred speech along with right facial droop.  According to family members her speech was somewhat slurred starting day before yesterday but initially they thought it was sinus infection..   She went to her rock who told her that her face were asymmetric and her lips were drooping and she eventually came to the emergency room.  CT of the head and CTA head and neck was done that did not show any acute ischemia or any large vessel occlusion.  CT perfusion was also negative.  Subsequently MRI was done that showed left posterior corona radiata infarction.  Echocardiogram showed normal ejection fraction with a negative bubble study but patient had 1+ aortic regurgitation With aortic root dilatation.     PROBLEM LIST      Patient Active Problem List   Diagnosis Code     HNP (herniated nucleus pulposus) [id number]     Sciatica M54.30     Gross hematuria R31.0     Subacute cough R05.2     Fever, unspecified fever cause R50.9     Arthralgia, unspecified joint M25.50     Acquired hypothyroidism E03.9     Anemia of chronic disease D63.8     CAD -- S/P ELLIE x 1 in 2018 for Angina I25.119     Hyperlipidemia E78.5     Foot drop, left foot M21.372     Hyponatremia E87.1     Hx of Colon CA in 2011 C18.9     Acute cystitis with hematuria N30.01     Hemoptysis R04.2     Generalized muscle weakness M62.81     Acute CVA -- Rght face droop and slurred speech I63.9     Elevated troponin R79.89      Clinically Significant Risk Factors Present on Admission                  # Drug Induced Platelet Defect: home medication list includes an antiplatelet medication                  PAST MEDICAL & SURGICAL HISTORY     Past Medical History: Patient  has a past medical history of Aortic regurgitation, CAD, S/P stent (2018), Colon cancer (H) (2011), Diverticulosis, History of anesthesia complications, Hyperlipidemia, and PONV (postoperative nausea and vomiting).    Past Surgical History: She  has a past surgical history that includes Angioplasty and PARTIAL HIP REPLACEMENT (Left, 6/2/2020).     SOCIAL HISTORY     Reviewed, and she  reports that she has quit smoking. She has never used smokeless tobacco. She reports that she does not  "currently use alcohol after a past usage of about 7.0 standard drinks of alcohol per week. She reports that she does not use drugs.     FAMILY HISTORY     Reviewed, and family history is not on file.     ALLERGIES     Allergies   Allergen Reactions     Ace Inhibitors Other (See Comments)     Angioedema per Dr Сергей Krishnamurthy., Pt developed facial bruising and headache after taking lisinopil., See 6/27/16 telephone encounter, Lisinopril , Angioedema per Dr Сергей Krishnamurthy., Pt developed facial bruising and headache after taking lisinopil., See 6/27/16 telephone encounter, Angioedema per Dr Сергей Krishnamurthy., Pt developed facial bruising and headache after taking lisinopil., See 6/27/16 telephone encounter     Latex Unknown and Rash     \"rash with latex gloves\"     Adhesive Tape-Silicones [Adhesive Tape] Dermatitis     Severe Blisters wherever adhesive touches, Her skin. , Paper tape is OK     Amoxicillin Other (See Comments)     Pt states oral amoxicillin \"doesn't work, the infection keeps coming back\"     Pravastatin Unknown     Leg cramps     Prinivil [Lisinopril] Unknown     bruising     Senna Other (See Comments)     Colon inflamation     Temazepam Other (See Comments)     Balance problem,s     Trazodone Unknown     Tongue swelling; anxiety     Alendronate [Alendronate] Unknown and Other (See Comments)     \"constipated\", \"constipated\"     Flonase [Fluticasone] Rash     Metoprolol Succinate [Metoprolol] Rash     Simvastatin Rash     Sulfa (Sulfonamide Antibiotics) [Sulfa Antibiotics] Rash        REVIEW OF SYSTEMS     Pertinent items are noted in HPI.     HOME & HOSPITAL MEDICATIONS     Prior to Admission Medications  Medications Prior to Admission   Medication Sig Dispense Refill Last Dose     acetaminophen (TYLENOL) 325 MG tablet Take 2 tablets (650 mg) by mouth every 4 hours as needed for mild pain or other (and adjunct with moderate or severe pain or per patient request) 10 tablet 0 Past Week at prn     albuterol (PROAIR " HFA/PROVENTIL HFA/VENTOLIN HFA) 108 (90 Base) MCG/ACT inhaler Inhale 1 puff into the lungs every 4 hours as needed        aspirin (ASA) 81 MG chewable tablet Take 81 mg by mouth every evening   1/17/2024 at pm     atorvastatin (LIPITOR) 10 MG tablet [ATORVASTATIN (LIPITOR) 10 MG TABLET] Take 10 mg by mouth at bedtime.   1/17/2024 at pm     calcium carbonate-vitamin D (CALTRATE) 600-10 MG-MCG per tablet Take 1 tablet by mouth daily (with lunch)   1/17/2024 at 1200     carboxymethylcellulose PF (REFRESH PLUS) 0.5 % ophthalmic solution Place 1 drop into both eyes 4 times daily as needed for dry eyes   Past Week at prn     cholecalciferol 50 MCG (2000 UT) tablet Take 2,000 Units by mouth daily (with lunch)   1/17/2024 at 1200     coenzyme Q10 (CO Q-10) 100 mg capsule Take 100 mg by mouth daily (with lunch)   1/17/2024 at 1200     fish oil-omega-3 fatty acids 1000 MG capsule Take 1 g by mouth daily (with lunch)   1/17/2024 at 1200     isosorbide mononitrate (IMDUR) 30 MG 24 hr tablet Take 30 mg by mouth every evening   1/17/2024 at pm     Lactobacillus rhamnosus GG (CULTURELLE) 10-15 Billion cell capsule Take 1 capsule by mouth daily (with lunch)   1/17/2024 at 1200     levothyroxine (SYNTHROID, LEVOTHROID) 88 MCG tablet Take 88 mcg by mouth every morning   1/18/2024 at am     multivitamin therapeutic tablet Take 1 tablet by mouth daily (with lunch)   1/17/2024 at 1200     polyethylene glycol (MIRALAX) 17 g packet Take 1 packet by mouth every morning   1/18/2024 at am     polyethylene glycol-propylene glycol (SYSTANE ULTRA) 0.4-0.3 % SOLN ophthalmic solution Place 1 drop into both eyes 4 times daily as needed for dry eyes   Past Week at prn     predniSONE (DELTASONE) 10 MG tablet Take 5 mg by mouth every morning   1/18/2024 at am     psyllium (METAMUCIL) 3.4 gram packet [PSYLLIUM (METAMUCIL) 3.4 GRAM PACKET] Take 2 packets by mouth daily.  0 1/18/2024 at am     sodium chloride 1 GM tablet Take 0.5 tablets (0.5 g) by  mouth 2 times daily (with meals) 2 tablet 0 1/18/2024 at am     nitroglycerin (NITROSTAT) 0.4 MG SL tablet [NITROGLYCERIN (NITROSTAT) 0.4 MG SL TABLET] Place 0.4 mg under the tongue every 5 (five) minutes as needed for chest pain.   n/a at n/a       Hospital Medications    aspirin  81 mg Oral Daily    Or     aspirin  81 mg Oral or NG Tube Daily     atorvastatin  40 mg Oral QPM     clopidogrel  75 mg Oral or NG Tube Daily     levothyroxine  88 mcg Oral QAM     predniSONE  5 mg Oral QAM     sodium chloride (PF)  3 mL Intracatheter Q8H     sodium chloride  0.5 g Oral BID w/meals        PHYSICAL EXAM     Vital signs  Temp:  [97.5  F (36.4  C)-98.6  F (37  C)] 98  F (36.7  C)  Pulse:  [63-86] 77  Resp:  [11-35] 17  BP: (145-172)/() 152/70  SpO2:  [89 %-99 %] 97 %    General Physical Exam: Patient is alert and oriented x 3. Vital signs were reviewed and are documented in EMR. Neck was supple, no carotid bruit, thyromegaly, JVD or lymphadenopathy noted.  Neurological Exam:  Patient is alert and oriented x 3 no acute distress.  Speech is slurred.  Able to name objects parts of objects written and verbal comprehension intact.  She has right pronator drift strength on the right 5 -/5 on the left 5/5 reflexes 1+ with downgoing toe on the left, upgoing on the right.  Sensation was grossly intact minimal dysmetria on finger-nose testing gait testing deferred.     DIAGNOSTIC STUDIES     Pertinent Radiology   Radiology Results: Reviewed impression and images     CT  HEAD CT:  1.  No evidence for acute hemorrhage, mass effect, or acute vascular territorial infarction. Consider MRI for further evaluation, as clinically appropriate.  2.  Age-related change.     HEAD CTA:   1.  No evidence for proximal large vessel occlusion or flow-limiting stenosis.     NECK CTA:  1.  No evidence of hemodynamically significant stenosis based on NASCET criteria.  2.  No evidence for dissection.  CT PERFUSION  1. No evidence of core infarction  or regional hypoperfusion.     MRI  1.  Findings suspicious for a focus of acute ischemic change involving the left posterior corona radiata without hemorrhagic conversion.     Echocardiogram  Left ventricular size, wall motion and function are normal. The ejection  fraction is 60-65%.  Normal right ventricle size and systolic function.  Aortic root and ascending dilatation is present.  There is mild to moderate (1-2+) aortic regurgitation.  A contrast injection (Bubble Study) was performed that was negative for flow  across the interatrial septum.  A Valsalva maneuver was performed.    Pertinent Labs   Lab Results: Personally Reviewed   Recent Results (from the past 24 hour(s))   Glucose by meter    Collection Time: 01/18/24 11:34 AM   Result Value Ref Range    GLUCOSE BY METER POCT 93 70 - 99 mg/dL   INR    Collection Time: 01/18/24 11:50 AM   Result Value Ref Range    INR 1.05 0.85 - 1.15   Partial thromboplastin time    Collection Time: 01/18/24 11:50 AM   Result Value Ref Range    aPTT 27 22 - 38 Seconds   Basic metabolic panel    Collection Time: 01/18/24 11:51 AM   Result Value Ref Range    Sodium 136 135 - 145 mmol/L    Potassium 3.4 3.4 - 5.3 mmol/L    Chloride 99 98 - 107 mmol/L    Carbon Dioxide (CO2) 32 (H) 22 - 29 mmol/L    Anion Gap 5 (L) 7 - 15 mmol/L    Urea Nitrogen 13.1 8.0 - 23.0 mg/dL    Creatinine 0.60 0.51 - 0.95 mg/dL    GFR Estimate 88 >60 mL/min/1.73m2    Calcium 9.3 8.8 - 10.2 mg/dL    Glucose 107 (H) 70 - 99 mg/dL   Troponin T, High Sensitivity    Collection Time: 01/18/24 11:51 AM   Result Value Ref Range    Troponin T, High Sensitivity 15 (H) <=14 ng/L   CBC with platelets and differential    Collection Time: 01/18/24 11:51 AM   Result Value Ref Range    WBC Count 7.4 4.0 - 11.0 10e3/uL    RBC Count 3.57 (L) 3.80 - 5.20 10e6/uL    Hemoglobin 10.1 (L) 11.7 - 15.7 g/dL    Hematocrit 30.7 (L) 35.0 - 47.0 %    MCV 86 78 - 100 fL    MCH 28.3 26.5 - 33.0 pg    MCHC 32.9 31.5 - 36.5 g/dL    RDW  16.5 (H) 10.0 - 15.0 %    Platelet Count 237 150 - 450 10e3/uL    % Neutrophils 67 %    % Lymphocytes 22 %    % Monocytes 8 %    % Eosinophils 2 %    % Basophils 0 %    % Immature Granulocytes 1 %    NRBCs per 100 WBC 0 <1 /100    Absolute Neutrophils 5.1 1.6 - 8.3 10e3/uL    Absolute Lymphocytes 1.6 0.8 - 5.3 10e3/uL    Absolute Monocytes 0.6 0.0 - 1.3 10e3/uL    Absolute Eosinophils 0.1 0.0 - 0.7 10e3/uL    Absolute Basophils 0.0 0.0 - 0.2 10e3/uL    Absolute Immature Granulocytes 0.0 <=0.4 10e3/uL    Absolute NRBCs 0.0 10e3/uL   Hemoglobin A1c    Collection Time: 01/18/24 11:51 AM   Result Value Ref Range    Hemoglobin A1C 5.0 <5.7 %   Lipid panel reflex to direct LDL: Non-fasting    Collection Time: 01/18/24 11:51 AM   Result Value Ref Range    Cholesterol 178 <200 mg/dL    Triglycerides 71 <150 mg/dL    Direct Measure HDL 88 >=50 mg/dL    LDL Cholesterol Calculated 76 <=100 mg/dL    Non HDL Cholesterol 90 <130 mg/dL   Iron & Iron Binding Capacity    Collection Time: 01/18/24 11:51 AM   Result Value Ref Range    Iron 57 37 - 145 ug/dL    Iron Binding Capacity 251 240 - 430 ug/dL    Iron Sat Index 23 15 - 46 %   Reticulocyte count    Collection Time: 01/18/24 11:51 AM   Result Value Ref Range    % Reticulocyte 1.5 0.8 - 2.7 %    Absolute Reticulocyte 0.051 0.010 - 0.110 10e6/uL   Troponin T, High Sensitivity    Collection Time: 01/18/24  2:38 PM   Result Value Ref Range    Troponin T, High Sensitivity 15 (H) <=14 ng/L   Echocardiogram Complete - For age > 60 yrs    Collection Time: 01/18/24  4:33 PM   Result Value Ref Range    LVEF  60-65%    Troponin T, High Sensitivity    Collection Time: 01/18/24  4:38 PM   Result Value Ref Range    Troponin T, High Sensitivity 14 <=14 ng/L   Glucose by meter    Collection Time: 01/18/24  5:22 PM   Result Value Ref Range    GLUCOSE BY METER POCT 94 70 - 99 mg/dL   Glucose by meter    Collection Time: 01/19/24 12:06 AM   Result Value Ref Range    GLUCOSE BY METER POCT 81 70 -  99 mg/dL   CBC with platelets    Collection Time: 01/19/24  6:38 AM   Result Value Ref Range    WBC Count 6.0 4.0 - 11.0 10e3/uL    RBC Count 3.47 (L) 3.80 - 5.20 10e6/uL    Hemoglobin 9.8 (L) 11.7 - 15.7 g/dL    Hematocrit 29.8 (L) 35.0 - 47.0 %    MCV 86 78 - 100 fL    MCH 28.2 26.5 - 33.0 pg    MCHC 32.9 31.5 - 36.5 g/dL    RDW 16.3 (H) 10.0 - 15.0 %    Platelet Count 242 150 - 450 10e3/uL   Basic metabolic panel    Collection Time: 01/19/24  6:38 AM   Result Value Ref Range    Sodium 139 135 - 145 mmol/L    Potassium 3.7 3.4 - 5.3 mmol/L    Chloride 105 98 - 107 mmol/L    Carbon Dioxide (CO2) 26 22 - 29 mmol/L    Anion Gap 8 7 - 15 mmol/L    Urea Nitrogen 9.9 8.0 - 23.0 mg/dL    Creatinine 0.55 0.51 - 0.95 mg/dL    GFR Estimate 90 >60 mL/min/1.73m2    Calcium 8.5 (L) 8.8 - 10.2 mg/dL    Glucose 90 70 - 99 mg/dL   Magnesium    Collection Time: 01/19/24  6:38 AM   Result Value Ref Range    Magnesium 2.0 1.7 - 2.3 mg/dL   Phosphorus    Collection Time: 01/19/24  6:38 AM   Result Value Ref Range    Phosphorus 2.7 2.5 - 4.5 mg/dL       Total time spent for face to face visit, reviewing labs/imaging studies, counseling and coordination of care was: 1 Hour 30 Minutes More than 50% of this time was spent on counseling and coordination of care.    This note was dictated using voice recognition software.  Any grammatical or context distortions are unintentional and inherent to the software.

## 2024-01-19 NOTE — PROGRESS NOTES
Occupational Therapy      01/19/24 1000   Appointment Info   Signing Clinician's Name / Credentials (OT) Angela Urena OTR/L   Living Environment   People in Home spouse   Current Living Arrangements house  ([facility])   Home Accessibility no concerns   Transportation Anticipated family or friend will provide   Living Environment Comments W/I shower no concerns   Self-Care   Usual Activity Tolerance good   Current Activity Tolerance moderate   Regular Exercise No   Equipment Currently Used at Home cane, straight   Fall history within last six months yes   Number of times patient has fallen within last six months 1   Activity/Exercise/Self-Care Comment Ind. w/ ADL routine   Instrumental Activities of Daily Living (IADL)   IADL Comments Ind. w/ most of IADL tasks spouse assists as needed   General Information   Onset of Illness/Injury or Date of Surgery 01/18/24   Referring Physician Austin Carlos MD   Additional Occupational Profile Info/Pertinent History of Current Problem 83-year-old female with CAD, HLD admitted with slurred speech along with facial droop-  CT of the head, CTA and CT perfusion unremarkable and did not show any large vessel occlusion    MRI brain shows a left corona radiata infarction   Existing Precautions/Restrictions fall   Cognitive Status Examination   Orientation Status orientation to person, place and time   Range of Motion Comprehensive   General Range of Motion bilateral upper extremity ROM WFL   Strength Comprehensive (MMT)   General Manual Muscle Testing (MMT) Assessment no strength deficits identified   Bed Mobility   Bed Mobility No deficits identified   Transfers   Transfers sit-stand transfer   Sit-Stand Transfer   Sit-Stand [place] (Transfers) verbal cues;supervision   Assistive Device (Sit-Stand Transfers) cane, straight   Balance   Balance Assessment standing dynamic balance   Standing Balance: Dynamic verbal cues;supervision   Position/Device Used, Standing Balance  cane, straight   Activities of Daily Living   BADL Assessment/Intervention lower body dressing   Lower Body Dressing Assessment/Training   [place] Level (Lower Body Dressing) modified independence   Clinical Impression   Criteria for Skilled Therapeutic Interventions Met (OT) Yes, treatment indicated   OT Diagnosis decreased ADL ind.   OT Problem List-Impairments impacting ADL problems related to;activity tolerance impaired;balance;strength;post-surgical precautions;mobility   Assessment of Occupational Performance 1-3 Performance Deficits   Planned Therapy Interventions (OT) ADL retraining;transfer training;strengthening;home program guidelines;progressive activity/exercise   Clinical Decision Making Complexity (OT) problem focused assessment/low complexity   Risk & Benefits of therapy have been explained evaluation/treatment results reviewed;patient   OT Total Evaluation Time   OT Eval, Low Complexity Minutes (32963) 10   OT Goals   Therapy Frequency (OT) One time eval and treatment   OT Predicted Duration/Target Date for Goal Attainment 01/19/24   OT Goals Hygiene/Grooming;Transfers;Toilet Transfer/Toileting   OT: Hygiene/Grooming modified independent;using adaptive equipment   OT: Transfer Modified independent;with assistive device   OT: Toilet Transfer/Toileting Modified independent;using adaptive equipment   Self-Care/Home Management   Self-Care/Home Mgmt/ADL, Compensatory, Meal Prep Minutes (64220) 9   Treatment Detail/Skilled Intervention Pt up in room upon arrival- pt ambulating w/ Supervision w/ cane no LOB cues for safety as pt does have IV and OT encouraged pt use call light for ambulation to decreased fall risk/risk of pulling IV out by accident. Pt completed g/h at sink w/ SBA no LOB no device, OT noted slight R. facial droop w/ numbness MD aware as well. OT provided pt and spouse ed. on various tips to enhance safety w/in home environment upon d/c home. Pt moving well no further skilled OT needs.    OT Discharge Planning   OT Plan dc OT   OT Discharge Recommendation (DC Rec) home with assist   OT Rationale for DC Rec Pt moving well w/ cane no LOB noted, cues for safety- pt does live in supportive home environment w/ spouse. No further skilled OT needs at this time.   Total Session Time   Timed Code Treatment Minutes 9   Total Session Time (sum of timed and untimed services) 19

## 2024-01-19 NOTE — PROGRESS NOTES
Care Management Discharge Note    Discharge Date: 01/19/2024       Discharge Disposition: Home    Discharge Services: Home Care    Discharge DME: None    Discharge Transportation: family or friend will provide    Private pay costs discussed: Not applicable    Does the patient's insurance plan have a 3 day qualifying hospital stay waiver?  No    PAS Confirmation Code: N/A  Patient/family educated on Medicare website which has current facility and service quality ratings: no    Education Provided on the Discharge Plan: Yes  Persons Notified of Discharge Plans: per team  Patient/Family in Agreement with the Plan: yes    Handoff Referral Completed: Yes    Additional Information:  3:56 PM  Plan is for Pt to discharge home with family. Pt is open to [facility] for home PT. Pt will continue services after discharge. Family will transport at discharge. No further CM needs requested or anticipated for discharge.     CM will sign off. Please contact CM if any additional needs arise prior to discharge.      EWELINA To

## 2024-01-19 NOTE — PLAN OF CARE
Occupational Therapy Discharge Summary    Reason for therapy discharge:    All goals and outcomes met, no further needs identified.    Progress towards therapy goal(s). See goals on Care Plan in [facility] electronic health record for goal details.  Goals met    Therapy recommendation(s):    No further therapy is recommended. Home w/ support/supervision from spouse, no further skilled OT needs. Pt completing ADL routine jovi.     Angela Urena, JEFFY, OTR/L, 1/19/2024, 11:10 AM

## 2024-01-20 NOTE — PLAN OF CARE
Physical Therapy Discharge Summary    Reason for therapy discharge:    Discharged to home with home therapy.    Progress towards therapy goal(s). See goals on Care Plan in [facility] electronic health record for goal details.  Goals not met.  Barriers to achieving goals:   discharge on same date as initial evaluation.    Therapy recommendation(s):    Continued therapy is recommended.  Rationale/Recommendations:  Continue with home PT.

## 2024-01-20 NOTE — PROGRESS NOTES
Speech Language Therapy Discharge Summary    Reason for therapy discharge:    Discharged to home with outpatient therapy.    Progress towards therapy goal(s). See goals on Care Plan in [facility] electronic health record for goal details.  Goals met    Therapy recommendation(s):    No further therapy is recommended. Recommend regular textures and thin liquids. Continue SLP services at d/c to address dysarthria.

## 2024-01-20 NOTE — PLAN OF CARE
Goal Outcome Evaluation:       Pt discharging home with , stroke book given to them, I gave them an overview, they said they'll read it at home.  IV discontinued, pt gathered all her belongings.  Pt dc'd in a wheelchair.  Blaire Bangura, RN

## 2024-01-22 LAB
ATRIAL RATE - MUSE: 69 BPM
DIASTOLIC BLOOD PRESSURE - MUSE: 97 MMHG
INTERPRETATION ECG - MUSE: NORMAL
P AXIS - MUSE: 66 DEGREES
PR INTERVAL - MUSE: 196 MS
QRS DURATION - MUSE: 156 MS
QT - MUSE: 428 MS
QTC - MUSE: 458 MS
R AXIS - MUSE: 3 DEGREES
SYSTOLIC BLOOD PRESSURE - MUSE: 167 MMHG
T AXIS - MUSE: 52 DEGREES
VENTRICULAR RATE- MUSE: 69 BPM

## 2024-04-05 ENCOUNTER — APPOINTMENT (OUTPATIENT)
Dept: MRI IMAGING | Facility: HOSPITAL | Age: 84
End: 2024-04-05
Attending: EMERGENCY MEDICINE
Payer: COMMERCIAL

## 2024-04-05 ENCOUNTER — HOSPITAL ENCOUNTER (EMERGENCY)
Facility: HOSPITAL | Age: 84
Discharge: HOME | End: 2024-04-06
Attending: EMERGENCY MEDICINE | Admitting: EMERGENCY MEDICINE
Payer: COMMERCIAL

## 2024-04-05 DIAGNOSIS — T50.905A MEDICATION REACTION, INITIAL ENCOUNTER: ICD-10-CM

## 2024-04-05 DIAGNOSIS — R10.13 EPIGASTRIC PAIN: ICD-10-CM

## 2024-04-05 DIAGNOSIS — R79.89 ELEVATED LFTS: ICD-10-CM

## 2024-04-05 PROCEDURE — 99285 EMERGENCY DEPT VISIT HI MDM: CPT | Mod: 25

## 2024-04-05 PROCEDURE — A9585 GADOBUTROL INJECTION: HCPCS | Performed by: EMERGENCY MEDICINE

## 2024-04-05 PROCEDURE — 74183 MRI ABD W/O CNTR FLWD CNTR: CPT

## 2024-04-05 PROCEDURE — 255N000002 HC RX 255 OP 636: Performed by: EMERGENCY MEDICINE

## 2024-04-05 RX ORDER — GADOBUTROL 604.72 MG/ML
6 INJECTION INTRAVENOUS ONCE
Status: COMPLETED | OUTPATIENT
Start: 2024-04-05 | End: 2024-04-05

## 2024-04-05 RX ADMIN — GADOBUTROL 6 ML: 604.72 INJECTION INTRAVENOUS at 23:16

## 2024-04-05 ASSESSMENT — ACTIVITIES OF DAILY LIVING (ADL): ADLS_ACUITY_SCORE: 36

## 2024-04-05 ASSESSMENT — COLUMBIA-SUICIDE SEVERITY RATING SCALE - C-SSRS
6. HAVE YOU EVER DONE ANYTHING, STARTED TO DO ANYTHING, OR PREPARED TO DO ANYTHING TO END YOUR LIFE?: NO
2. HAVE YOU ACTUALLY HAD ANY THOUGHTS OF KILLING YOURSELF IN THE PAST MONTH?: NO
1. IN THE PAST MONTH, HAVE YOU WISHED YOU WERE DEAD OR WISHED YOU COULD GO TO SLEEP AND NOT WAKE UP?: NO

## 2024-04-06 VITALS
BODY MASS INDEX: 21.61 KG/M2 | TEMPERATURE: 97.5 F | OXYGEN SATURATION: 99 % | RESPIRATION RATE: 18 BRPM | HEART RATE: 59 BPM | SYSTOLIC BLOOD PRESSURE: 185 MMHG | DIASTOLIC BLOOD PRESSURE: 91 MMHG | WEIGHT: 122 LBS

## 2024-04-06 LAB
HAV IGM SERPL QL IA: NONREACTIVE
HBV CORE IGM SERPL QL IA: NONREACTIVE
HBV SURFACE AG SERPL QL IA: NONREACTIVE
HCV AB SERPL QL IA: NONREACTIVE
INR PPP: 0.98 (ref 0.85–1.15)

## 2024-04-06 PROCEDURE — 80074 ACUTE HEPATITIS PANEL: CPT | Performed by: EMERGENCY MEDICINE

## 2024-04-06 PROCEDURE — 36415 COLL VENOUS BLD VENIPUNCTURE: CPT | Performed by: EMERGENCY MEDICINE

## 2024-04-06 PROCEDURE — 85610 PROTHROMBIN TIME: CPT | Performed by: EMERGENCY MEDICINE

## 2024-04-06 ASSESSMENT — ACTIVITIES OF DAILY LIVING (ADL): ADLS_ACUITY_SCORE: 38

## 2024-04-06 NOTE — ED PROVIDER NOTES
EMERGENCY DEPARTMENT ENCOUNTER      NAME: Jessica Cruz  AGE: 83 year old female  YOB: 1940  MRN: [id number]  EVALUATION DATE & TIME: No admission date for patient encounter.    PCP: Maddy Pop    ED PROVIDER: Darcie Schulte DO      Chief Complaint   Patient presents with    Abdominal Pain         FINAL IMPRESSION:  1. Elevated LFTs    2. Medication reaction, initial encounter    3. Epigastric pain          ED COURSE & MEDICAL DECISION MAKING:    Pertinent Labs & Imaging studies reviewed. (See chart for details)  9:12 PM I met the patient and performed my initial interview and exam.  9:20 PM I spoke with CHRISTI. They recommended getting an MRCP.  11:50 PM Patient and  updated on results  12:22 AM I spoke with CHRISTI Hollis. Recommended holding the Tavneos and following up with her provider.     83 year old female presents to the Emergency Department for evaluation of epigastric abdominal pain.  Pain started a couple days ago, however may have been present a few days ago as well.  Patient noted dark-colored urine.  No fevers.  Did have chills overnight a couple nights ago.  She went to the urgency room.  Labs significant for elevation in LFTs.  Bilirubin 3.4, Alk phos 412, , .  Lipase 159.   WBC 5.1, hgb 11.7.  INR drawn here is not elevated.  Right upper quadrant ultrasound was performed without any signs of stone or common bile duct dilatation.  Findings suggestive of hepatic fibrosis otherwise unremarkable.  Patient denies any daily alcohol use.  Intermittent Tylenol.  Denies cancer history however report of colon cancer in 2011 in chart review.  I discussed with GI who recommends an MRCP.  Patient does note she was started on a medication tavneos around a month and a half ago related to her vasculitis.  She reports she had to have hepatitis and liver testing prior to starting this.  When I look at potential side effects of this drug, this involves hepatocellular injury.   MRCP shows mild surface nodularity of the liver with increased diffusion restriction, highly suspicious for cirrhosis.  Otherwise no acute findings.  I circled back with GI.  Likely nothing acute they would do in the hospital.  Patient otherwise feeling well could discharge.  We discussed her medication and would recommend holding this and following up with her prescriber and trending her LFTs.  Recommend adding on an acute hepatitis panel to further evaluate, this was ordered.  I discussed with patient and  and they are agreeable with this plan of care.  They are eager to discharge.  We discussed symptomatic care for home.  Encouraged return if any acute worsening symptoms, unable to keep anything down by mouth, fever or any other concerns.      At the conclusion of the encounter I discussed the results of all of the tests and the disposition. The questions were answered. The patient or family acknowledged understanding and was agreeable with the care plan.     Medical Decision Making  Obtained supplemental history:Supplemental history obtained?: Family Member/Significant Other  Reviewed external records: External records reviewed?: Documented in chart and Inpatient Record: [facility] [place] visit from today (04/05/24) for abdominal pain  Care impacted by chronic illness:Chronic Kidney Disease, Heart Disease, and Hyperlipidemia  Care significantly affected by social determinants of health:Access to Medical Care  Did you consider but not order tests?: Work up considered but not performed and documented in chart, if applicable  Did you interpret images independently?: Independent interpretation of ECG and images noted in documentation, when applicable.  Consultation discussion with other provider:Did you involve another provider (consultant, , pharmacy, etc.)?: I discussed the care with another health care provider, see documentation for details.  Discharge. I recommended discontinuing  "prescription strength medication(s) as charted. I considered admission, but discharged the patient after share decision making conversation.      MEDICATIONS GIVEN IN THE EMERGENCY:  Medications   gadobutrol (GADAVIST) injection 6 mL (6 mLs Intravenous $Given 4/5/24 9937)       NEW PRESCRIPTIONS STARTED AT TODAY'S ER VISIT  Discharge Medication List as of 4/6/2024  1:03 AM             =================================================================    HPI    Patient information was obtained from: Patient and patient's     Use of : N/A       Jessica Cruz is a 83 year old female with a pertinent history of HLD, CAD, CKD II, and stroke who presents to this ED walking for evaluation of abdominal pain.    Patient states she started having some epigastric abdominal pain this past Wednesday (04/03/24). At the time she thought it could have been from some rhubarb she ate that day, but she has had continued pain since. She also noted her urine became darker and \"bertrand\" colored on Wednesday. Added her urine was a normal color for most of Thursday, but became dark again that night and stayed a dark yellow color through today. Denies fever, vomiting, or nausea. Mentioned she went to urgent care today (as noted below) and her liver function came back abnormal, so they recommended she present to the ED for further evaluation. Denies using any alcohol recently. Denies daily Tylenol use. No history of cancer. Endorses a history of vasculitis for which she started on Tavneos about 1.5 months ago. She also takes Prednisone 5 mg daily. Mentioned she has been having some night sweats, but stated she has had these for a while.     Of note, the patient has an appointment with [facility] this coming Friday (04/12/24).    Per chart review, patient was seen at [facility] [place] earlier today (04/05/24) for abdominal pain. US abdomen showed no acute abnormality, some findings suggestive of hepatic " fibrosis. Abnormal liver function labs. Recommended transfer to facility with MR and GI capacity. Recommended patient present to the ED for further evaluation. Patient and  agreeable to plan and left in stable condition.       REVIEW OF SYSTEMS   Per HPI    PAST MEDICAL HISTORY:  Past Medical History:   Diagnosis Date    Aortic regurgitation     CAD, S/P stent 2018    previous PCI    Colon cancer (H) 2011    Diverticulosis     History of anesthesia complications     Hyperlipidemia     PONV (postoperative nausea and vomiting)        PAST SURGICAL HISTORY:  Past Surgical History:   Procedure Laterality Date    ANGIOPLASTY      ZZC PARTIAL HIP REPLACEMENT Left 6/2/2020    Procedure: HEMIARTHROPLASTY, HIP, BIPOLAR LEFT;  Surgeon: Jorge Luis Aceves MD;  Location: [facility];  Service: Orthopedics           CURRENT MEDICATIONS:    acetaminophen (TYLENOL) 325 MG tablet  albuterol (PROAIR HFA/PROVENTIL HFA/VENTOLIN HFA) 108 (90 Base) MCG/ACT inhaler  aspirin (ASA) 81 MG chewable tablet  atorvastatin (LIPITOR) 40 MG tablet  calcium carbonate-vitamin D (CALTRATE) 600-10 MG-MCG per tablet  carboxymethylcellulose PF (REFRESH PLUS) 0.5 % ophthalmic solution  cholecalciferol 50 MCG (2000 UT) tablet  clopidogrel (PLAVIX) 75 MG tablet  coenzyme Q10 (CO Q-10) 100 mg capsule  fish oil-omega-3 fatty acids 1000 MG capsule  isosorbide mononitrate (IMDUR) 30 MG 24 hr tablet  Lactobacillus rhamnosus GG (CULTURELLE) 10-15 Billion cell capsule  levothyroxine (SYNTHROID, LEVOTHROID) 88 MCG tablet  multivitamin therapeutic tablet  nitroglycerin (NITROSTAT) 0.4 MG SL tablet  polyethylene glycol (MIRALAX) 17 g packet  polyethylene glycol-propylene glycol (SYSTANE ULTRA) 0.4-0.3 % SOLN ophthalmic solution  predniSONE (DELTASONE) 10 MG tablet  psyllium (METAMUCIL) 3.4 gram packet  sodium chloride 1 GM tablet         ALLERGIES:  Allergies   Allergen Reactions    Ace Inhibitors Other (See Comments)     Angioedema per Dr Сергей Krishnamurthy.,  "Pt developed facial bruising and headache after taking lisinopil., See 6/27/16 telephone encounter, Lisinopril , Angioedema per Dr Сергей Krishnamurthy., Pt developed facial bruising and headache after taking lisinopil., See 6/27/16 telephone encounter, Angioedema per Dr Сергей Krishnamurthy., Pt developed facial bruising and headache after taking lisinopil., See 6/27/16 telephone encounter    Latex Unknown and Rash     \"rash with latex gloves\"    Adhesive Tape-Silicones [Adhesive Tape] Dermatitis     Severe Blisters wherever adhesive touches, Her skin. , Paper tape is OK    Amoxicillin Other (See Comments)     Pt states oral amoxicillin \"doesn't work, the infection keeps coming back\"    Pravastatin Unknown     Leg cramps    Prinivil [Lisinopril] Unknown     bruising    Senna Other (See Comments)     Colon inflamation    Temazepam Other (See Comments)     Balance problem,s    Trazodone Unknown     Tongue swelling; anxiety    Alendronate [Alendronate] Unknown and Other (See Comments)     \"constipated\", \"constipated\"    Flonase [Fluticasone] Rash    Metoprolol Succinate [Metoprolol] Rash    Simvastatin Rash    Sulfa (Sulfonamide Antibiotics) [Sulfa Antibiotics] Rash       FAMILY HISTORY:  No family history on file.    SOCIAL HISTORY:   Social History     Socioeconomic History    Marital status:    Tobacco Use    Smoking status: Former    Smokeless tobacco: Never   Substance and Sexual Activity    Alcohol use: Not Currently     Alcohol/week: 7.0 standard drinks of alcohol    Drug use: Never       VITALS:  BP (!) 185/91   Pulse 59   Temp 97.5  F (36.4  C) (Temporal)   Resp 18   Wt 55.3 kg (122 lb)   SpO2 99%   BMI 21.61 kg/m      PHYSICAL EXAM    Physical Exam  Constitutional:       General: She is not in acute distress.  HENT:      Head: Normocephalic and atraumatic.      Mouth/Throat:      Pharynx: Oropharynx is clear.   Eyes:      General: Scleral icterus present.      Pupils: Pupils are equal, round, and reactive to " light.   Cardiovascular:      Rate and Rhythm: Normal rate and regular rhythm.      Pulses: Normal pulses.      Heart sounds: Normal heart sounds.   Pulmonary:      Effort: Pulmonary effort is normal.      Breath sounds: Normal breath sounds.   Abdominal:      General: Abdomen is flat. Bowel sounds are normal.      Palpations: Abdomen is soft.      Tenderness: There is abdominal tenderness in the epigastric area.   Musculoskeletal:         General: Normal range of motion.   Skin:     General: Skin is warm and dry.      Capillary Refill: Capillary refill takes less than 2 seconds.   Neurological:      General: No focal deficit present.      Mental Status: She is alert and oriented to person, place, and time.          LAB:  All pertinent labs reviewed and interpreted.  Labs Ordered and Resulted from Time of ED Arrival to Time of ED Departure   INR - Normal       Result Value    INR 0.98     ACUTE HEPATITIS PANEL       RADIOLOGY:  Reviewed all pertinent imaging. Please see official radiology report.  MR Abdomen MRCP w/o & w Contrast   Final Result   IMPRESSION:      1.  Mild surface nodularity of the liver with increased diffusion restriction, highly suspicious for cirrhosis.      2.  Stable moderate cardiomegaly.      3.  Mild colonic diverticulosis.            I, Georgette Page, am serving as a scribe to document services personally performed by Dr. Darcie Schulte based on my observation and the provider's statements to me. Darcie MCCOLLUM DO attest that Georgette Page is acting in a scribe capacity, has observed my performance of the services and has documented them in accordance with my direction.    Darcie Schulte DO  Emergency Medicine  [facility] EMERGENCY DEPARTMENT  [address] [place] [id number] [phone]  Dept: [phone]      Darcie Schulte DO  04/06/24 0429

## 2024-04-06 NOTE — ED TRIAGE NOTES
She comes from urgent care due to abdominal pain and dark urine. She had an US today and was told possible gall stone.

## 2024-09-08 ENCOUNTER — HEALTH MAINTENANCE LETTER (OUTPATIENT)
Age: 84
End: 2024-09-08

## 2024-10-02 ENCOUNTER — DOCUMENTATION ONLY (OUTPATIENT)
Dept: OTHER | Facility: CLINIC | Age: 84
End: 2024-10-02
Payer: COMMERCIAL

## 2025-04-07 ENCOUNTER — TRANSCRIBE ORDERS (OUTPATIENT)
Dept: OTHER | Age: 85
End: 2025-04-07

## 2025-04-07 DIAGNOSIS — H90.3 SENSORINEURAL HEARING LOSS, BILATERAL: Primary | ICD-10-CM

## 2025-04-10 ENCOUNTER — TRANSFERRED RECORDS (OUTPATIENT)
Dept: HEALTH INFORMATION MANAGEMENT | Facility: CLINIC | Age: 85
End: 2025-04-10
Payer: COMMERCIAL

## 2025-06-19 ENCOUNTER — TELEPHONE (OUTPATIENT)
Dept: AUDIOLOGY | Facility: CLINIC | Age: 85
End: 2025-06-19
Payer: COMMERCIAL

## 2025-06-19 NOTE — TELEPHONE ENCOUNTER
Spoke to patient.   Explained referral from [place] [facility] was to [facility] for possible cochlear implant evaluation - but she was incorrectly scheduled with ENT (which would be 2nd step after audiology evaluation).    Explained review of 4/3/25 audiogram and that our audiology clinic recommends retest of 'adult hearing evaluation' appointment, and if recommend per results, possible cochlear implant testing with audiology. Pt was not aware she was referred for CI testing & did not have info on what that entailed - writer gave general indications for when/why CI eval would be appropriate. Pt states she had her hearing aids cleaned after most recent audio and feels it has helped a lot and she is hearing well, even turning volume down. Still has issues with 'low tone' voices. Writer offered to schedule HEV but pt declined at this time and confirmed cancel of scheduled appts.  Writer recommended scheduling adult hearing eval with [facility] if she feels hearing has decreased or possibly per calendar year. She was appreciative of info. [facility] scheduling # & appt info given if she'd like to make appt.    Nan, Cochlear Implant Coordinator  [facility]   6/19/25